# Patient Record
Sex: FEMALE | Race: WHITE | NOT HISPANIC OR LATINO | Employment: UNEMPLOYED | URBAN - METROPOLITAN AREA
[De-identification: names, ages, dates, MRNs, and addresses within clinical notes are randomized per-mention and may not be internally consistent; named-entity substitution may affect disease eponyms.]

---

## 2024-04-04 ENCOUNTER — TRANSFERRED RECORDS (OUTPATIENT)
Dept: HEALTH INFORMATION MANAGEMENT | Facility: CLINIC | Age: 49
End: 2024-04-04

## 2024-08-01 ENCOUNTER — PATIENT OUTREACH (OUTPATIENT)
Dept: ONCOLOGY | Facility: CLINIC | Age: 49
End: 2024-08-01
Payer: COMMERCIAL

## 2024-08-01 ENCOUNTER — TRANSCRIBE ORDERS (OUTPATIENT)
Dept: OTHER | Age: 49
End: 2024-08-01

## 2024-08-01 DIAGNOSIS — C78.00 RECTAL CANCER METASTASIZED TO LUNG (H): Primary | ICD-10-CM

## 2024-08-01 DIAGNOSIS — C20 RECTAL CANCER METASTASIZED TO LUNG (H): Primary | ICD-10-CM

## 2024-08-15 NOTE — PROGRESS NOTES
DTC referral received from Dr Hernandez,  Onc, for patient with metastatic rectal cancer.      (See my bookmarks for pertinent records in Epic)      Hx 2/2023 rectal cancer via colonoscopy at , s/p palliative radiation, 4 lines of systemic tx, 1 clinic trial. 4/2024 lung mets on biopsy. 7/2024 CT with progressive disease; Dr Hernandez/ Onc referring to DTC for evaluation/screening for clinica trials.    Will offer next available DTC per pt preference.    Anderson Das RN  Oncology Nurse Navigator  [facility]  [phone]

## 2024-09-04 NOTE — PROGRESS NOTES
Consult Note  [facility]  Video Visit      This visit is being conducted as a video visit due to patient preference and/or transportation or other logistic barriers.  Patient location: Home  Provider location: Clinic  Video start time: 08:46a  Video end time: 09:12a  Total video time: 26 minutes        Primary Oncologist:  Kiki Hernandez MD  [facility]  [address] [place] [address]      Patient: Roula Darling  : 1975  Language: English   Pronouns: she/her/hers     Date of Visit: Sep 6, 2024       Reason for visit: Rectal cancer. Discussion of phase I clinical trial options.       History of Present Illness:  Roula Darling is a 49 year old patient referred to the [facility] for discussion of phase I clinical trials for treatment of rectal cancer. History as follows:    Rectal Cancer History:  23: Colonoscopy biopsy pathology: Invasive adenocarcinoma.   23: Pelvic MRI: T3 N2 M0 CRM involved.    2/15/23-23: Palliative pelvic radiation with total dose 2500 cGy in 5 fractions.     23-23: First-line FOLFOX therapy.   -23: CAP CT: Partial response.   -23: Oxaliplatin reaction. Failed desensitization.   23-24: First-line therapy with FOLFIRI due to failed oxaliplatin desensitization.   -23: CAP CT: Stable disease.   -23: CAP CT: Stable disease.  -11/3/23: CAP CT: Stable disease.   -24: CAP CT: Progressive disease.     24-24: Second-line therapy with Lonsurf + bevacizumab.   -24: CAP CT: Progressive disease.     24-24: Third-line therapy with regorafenib.   -24: CAP CT: Progressive disease.     24-24: Fourth-line therapy on phase I clinical trial XTX-301 ([id number])--a tumor-activated IL-12 therapy.   -24: CAP CT: Progressive disease.     24-Present: Fifth-line therapy with FOLFOX (oxaliplatin per desensitization protocol  "sign significant dilution).       Genetic/Genomic/Molecular Testing History:  2/1/23: Tumor IHC:   -pMMR.       Subjective:  Roula Darling presents to the DTC today for a scheduled consultation, unaccompanied.   Roula reports that she continues to receive oxaliplatin per desensitization, and has continued to react. She has another cycle in 2 weeks, and then will get repeat imaging.  She reports feeling \"dumb\" on this drug.   But it has improved her cough.  She is a knitter, and takes walks in the park. Up and around >50% of the day.  She has constipation related to the anti-nausea drugs, resolved with senna.   Treatment-induced vomiting. She is able to maintain her appetite and weight.   She has cold sensitivity, but no peripheral neuropathy otherwise.       Medical History:  Past Medical History:   Diagnosis Date    Asthma     High school;  now resolved    Colorectal cancer (H) 2023         Surgical History:  Past Surgical History:   Procedure Laterality Date    IR CHEST PORT PLACEMENT > 5 YRS OF AGE  02/17/2023    URINARY SURGERY      as a child          Medications:   Current Outpatient Medications   Medication Sig Dispense Refill    omeprazole (PRILOSEC) 20 MG DR capsule Take 20 mg by mouth.      ondansetron (ZOFRAN) 8 MG tablet Take 8 mg by mouth.      prochlorperazine (COMPAZINE) 10 MG tablet Take 1 tablet by mouth every 6 hours as needed.       No current facility-administered medications for this visit.          Allergies:   Allergies   Allergen Reactions    Oxaliplatin Anaphylaxis    Red Dye #40 (Allura Red) Unknown          Social History:  Patient lives her  and teenage children.   Work status: Not employed. Activity: No activity limitations.   Advanced Directives: None. Desired Healthcare Power of :  Silas Darling.   Social History     Tobacco Use    Smoking status: Former     Types: Cigarettes    Smokeless tobacco: Never          Visual Exam:   Constitutional: healthy and alert. " No acute distress    HEENT: Grossly normal. Eyes clear, no erythema.    Cardiovascular: No pallor.    Respiratory: No cough, no labored breathing    Musculoskeletal: Muscles well-developed, no gross defects. No obvious edema.    Skin: No visible rashes nor lesions    Neurologic: Grossly normal.    Psych: Appropriate mood and affect       Laboratory Examination:  8/21/24 CBC: WBC 6.8, hemoglobin 10.0, platelets 190.   8/21/24 CMP: Creatinine 0.61. Electrolytes within normal limits. AST 81, ALT 89, alkaline phosphatase 109, total bilirubin 0.5, albumin 3.5.       Radiographic Examination:  7/30/24: Chest, abdomen, pelvic CT: RECIST-measurable disease in the left lung, liver, right adrenal gland.   LUNG: Redemonstration of numerous bilateral pulmonary nodules. Target lesion details are as follows:  1. Left lower lobe pulmonary nodule currently measuring 32.9 x 34.4 mm.  2. Left upper lobe pulmonary nodule currently measuring 18.0 mm x 16.4 mm .  Numerous other pulmonary nodules have also increased in size compared to prior examination. Calcified granuloma in the right lower lobe, reflecting sequelae of distant prior granulomatous disease. A 21.3 mm x 18.4 mm lesion within the left lower lobe. A new 7.0 x 9.4 mm focus of central cavitation.  LIVER:   1. Right hepatic lobe liver lesion measuring 23.5 x 25.0 mm.  2. Right hepatic lobe lesion measuring 22.8 x 17.2 mm.  SPLEEN:   Interval increase in splenic size compared to prior examination, currently measuring 10.6 x 5.5 cm in axial dimensions.   ADRENALS: 25.0 x 18.1 x 20.3 mm hypodense right adrenal lesion.       Performance Status:  ECOG Grade 1.      Assessment:  Roula Darling is a 49 year old patient with a diagnosis of progressive rectal cancer, currently receiving fifth-line therapy.         Plan:   1) Rectal cancer: I reviewed and confirmed the history above with the patient. I defer discussion of SOC options to the primary oncologist,and did review potential  phase I clinical trial options.     Potential Phase I clinical trial options:  -Phase I VSV-GP clinical trial ([id number]; [id number]): This is a phase I dose-escalation trial of the oncolytic virus VSV-GP BI [id number] as monotherapy (part 1) or in combination with checkpoint inhibitor Ezabenlimab (anti-PD-1; part 2). VSV-GP is thought to exert antitumor effect both directly through tumor lysis, and indirectly through activation of anti-tumor immunity. VSV-GP is administered intratumorally, intravenously, or both depending on the arm of the study. VSV-GP is administered every 3 weeks (with an additional day 4 cycle 1) for a total of 4 cycles; for part 2 ezabenlimab will be administered each cycle for up to 12 months. This trial includes all solid tumors, and requires at least 1 accessible lesion for biopsy, with some arms requiring 2 accessible lesions (one for injection, one for biopsy). There is no limit to number of prior lines of therapy.   -TSCAN-002 ([id number]; [id number]): This is a basket trial evaluating safety and preliminary efficacy autologous enhanced TCR-Ts as single or multiple dose regimens, or in combination (T-Plex) following lymphodepleting chemotherapy. Prescreening for HLA type and ANA PAULA status is required for eligibility and triage to a study cohort; additional pre-screening tests are required for some cohorts (e.g. TSCAN-003 requires MAGE-A1 and/or HPV-16 E7 expression). Study includes all solid tumors. No limit to prior number of therapies.   --Not eligible for phase I Fusion FPX-01-01 (history of pelvic radiation therapy)    Adverse events pre-dating study therapy:  -Grade 1 peripheral neuropathy (attributable to previous treatment): Described as cold sensitivity, no chronic symptoms.  -Grade 1 nausea, vomiting (attributable to current therapy): Resolved with antiemetics. Able to maintain nutrition, weight.   -Grade 1 constipation (attributable to antiemetics): Resolved with senna prn.      2) Labs/tests ordered: None.       A total of 26 minutes was spent with the patient, 20 minutes of which were spent in counseling/treatment planning; an additional 5 minutes was spent in chart review and documentation.       Fatimah Sutton MD, MS, FACOG, FACS  9/6/2024  9:14 AM

## 2024-09-06 ENCOUNTER — VIRTUAL VISIT (OUTPATIENT)
Dept: ONCOLOGY | Facility: CLINIC | Age: 49
End: 2024-09-06
Attending: OBSTETRICS & GYNECOLOGY
Payer: COMMERCIAL

## 2024-09-06 ENCOUNTER — PRE VISIT (OUTPATIENT)
Dept: ONCOLOGY | Facility: CLINIC | Age: 49
End: 2024-09-06
Payer: COMMERCIAL

## 2024-09-06 VITALS — WEIGHT: 142 LBS | BODY MASS INDEX: 24.24 KG/M2 | HEIGHT: 64 IN

## 2024-09-06 DIAGNOSIS — C78.00 RECTAL CANCER METASTASIZED TO LUNG (H): ICD-10-CM

## 2024-09-06 DIAGNOSIS — C20 RECTAL CANCER METASTASIZED TO LUNG (H): ICD-10-CM

## 2024-09-06 DIAGNOSIS — C20 RECTAL CANCER (H): Primary | ICD-10-CM

## 2024-09-06 PROCEDURE — 99204 OFFICE O/P NEW MOD 45 MIN: CPT | Mod: 95 | Performed by: OBSTETRICS & GYNECOLOGY

## 2024-09-06 RX ORDER — ONDANSETRON 8 MG/1
8 TABLET, FILM COATED ORAL
COMMUNITY
Start: 2024-08-05

## 2024-09-06 RX ORDER — PROCHLORPERAZINE MALEATE 10 MG
1 TABLET ORAL EVERY 6 HOURS PRN
COMMUNITY
Start: 2024-07-18

## 2024-09-06 ASSESSMENT — PAIN SCALES - GENERAL: PAINLEVEL: NO PAIN (0)

## 2024-09-06 NOTE — NURSING NOTE
Current patient location: [address] [place] [address] N  [facility] [address]    Is the patient currently in the state of [place]? YES    Visit mode:VIDEO    If the visit is dropped, the patient can be reconnected by: VIDEO VISIT: Text to cell phone:   Telephone Information:   Mobile [phone]       Will anyone else be joining the visit? NO  (If patient encounters technical issues they should call [phone]    How would you like to obtain your AVS? MyChart    Are changes needed to the allergy or medication list?  Drug allergies  and medications added from med reconcile.  Pt states she had a reaction to Morphine as a child but hasn't had since.    Are refills needed on medications prescribed by this physician? NO    Rooming Documentation:  Questionnaire(s) not done per department protocol      Reason for visit: Consult    Shanika Qureshi LPN

## 2024-09-06 NOTE — TELEPHONE ENCOUNTER
RECORDS STATUS - DEV THERAPEUTICS      APPOINTMENT DATE: 09/06/24  APPOINTMENT TIME: 8AM   DIAGNOSIS: Rectal cancer metastasized to lung (H) [C20, C78.00]     NOTES STATUS DETAILS   OFFICE NOTE from referring provider  (if different from Oncologist)      OFFICE NOTE from Primary Oncologist  (ALL notes pertaining to Diagnosis)    Please include   Clinic name and address  Clinic phone and fax   RN or Nurse coordinator's name and phone   Oncologist's email address for consulting provider  CE-HP Dr. Kiki Vick   MEDICATION LIST CE-HP    LABS     PATHOLOGY REPORTS ONLY  (Path slides are not needed unless asked by the clinical team)  04/04/24: [id number]  02/01/23: [id number]   ANYTHING RELATED TO DIAGNOSIS CE-HP Most recnt 09/04/24   GENONOMIC TESTING     Molecular, NGS studies (Recs are scanned in their system; common ones: Hina Mooney, [employer])     IMAGING (NEED IMAGES & REPORT)     CT SCANS Req 09/05 07/30/24-02/01/23: CT CAP   MRI Req 09/05 04/03/24: MR Abd  02/02/23: MR Pelvs

## 2024-09-06 NOTE — LETTER
2024      Roula Darling  [address]  [place] [address]      Dear Colleague,    Thank you for referring your patient, Roula Darling, to the [facility]. Please see a copy of my visit note below.    Consult Note  [facility]  Video Visit      This visit is being conducted as a video visit due to patient preference and/or transportation or other logistic barriers.  Patient location: Home  Provider location: Clinic  Video start time: 08:46a  Video end time: 09:12a  Total video time: 26 minutes        Primary Oncologist:  Kiki Hernandez MD  [facility]  [address] [place] [address]      Patient: Roula Darling  : 1975  Language: English   Pronouns: she/her/hers     Date of Visit: Sep 6, 2024       Reason for visit: Rectal cancer. Discussion of phase I clinical trial options.       History of Present Illness:  Roula Darling is a 49 year old patient referred to the [facility] for discussion of phase I clinical trials for treatment of rectal cancer. History as follows:    Rectal Cancer History:  23: Colonoscopy biopsy pathology: Invasive adenocarcinoma.   23: Pelvic MRI: T3 N2 M0 CRM involved.    2/15/23-23: Palliative pelvic radiation with total dose 2500 cGy in 5 fractions.     23-23: First-line FOLFOX therapy.   -23: CAP CT: Partial response.   -23: Oxaliplatin reaction. Failed desensitization.   23-24: First-line therapy with FOLFIRI due to failed oxaliplatin desensitization.   -23: CAP CT: Stable disease.   -23: CAP CT: Stable disease.  -11/3/23: CAP CT: Stable disease.   -24: CAP CT: Progressive disease.     24-24: Second-line therapy with Lonsurf + bevacizumab.   -24: CAP CT: Progressive disease.     24-24: Third-line therapy with regorafenib.   -24: CAP CT: Progressive disease.  "    6/4/24-7/30/24: Fourth-line therapy on phase I clinical trial XTX-301 ([id number])--a tumor-activated IL-12 therapy.   -7/30/24: CAP CT: Progressive disease.     8/5/24-Present: Fifth-line therapy with FOLFOX (oxaliplatin per desensitization protocol sign significant dilution).       Genetic/Genomic/Molecular Testing History:  2/1/23: Tumor IHC:   -pMMR.       Subjective:  Roula Darling presents to the DTC today for a scheduled consultation, unaccompanied.   Roula reports that she continues to receive oxaliplatin per desensitization, and has continued to react. She has another cycle in 2 weeks, and then will get repeat imaging.  She reports feeling \"dumb\" on this drug.   But it has improved her cough.  She is a knitter, and takes walks in the park. Up and around >50% of the day.  She has constipation related to the anti-nausea drugs, resolved with senna.   Treatment-induced vomiting. She is able to maintain her appetite and weight.   She has cold sensitivity, but no peripheral neuropathy otherwise.       Medical History:  Past Medical History:   Diagnosis Date     Asthma     High school;  now resolved     Colorectal cancer (H) 2023         Surgical History:  Past Surgical History:   Procedure Laterality Date     IR CHEST PORT PLACEMENT > 5 YRS OF AGE  02/17/2023     URINARY SURGERY      as a child          Medications:   Current Outpatient Medications   Medication Sig Dispense Refill     omeprazole (PRILOSEC) 20 MG DR capsule Take 20 mg by mouth.       ondansetron (ZOFRAN) 8 MG tablet Take 8 mg by mouth.       prochlorperazine (COMPAZINE) 10 MG tablet Take 1 tablet by mouth every 6 hours as needed.       No current facility-administered medications for this visit.          Allergies:   Allergies   Allergen Reactions     Oxaliplatin Anaphylaxis     Red Dye #40 (Allura Red) Unknown          Social History:  Patient lives her  and teenage children.   Work status: Not employed. Activity: No activity " limitations.   Advanced Directives: None. Desired Healthcare Power of :  Silas Darling.   Social History     Tobacco Use     Smoking status: Former     Types: Cigarettes     Smokeless tobacco: Never          Visual Exam:   Constitutional: healthy and alert. No acute distress    HEENT: Grossly normal. Eyes clear, no erythema.    Cardiovascular: No pallor.    Respiratory: No cough, no labored breathing    Musculoskeletal: Muscles well-developed, no gross defects. No obvious edema.    Skin: No visible rashes nor lesions    Neurologic: Grossly normal.    Psych: Appropriate mood and affect       Laboratory Examination:  8/21/24 CBC: WBC 6.8, hemoglobin 10.0, platelets 190.   8/21/24 CMP: Creatinine 0.61. Electrolytes within normal limits. AST 81, ALT 89, alkaline phosphatase 109, total bilirubin 0.5, albumin 3.5.       Radiographic Examination:  7/30/24: Chest, abdomen, pelvic CT: RECIST-measurable disease in the left lung, liver, right adrenal gland.   LUNG: Redemonstration of numerous bilateral pulmonary nodules. Target lesion details are as follows:  1. Left lower lobe pulmonary nodule currently measuring 32.9 x 34.4 mm.  2. Left upper lobe pulmonary nodule currently measuring 18.0 mm x 16.4 mm .  Numerous other pulmonary nodules have also increased in size compared to prior examination. Calcified granuloma in the right lower lobe, reflecting sequelae of distant prior granulomatous disease. A 21.3 mm x 18.4 mm lesion within the left lower lobe. A new 7.0 x 9.4 mm focus of central cavitation.  LIVER:   1. Right hepatic lobe liver lesion measuring 23.5 x 25.0 mm.  2. Right hepatic lobe lesion measuring 22.8 x 17.2 mm.  SPLEEN:   Interval increase in splenic size compared to prior examination, currently measuring 10.6 x 5.5 cm in axial dimensions.   ADRENALS: 25.0 x 18.1 x 20.3 mm hypodense right adrenal lesion.       Performance Status:  ECOG Grade 1.      Assessment:  Roula Darling is a 49 year old  patient with a diagnosis of progressive rectal cancer, currently receiving fifth-line therapy.         Plan:   1) Rectal cancer: I reviewed and confirmed the history above with the patient. I defer discussion of SOC options to the primary oncologist,and did review potential phase I clinical trial options.     Potential Phase I clinical trial options:  -Phase I VSV-GP clinical trial (2022IS180; [id number]): This is a phase I dose-escalation trial of the oncolytic virus VSV-GP BI [id number] as monotherapy (part 1) or in combination with checkpoint inhibitor Ezabenlimab (anti-PD-1; part 2). VSV-GP is thought to exert antitumor effect both directly through tumor lysis, and indirectly through activation of anti-tumor immunity. VSV-GP is administered intratumorally, intravenously, or both depending on the arm of the study. VSV-GP is administered every 3 weeks (with an additional day 4 cycle 1) for a total of 4 cycles; for part 2 ezabenlimab will be administered each cycle for up to 12 months. This trial includes all solid tumors, and requires at least 1 accessible lesion for biopsy, with some arms requiring 2 accessible lesions (one for injection, one for biopsy). There is no limit to number of prior lines of therapy.   -TSCAN-002 ([id number]; [id number]): This is a basket trial evaluating safety and preliminary efficacy autologous enhanced TCR-Ts as single or multiple dose regimens, or in combination (T-Plex) following lymphodepleting chemotherapy. Prescreening for HLA type and ANA PAULA status is required for eligibility and triage to a study cohort; additional pre-screening tests are required for some cohorts (e.g. TSCAN-003 requires MAGE-A1 and/or HPV-16 E7 expression). Study includes all solid tumors. No limit to prior number of therapies.   --Not eligible for phase I Fusion FPX-01-01 (history of pelvic radiation therapy)    Adverse events pre-dating study therapy:  -Grade 1 peripheral neuropathy (attributable to previous  treatment): Described as cold sensitivity, no chronic symptoms.  -Grade 1 nausea, vomiting (attributable to current therapy): Resolved with antiemetics. Able to maintain nutrition, weight.   -Grade 1 constipation (attributable to antiemetics): Resolved with senna prn.     2) Labs/tests ordered: None.       A total of 26 minutes was spent with the patient, 20 minutes of which were spent in counseling/treatment planning; an additional 5 minutes was spent in chart review and documentation.       Fatimah Sutton MD, MS, FACOG, FACS  9/6/2024  9:14 AM          Again, thank you for allowing me to participate in the care of your patient.        Sincerely,        Fatimah Sutton MD

## 2024-09-06 NOTE — Clinical Note
Brendan Celeste is an excellent clinical trial candidate whose rectal cancer has unfortunately progressed quickly through the last 3 treatments. She would be ready to enroll at any time. I identified VSV and TSCAN as potential options (others?). Please email her contact information and these 2 protocols. Could you also set-up a pre-screening appointment for TSCAN? Thanks.  --marla Sutton MD, MS, FACOG, FACS 9/6/2024 9:15 AM

## 2024-09-08 PROBLEM — C20 RECTAL CANCER (H): Status: ACTIVE | Noted: 2024-09-08

## 2024-10-06 ENCOUNTER — HEALTH MAINTENANCE LETTER (OUTPATIENT)
Age: 49
End: 2024-10-06

## 2025-02-17 NOTE — PROGRESS NOTES
[facility]    This visit is being conducted as a video visit due to patient preference and/or transportation or other logistic barriers.    Primary Oncologist:  Kiki Hernandez MD  [facility] [address] [place] [address]      Patient: Roula Darling  : 1975  Language: English   Pronouns: she/her/hers     Date of Visit: 2025       Reason for visit: Rectal cancer. Discussion of phase I clinical trial options.       History of Present Illness:  Roula Darling is a 49 year old female with KRAS G12V mutated rectal adenocarcinoma metastasized to the lungs, adrenal glands, and lymph nodes.    Rectal Cancer History:  23: Colonoscopy biopsy pathology: Invasive adenocarcinoma.   23: Pelvic MRI: T3 N2 M0 CRM involved.    2/15/23-23: Palliative pelvic radiation with total dose 2500 cGy in 5 fractions.     23-23: First-line FOLFOX therapy.   -23: CAP CT: Partial response.   -23: Oxaliplatin reaction. Failed desensitization.   23-24: First-line therapy with FOLFIRI due to failed oxaliplatin desensitization.   -23: CAP CT: Stable disease.   -23: CAP CT: Stable disease.  -11/3/23: CAP CT: Stable disease.   -24: CAP CT: Progressive disease.     24-24: Second-line therapy with Lonsurf + bevacizumab.   -24: CAP CT: Progressive disease.     24-24: Third-line therapy with regorafenib.   -24: CAP CT: Progressive disease.     24-24: Fourth-line therapy on phase I clinical trial XTX-301 ([id number])--a tumor-activated IL-12 therapy.   -24: CAP CT: Progressive disease.     24-Present: Fifth-line therapy with FOLFOX (oxaliplatin per desensitization protocol sign significant dilution).   - 2025 CAP CT:  Progressive disease      Genetic/Genomic/Molecular Testing History:  23: Tumor IHC:   -pMMR.     Subjective:  Presents today after determined to have  disease progression on 5th line therapy with FOLFOX.  At last DTC visit with Fatimah Sutton MD on 9/6/2024, VSV-GP and TSCAN trials were discussed.  Ms. Eid states that her last FOLFOX treatment was 2 weeks ago.  She notes that she was receiving FOLFOX at 50% dose as greater doses caused hypersensitivity reaction.  She notes that with her disease progression she has redeveloped a dry cough.  She is treating this with Tessalon Perles.  She has had no fevers or chills.  She has had no episodes of chest pain.  In fact, she denies pain of any kind.  She specifically denies abdominal pain.  She notes having constipation related to the antiemetics that she receives for the FOLFOX treatment.  Her health is otherwise unchanged from her last visit.    Medical History:  Past Medical History:   Diagnosis Date    Asthma     High school;  now resolved    Colorectal cancer (H) 2023         Surgical History:  Past Surgical History:   Procedure Laterality Date    IR CHEST PORT PLACEMENT > 5 YRS OF AGE  02/17/2023    URINARY SURGERY      as a child     Medications:   Current Outpatient Medications   Medication Sig Dispense Refill    omeprazole (PRILOSEC) 20 MG DR capsule Take 20 mg by mouth.      ondansetron (ZOFRAN) 8 MG tablet Take 8 mg by mouth.      prochlorperazine (COMPAZINE) 10 MG tablet Take 1 tablet by mouth every 6 hours as needed.       No current facility-administered medications for this visit.      Allergies:   Allergies   Allergen Reactions    Oxaliplatin Anaphylaxis    Red Dye #40 (Allura Red) Unknown      Social History:  Patient lives her  and teenage children.   Work status: Not employed. Activity: No activity limitations.   Advanced Directives: None. Desired Healthcare Power of :  Silas Darling.     Physical Exam:   /80   Pulse 115   Resp 20   Wt 62.9 kg (138 lb 11.2 oz)   SpO2 100%   BMI 23.81 kg/m    Constitutional: healthy and alert. No acute distress  HEENT: Grossly normal.  Eyes clear, no erythema.  Cardiovascular: No pallor.  Respiratory: No cough, no labored breathing  Musculoskeletal: Muscles well-developed, no gross defects. No obvious edema.  Skin: No visible rashes nor lesions  Neurologic: Grossly normal.  Psych: Appropriate mood and affect       Laboratory Examination:  I personally reviewed the below labs while in clinic today:     2/17/2025 Labs:  Electrolytes are wnl with the exception of low carbon dioxide of 19 mmol/L  Creatinine is low at 0.53 mg/dL  Alkaline phosphatase is elevated at 192 U/L  Transaminases are wnl.    WBC is wnl at 7.2  Hemoglobin is low at 10.4 g/dL  Platelets are wnl.      Radiographic Examination:  I personally reviewed the below images while in clinic today:    2/9/2025 CT C/A/P w/ contrast:  FINDINGS:     CHEST:   CHEST WALL AND LOWER NECK: Port catheter tip in satisfactory position. Chest wall otherwise unremarkable     HEART AND VASCULATURE: Normal heart size. No pericardial effusion. No thoracic aortic aneurysm.     MEDIASTINUM: Unremarkable esophagus. No adenopathy.     LUNGS AND PLEURAL SPACE: Multiple lung masses are redemonstrated. They have increased in size compared to prior. Representative examples include a mass in the left upper lobe on image #32 now measures 2.2 x 2.4 cm. This compares with 1.7 x 1.8 cm previously on the study of 12/06/2024. The left hilar mass has a maximum diameter of 3.4 cm. This compares with 2.3 cm on the prior exam. A right lung mass on image #38 has a maximum diameter of 1.3 cm. This compares with 1.0 cm previously     ABDOMEN/PELVIS:   LIVER: Ill-defined mass in the inferior right hepatic lobe now measures 1.9 cm. Previously measured 1.6 cm. Larger mass in the mid right liver now measures approximately 3.7 x 3.4 cm. A recent measured 2.3 x 2.6 cm.  New small 0.9 cm right liver mass on image number site 53     GALLBLADDER AND BILIARY TREE: Unremarkable. No intrahepatic or extrahepatic biliary ductal dilation.      PANCREAS: Unremarkable.     SPLEEN: Unremarkable.     ADRENALS: Multinodular right adrenal gland appears stable. Left adrenal gland unremarkable     KIDNEYS, URETERS, AND BLADDER: Unremarkable. No hydronephrosis.     VESSELS: No abdominal aortic aneurysm.     BOWEL: No evidence of bowel obstruction. Soft tissue thickening about the rectum appears similar.     REPRODUCTIVE ORGANS: No pelvic mass.     MESENTERY/PERITONEUM: No enlarged mesenteric lymph nodes. No ascites or free air. No focal fluid collection.     RETROPERITONEUM: No adenopathy.     ABDOMINAL WALL/SOFT TISSUES: Unremarkable.     BONES: Stable compared to previous.       Performance Status:  ECOG Grade 1.      Assessment:  Roula Darling is a 49 year old patient with a diagnosis of metastatic rectal cancer who recently progressed on 5th line therapy with FOLFOX.     Plan:   Rectal cancer: I reviewed and confirmed the history above with the patient. I defer discussion of SOC options to the primary oncologist,and did review potential phase I clinical trial options.      She had previous discussions regarding the VSV-GP and TSCAN trials.  Today, we discussed screening for the phase I TORL-1-23 trial.  TORL-1-23 directs chemotherapy (MMAE, a potent microtubule inhibitor) to cancer cells expressing claudin-6. The trial has cohorts for tumors with high expression of Claudin-6 as well as a cohort for low or negative expression.  For this trial we require prescreening to send archival tissue to see if the cancer has high expression of Claudin-6.  The treatment is administered intravenously once every 3 weeks.  Potential side effects of TORL-1-23 include risk of hypersensitivity reaction, low blood counts, peripheral neuropathy, elevation of liver tests, fatigue, edema, and nausea amongst others. Patients are imaged once every 6 weeks to evaluate response to treatment.  Treatment is continued until disease progression, unacceptable toxicity, or the patient  chooses to stop treatment for whatever reason.  She is interested in screening for this trial and following our visit consented to have tissue sent for claudin-6 testing.  This testing typically takes 4-6 weeks to results.  In the meantime, we encouraged her to continue treatment with Dr. Hernandez.      I spent 31 minutes on the date of the encounter doing chart review, review of test results, interpretation of tests, patient visit, and documentation

## 2025-02-19 ENCOUNTER — OFFICE VISIT (OUTPATIENT)
Dept: ONCOLOGY | Facility: CLINIC | Age: 50
End: 2025-02-19
Attending: INTERNAL MEDICINE
Payer: COMMERCIAL

## 2025-02-19 ENCOUNTER — ALLIED HEALTH/NURSE VISIT (OUTPATIENT)
Dept: ONCOLOGY | Facility: CLINIC | Age: 50
End: 2025-02-19
Payer: COMMERCIAL

## 2025-02-19 VITALS
DIASTOLIC BLOOD PRESSURE: 80 MMHG | RESPIRATION RATE: 20 BRPM | WEIGHT: 138.7 LBS | BODY MASS INDEX: 23.81 KG/M2 | OXYGEN SATURATION: 100 % | HEART RATE: 115 BPM | SYSTOLIC BLOOD PRESSURE: 114 MMHG

## 2025-02-19 DIAGNOSIS — C20 RECTAL CANCER METASTASIZED TO LUNG (H): Primary | ICD-10-CM

## 2025-02-19 DIAGNOSIS — C78.00 RECTAL CANCER METASTASIZED TO LUNG (H): Primary | ICD-10-CM

## 2025-02-19 DIAGNOSIS — Z00.6 EVALUATED FOR CLINICAL TRIAL ENROLLMENT: ICD-10-CM

## 2025-02-19 PROCEDURE — 99213 OFFICE O/P EST LOW 20 MIN: CPT | Performed by: INTERNAL MEDICINE

## 2025-02-19 RX ORDER — LIDOCAINE AND PRILOCAINE 25; 25 MG/G; MG/G
CREAM TOPICAL
COMMUNITY
Start: 2024-03-15

## 2025-02-19 RX ORDER — SENNOSIDES A AND B 8.6 MG/1
2 TABLET, FILM COATED ORAL
COMMUNITY
Start: 2024-06-07

## 2025-02-19 RX ORDER — BENZONATATE 100 MG/1
100 CAPSULE ORAL
COMMUNITY
Start: 2025-02-17

## 2025-02-19 RX ORDER — POLYETHYLENE GLYCOL 3350 17 G/17G
17 POWDER, FOR SOLUTION ORAL DAILY PRN
COMMUNITY
Start: 2024-06-28

## 2025-02-19 ASSESSMENT — PAIN SCALES - GENERAL: PAINLEVEL_OUTOF10: NO PAIN (0)

## 2025-02-19 NOTE — LETTER
2025      Roula Darling  [address]  [place] [address]      Dear Colleague,    Thank you for referring your patient, Roula Darling, to the [facility]. Please see a copy of my visit note below.      [facility]    This visit is being conducted as a video visit due to patient preference and/or transportation or other logistic barriers.    Primary Oncologist:  Kiki Hernandez MD  [facility] [address] [place]  [place] [address]      Patient: Roula Darling  : 1975  Language: English   Pronouns: she/her/hers     Date of Visit: 2025       Reason for visit: Rectal cancer. Discussion of phase I clinical trial options.       History of Present Illness:  Roula Darling is a 49 year old female with KRAS G12V mutated rectal adenocarcinoma metastasized to the lungs, adrenal glands, and lymph nodes.    Rectal Cancer History:  23: Colonoscopy biopsy pathology: Invasive adenocarcinoma.   23: Pelvic MRI: T3 N2 M0 CRM involved.    2/15/23-23: Palliative pelvic radiation with total dose 2500 cGy in 5 fractions.     23-23: First-line FOLFOX therapy.   -23: CAP CT: Partial response.   -23: Oxaliplatin reaction. Failed desensitization.   23-24: First-line therapy with FOLFIRI due to failed oxaliplatin desensitization.   -23: CAP CT: Stable disease.   -23: CAP CT: Stable disease.  -11/3/23: CAP CT: Stable disease.   -24: CAP CT: Progressive disease.     24-24: Second-line therapy with Lonsurf + bevacizumab.   -24: CAP CT: Progressive disease.     24-24: Third-line therapy with regorafenib.   -24: CAP CT: Progressive disease.     24-24: Fourth-line therapy on phase I clinical trial XTX-301 ([id number])--a tumor-activated IL-12 therapy.   -24: CAP CT: Progressive disease.     24-Present: Fifth-line therapy with FOLFOX  (oxaliplatin per desensitization protocol sign significant dilution).   - 2/9/2025 CAP CT:  Progressive disease      Genetic/Genomic/Molecular Testing History:  2/1/23: Tumor IHC:   -pMMR.     Subjective:  Presents today after determined to have disease progression on 5th line therapy with FOLFOX.  At last DTC visit with Fatimah Sutton MD on 9/6/2024, VSV-GP and TSCAN trials were discussed.  Ms. Eid states that her last FOLFOX treatment was 2 weeks ago.  She notes that she was receiving FOLFOX at 50% dose as greater doses caused hypersensitivity reaction.  She notes that with her disease progression she has redeveloped a dry cough.  She is treating this with Tessalon Perles.  She has had no fevers or chills.  She has had no episodes of chest pain.  In fact, she denies pain of any kind.  She specifically denies abdominal pain.  She notes having constipation related to the antiemetics that she receives for the FOLFOX treatment.  Her health is otherwise unchanged from her last visit.    Medical History:  Past Medical History:   Diagnosis Date     Asthma     High school;  now resolved     Colorectal cancer (H) 2023         Surgical History:  Past Surgical History:   Procedure Laterality Date     IR CHEST PORT PLACEMENT > 5 YRS OF AGE  02/17/2023     URINARY SURGERY      as a child     Medications:   Current Outpatient Medications   Medication Sig Dispense Refill     omeprazole (PRILOSEC) 20 MG DR capsule Take 20 mg by mouth.       ondansetron (ZOFRAN) 8 MG tablet Take 8 mg by mouth.       prochlorperazine (COMPAZINE) 10 MG tablet Take 1 tablet by mouth every 6 hours as needed.       No current facility-administered medications for this visit.      Allergies:   Allergies   Allergen Reactions     Oxaliplatin Anaphylaxis     Red Dye #40 (Allura Red) Unknown      Social History:  Patient lives her  and teenage children.   Work status: Not employed. Activity: No activity limitations.   Advanced Directives: None.  Desired Healthcare Power of :  Silas Darling.     Physical Exam:   /80   Pulse 115   Resp 20   Wt 62.9 kg (138 lb 11.2 oz)   SpO2 100%   BMI 23.81 kg/m    Constitutional: healthy and alert. No acute distress  HEENT: Grossly normal. Eyes clear, no erythema.  Cardiovascular: No pallor.  Respiratory: No cough, no labored breathing  Musculoskeletal: Muscles well-developed, no gross defects. No obvious edema.  Skin: No visible rashes nor lesions  Neurologic: Grossly normal.  Psych: Appropriate mood and affect       Laboratory Examination:  I personally reviewed the below labs while in clinic today:     2/17/2025 Labs:  Electrolytes are wnl with the exception of low carbon dioxide of 19 mmol/L  Creatinine is low at 0.53 mg/dL  Alkaline phosphatase is elevated at 192 U/L  Transaminases are wnl.    WBC is wnl at 7.2  Hemoglobin is low at 10.4 g/dL  Platelets are wnl.      Radiographic Examination:  I personally reviewed the below images while in clinic today:    2/9/2025 CT C/A/P w/ contrast:  FINDINGS:     CHEST:   CHEST WALL AND LOWER NECK: Port catheter tip in satisfactory position. Chest wall otherwise unremarkable     HEART AND VASCULATURE: Normal heart size. No pericardial effusion. No thoracic aortic aneurysm.     MEDIASTINUM: Unremarkable esophagus. No adenopathy.     LUNGS AND PLEURAL SPACE: Multiple lung masses are redemonstrated. They have increased in size compared to prior. Representative examples include a mass in the left upper lobe on image #32 now measures 2.2 x 2.4 cm. This compares with 1.7 x 1.8 cm previously on the study of 12/06/2024. The left hilar mass has a maximum diameter of 3.4 cm. This compares with 2.3 cm on the prior exam. A right lung mass on image #38 has a maximum diameter of 1.3 cm. This compares with 1.0 cm previously     ABDOMEN/PELVIS:   LIVER: Ill-defined mass in the inferior right hepatic lobe now measures 1.9 cm. Previously measured 1.6 cm. Larger mass in  the mid right liver now measures approximately 3.7 x 3.4 cm. A recent measured 2.3 x 2.6 cm.  New small 0.9 cm right liver mass on image number site 53     GALLBLADDER AND BILIARY TREE: Unremarkable. No intrahepatic or extrahepatic biliary ductal dilation.     PANCREAS: Unremarkable.     SPLEEN: Unremarkable.     ADRENALS: Multinodular right adrenal gland appears stable. Left adrenal gland unremarkable     KIDNEYS, URETERS, AND BLADDER: Unremarkable. No hydronephrosis.     VESSELS: No abdominal aortic aneurysm.     BOWEL: No evidence of bowel obstruction. Soft tissue thickening about the rectum appears similar.     REPRODUCTIVE ORGANS: No pelvic mass.     MESENTERY/PERITONEUM: No enlarged mesenteric lymph nodes. No ascites or free air. No focal fluid collection.     RETROPERITONEUM: No adenopathy.     ABDOMINAL WALL/SOFT TISSUES: Unremarkable.     BONES: Stable compared to previous.       Performance Status:  ECOG Grade 1.      Assessment:  Roula Darling is a 49 year old patient with a diagnosis of metastatic rectal cancer who recently progressed on 5th line therapy with FOLFOX.     Plan:   Rectal cancer: I reviewed and confirmed the history above with the patient. I defer discussion of SOC options to the primary oncologist,and did review potential phase I clinical trial options.      She had previous discussions regarding the VSV-GP and TSCAN trials.  Today, we discussed screening for the phase I TORL-1-23 trial.  TORL-1-23 directs chemotherapy (MMAE, a potent microtubule inhibitor) to cancer cells expressing claudin-6. The trial has cohorts for tumors with high expression of Claudin-6 as well as a cohort for low or negative expression.  For this trial we require prescreening to send archival tissue to see if the cancer has high expression of Claudin-6.  The treatment is administered intravenously once every 3 weeks.  Potential side effects of TORL-1-23 include risk of hypersensitivity reaction, low blood counts,  peripheral neuropathy, elevation of liver tests, fatigue, edema, and nausea amongst others. Patients are imaged once every 6 weeks to evaluate response to treatment.  Treatment is continued until disease progression, unacceptable toxicity, or the patient chooses to stop treatment for whatever reason.  She is interested in screening for this trial and following our visit consented to have tissue sent for claudin-6 testing.  This testing typically takes 4-6 weeks to results.  In the meantime, we encouraged her to continue treatment with Dr. Hernandez.      I spent 31 minutes on the date of the encounter doing chart review, review of test results, interpretation of tests, patient visit, and documentation           Again, thank you for allowing me to participate in the care of your patient.        Sincerely,        Lupis Roland MD    Electronically signed

## 2025-02-19 NOTE — PROGRESS NOTES
[id number] Pre screening : Informed Consent Note     The consent form, including purpose, risks and benefits, was reviewed with Roula Darling, and all questions were answered before she signed the consent form. The patient understands that the study involves an active treatment phase as well as a post-treatment follow up phase.     Present during the discussion was Dr. Roland, patient, and myself A copy of the signed form was provided to the patient. No procedures specific to this study were performed prior to the patient signing the consent form.    Consent Version Date: 11/21/2023  Consent obtained by: Katharina Duran RN    Date: 02/19/2025  HIPAA authorization signed?: yes  HIPAA authorization version date: 04/08/2022  RACE AND ETHNICITY:  I discussed with Roula Darling that the National [employer] [place] (NCI) has asked that information on race and ethnicity be obtained from NCI-sponsored studies' participants whenever possible and that the purpose for this request is to assist the NCI in evaluating whether persons of all races and ethnicity are given the opportunity to participate in new cancer treatment options. It was explained that the information will be sent to the NCI in a way in which she cannot be identified. It was also explained that providing this information is voluntary.  Roula Darling provided the following responses:  Ethnicity: non-  Race: White  Katharina Duran RN

## 2025-03-10 ENCOUNTER — ALLIED HEALTH/NURSE VISIT (OUTPATIENT)
Dept: ONCOLOGY | Facility: CLINIC | Age: 50
End: 2025-03-10
Payer: COMMERCIAL

## 2025-03-10 DIAGNOSIS — C78.00 RECTAL CANCER METASTASIZED TO LUNG (H): Primary | ICD-10-CM

## 2025-03-10 DIAGNOSIS — C20 RECTAL CANCER METASTASIZED TO LUNG (H): Primary | ICD-10-CM

## 2025-03-11 NOTE — PROGRESS NOTES
[id number] TORL-1-23     Writer received study results for Claudin-6 testing for this patient. Her tumor tissue is  negative  for CLDN6 expression. Writer sent this information to the patient via [employer] and also notified her DTC physician, Dr. Deleon.

## 2025-06-03 ENCOUNTER — ALLIED HEALTH/NURSE VISIT (OUTPATIENT)
Dept: ONCOLOGY | Facility: CLINIC | Age: 50
End: 2025-06-03
Payer: COMMERCIAL

## 2025-06-03 DIAGNOSIS — Z00.6 CLINICAL TRIAL PARTICIPANT: Primary | ICD-10-CM

## 2025-06-03 NOTE — NURSING NOTE
Patient identified as TORL candidate -- Reached out to Dr. Kennedy, per MD this is a good time to start trial and patient is interested and willing  Currently getting Fruquitinib which will require 28 day washout

## 2025-06-09 NOTE — PROGRESS NOTES
DEVELOPMENTAL THERAPEUTICS CLINIC      PATIENT NAME: Roula Darling MRN # [id number]  DATE OF VISIT: June 9, 2025 YOB: 1975    Study: TORL1,23  Date of Consent: 6/10/25  C1D1: Tentatively 6/30/25    Cancer Type: Rectal Cancer  Primary Oncologist: Kiki Hernandez    Reason for Visit: follow-up      Interval History  Roula is here in follow-up.  She is here for a consenting visit for TORL 1,23 (Anti-Claudin6 adc).  She is feeling ok.  She notes significant fatigue all the time.  She is really not able to be very active as a result.  She is still doing activities - her daughter bought her a puppy (Border Collie) that is very high energy so she is the one taking the dog for walks daily - in and out of the house.  She is doing some light chores, but admits not very much.  She mostly is knitting and reading. She takes naps but only 10minutes at a time.  She is not having shortness of breath really, just gets tired easily.  She has some nausea with emesis at times.  She says smells really bother her so she is trying to keep the windows open more.  She otherwise is not really having any abdominal pain, no diarrhea or constipation.  She does not have any neuropathy from prior treatment, just some cold sensitivity from the oxaliplatin but that has resolved now.    Otherwise she is not really complaining of any pain today.         Cancer History  (2/15/2023 - 2/21/2023) Palliative radiation, 2500 cGy to pelvis in 5 fractions.  (2/22/2023 - 05/01/2023.) FOLFOX chemotherapy every 2 weeks.  05/01/2023: Oxaliplatin reaction needing EpiPen. Oxaliplatin desensitization failed 05/08/2023. Hence patient received 5 FU only that treatment  05/22/2023- 1/16/24: FOLFIRI with growth factor support every 2 weeks. Progressive disease in the lungs  2/6/24-4/2/24: Lonsurf with bevacizumab  04/20/2024-5/20/24: Regorafenib  6/4/24- 07/30/2024. : clinical trial XTX-301  08/05/2024-02/05/2025: Oxaliplatin desensitization plan  for FOLFOX   02/24/2025- 03/10/2025:5 FU alone  04/01/2025:Fruquintinib    Molecular/NGS  FoundationOne completed 03/13/2023. K-jerilyn G12V mutated.    Other mutations:  KRAS G12 V  NRAS wild type  [id number]  FBXW7  PTEN  SMAD4  Tp53     Microsatellite stable  TMB: 4 mut/MB     BRAF negative     Others: Her2 IHC1+    Pre-screening for TORL1, 23 - Claudin 6 negative (Reported on 3/19/25)    Past Medical History  Past Medical History:   Diagnosis Date    Asthma     High school;  now resolved    Colorectal cancer (H) 2023       Current Medications  Current Outpatient Medications   Medication Sig Dispense Refill    benzonatate (TESSALON) 100 MG capsule Take 100 mg by mouth.      lidocaine-prilocaine (EMLA) 2.5-2.5 % external cream APPLY TO PORT-A-CATH SITE ONE HOUR PRIOR TO NEEDLE ACCESS.      omeprazole (PRILOSEC) 20 MG DR capsule Take 20 mg by mouth.      ondansetron (ZOFRAN) 8 MG tablet Take 8 mg by mouth.      polyethylene glycol (MIRALAX) 17 g packet Take 17 g by mouth daily as needed.      prochlorperazine (COMPAZINE) 10 MG tablet Take 1 tablet by mouth every 6 hours as needed.      senna (SENOKOT) 8.6 MG tablet Take 2 tablets by mouth.          Review of Systems: As above in the HPI, o/w complete 12-point ROS was negative.    Physical Exam  /75 (BP Location: Right arm, Patient Position: Sitting, Cuff Size: Adult Regular)   Pulse 113   Temp 97.6  F (36.4  C) (Oral)   Resp 12   Wt 56.1 kg (123 lb 11.2 oz)   SpO2 99%   BMI 21.23 kg/m       GEN: NAD  HEENT: EOMI, no icterus, injection or pallor. Oropharynx clear  NECK: no cervical or supraclavicular lymphadenopathy  LUNGS: clear bilaterally  CV: regular, no murmurs, rubs, or gallops  ABDOMEN: soft, non-tender, non-distended, normal bowel sounds  EXT: warm, well perfused, no edema  NEURO: alert  SKIN: no rashes    Laboratory and Imaging Studies    Mostly unremarkable.  Alk phos slightly elevated.   LDH was quite elevated at 600.   Otherwise CBC is ok. Hgb  11.5    Labs were independently reviewed and interpreted by me    No image results found.       Imaging was personally reviewed and interpreted by me      Assessment/Plan  Ms. Roula Darling is a 50 year old female with metastatic rectal cancer who returns to clinic for consideration of TORL1,23 on Claudin 6 negative arm. .    We discussed the rationale of the trial.   We discussed that while her Claudin 6 was negative there is precedent for ADCs working in the absence of the target.  We discussed some of the side effects of TORL1,23 - myelosuppression, diarrhea, neuropathy, eye toxicity and lung toxicity.  We also discussed the study requirements with regards to PK blood draws and schedule of being here.   We discussed alternatives - she is currently not on any treatment as she either did not tolerate treatment or really does not have standard of care options remaining.     She expressed interest in proceedign with the study and she did go through the consent process with Katherin Florez [facility] (see separate consenting note).     ECOG PS: 1    Last scan was in March - will repeat scans for screening.   Screening blood work to be completed today.     Tentatively discussed c1d1 date of 6/30/25.  Needs to be confirmed.           40 minutes spent on the date of the encounter doing chart review, review of outside records, review of test results, interpretation of tests, patient visit, and documentation     Grant Lora  Associate Professor of Medicine  Division of Hematology, Oncology, and Transplantation

## 2025-06-10 ENCOUNTER — OFFICE VISIT (OUTPATIENT)
Dept: ONCOLOGY | Facility: CLINIC | Age: 50
End: 2025-06-10
Attending: INTERNAL MEDICINE
Payer: COMMERCIAL

## 2025-06-10 ENCOUNTER — LAB (OUTPATIENT)
Dept: LAB | Facility: CLINIC | Age: 50
End: 2025-06-10
Attending: INTERNAL MEDICINE
Payer: COMMERCIAL

## 2025-06-10 ENCOUNTER — ALLIED HEALTH/NURSE VISIT (OUTPATIENT)
Dept: ONCOLOGY | Facility: CLINIC | Age: 50
End: 2025-06-10
Payer: COMMERCIAL

## 2025-06-10 VITALS
BODY MASS INDEX: 21.23 KG/M2 | WEIGHT: 123.7 LBS | TEMPERATURE: 97.6 F | OXYGEN SATURATION: 99 % | HEART RATE: 113 BPM | SYSTOLIC BLOOD PRESSURE: 105 MMHG | RESPIRATION RATE: 12 BRPM | DIASTOLIC BLOOD PRESSURE: 75 MMHG

## 2025-06-10 DIAGNOSIS — Z00.6 EXAMINATION OF PARTICIPANT IN CLINICAL TRIAL: ICD-10-CM

## 2025-06-10 DIAGNOSIS — Z00.6 EXAMINATION OF PARTICIPANT IN CLINICAL TRIAL: Primary | ICD-10-CM

## 2025-06-10 LAB
ALBUMIN SERPL BCG-MCNC: 4.3 G/DL (ref 3.5–5.2)
ALBUMIN UR-MCNC: NEGATIVE MG/DL
ALP SERPL-CCNC: 168 U/L (ref 40–150)
ALT SERPL W P-5'-P-CCNC: 12 U/L (ref 0–50)
AMYLASE SERPL-CCNC: 30 U/L (ref 28–100)
ANION GAP SERPL CALCULATED.3IONS-SCNC: 14 MMOL/L (ref 7–15)
APPEARANCE UR: CLEAR
AST SERPL W P-5'-P-CCNC: 30 U/L (ref 0–45)
BASOPHILS # BLD AUTO: 0 10E3/UL (ref 0–0.2)
BASOPHILS NFR BLD AUTO: 0 %
BILIRUB SERPL-MCNC: 0.4 MG/DL
BILIRUB UR QL STRIP: NEGATIVE
BUN SERPL-MCNC: 7.6 MG/DL (ref 6–20)
CALCIUM SERPL-MCNC: 9.8 MG/DL (ref 8.8–10.4)
CEA SERPL-MCNC: 6.7 NG/ML
CHLORIDE SERPL-SCNC: 101 MMOL/L (ref 98–107)
COLOR UR AUTO: ABNORMAL
CREAT SERPL-MCNC: 0.56 MG/DL (ref 0.51–0.95)
EGFRCR SERPLBLD CKD-EPI 2021: >90 ML/MIN/1.73M2
EOSINOPHIL # BLD AUTO: 0.4 10E3/UL (ref 0–0.7)
EOSINOPHIL NFR BLD AUTO: 5 %
ERYTHROCYTE [DISTWIDTH] IN BLOOD BY AUTOMATED COUNT: 18.3 % (ref 10–15)
GLUCOSE SERPL-MCNC: 92 MG/DL (ref 70–99)
GLUCOSE UR STRIP-MCNC: NEGATIVE MG/DL
HCG SERPL QL: NEGATIVE
HCO3 SERPL-SCNC: 23 MMOL/L (ref 22–29)
HCT VFR BLD AUTO: 36.6 % (ref 35–47)
HGB BLD-MCNC: 11.5 G/DL (ref 11.7–15.7)
HGB UR QL STRIP: NEGATIVE
IMM GRANULOCYTES # BLD: 0 10E3/UL
IMM GRANULOCYTES NFR BLD: 0 %
INR PPP: 1.05 (ref 0.85–1.15)
KETONES UR STRIP-MCNC: 20 MG/DL
LDH SERPL L TO P-CCNC: 600 U/L (ref 0–250)
LEUKOCYTE ESTERASE UR QL STRIP: ABNORMAL
LIPASE SERPL-CCNC: 17 U/L (ref 13–60)
LYMPHOCYTES # BLD AUTO: 0.6 10E3/UL (ref 0.8–5.3)
LYMPHOCYTES NFR BLD AUTO: 7 %
MAGNESIUM SERPL-MCNC: 2.2 MG/DL (ref 1.7–2.3)
MCH RBC QN AUTO: 24.2 PG (ref 26.5–33)
MCHC RBC AUTO-ENTMCNC: 31.4 G/DL (ref 31.5–36.5)
MCV RBC AUTO: 77 FL (ref 78–100)
MONOCYTES # BLD AUTO: 0.5 10E3/UL (ref 0–1.3)
MONOCYTES NFR BLD AUTO: 7 %
MUCOUS THREADS #/AREA URNS LPF: PRESENT /LPF
NEUTROPHILS # BLD AUTO: 6.8 10E3/UL (ref 1.6–8.3)
NEUTROPHILS NFR BLD AUTO: 81 %
NITRATE UR QL: NEGATIVE
NRBC # BLD AUTO: 0 10E3/UL
NRBC BLD AUTO-RTO: 0 /100
PH UR STRIP: 7.5 [PH] (ref 5–7)
PHOSPHATE SERPL-MCNC: 3.6 MG/DL (ref 2.5–4.5)
PLATELET # BLD AUTO: 444 10E3/UL (ref 150–450)
POTASSIUM SERPL-SCNC: 4 MMOL/L (ref 3.4–5.3)
PROT SERPL-MCNC: 8.2 G/DL (ref 6.4–8.3)
PROTHROMBIN TIME: 13.9 SECONDS (ref 11.8–14.8)
RBC # BLD AUTO: 4.75 10E6/UL (ref 3.8–5.2)
RBC URINE: 1 /HPF
SODIUM SERPL-SCNC: 138 MMOL/L (ref 135–145)
SP GR UR STRIP: 1.02 (ref 1–1.03)
SQUAMOUS EPITHELIAL: 1 /HPF
TRANSITIONAL EPI: <1 /HPF
UROBILINOGEN UR STRIP-MCNC: NORMAL MG/DL
WBC # BLD AUTO: 8.4 10E3/UL (ref 4–11)
WBC URINE: 6 /HPF

## 2025-06-10 PROCEDURE — 84703 CHORIONIC GONADOTROPIN ASSAY: CPT

## 2025-06-10 PROCEDURE — 83690 ASSAY OF LIPASE: CPT

## 2025-06-10 PROCEDURE — 99214 OFFICE O/P EST MOD 30 MIN: CPT | Performed by: INTERNAL MEDICINE

## 2025-06-10 PROCEDURE — 85004 AUTOMATED DIFF WBC COUNT: CPT

## 2025-06-10 PROCEDURE — 36415 COLL VENOUS BLD VENIPUNCTURE: CPT

## 2025-06-10 PROCEDURE — 83735 ASSAY OF MAGNESIUM: CPT

## 2025-06-10 PROCEDURE — 83615 LACTATE (LD) (LDH) ENZYME: CPT

## 2025-06-10 PROCEDURE — 82150 ASSAY OF AMYLASE: CPT

## 2025-06-10 PROCEDURE — 81003 URINALYSIS AUTO W/O SCOPE: CPT

## 2025-06-10 PROCEDURE — 84100 ASSAY OF PHOSPHORUS: CPT

## 2025-06-10 PROCEDURE — 82378 CARCINOEMBRYONIC ANTIGEN: CPT

## 2025-06-10 PROCEDURE — 85610 PROTHROMBIN TIME: CPT

## 2025-06-10 PROCEDURE — 82947 ASSAY GLUCOSE BLOOD QUANT: CPT

## 2025-06-10 RX ORDER — OMEGA-3 FATTY ACIDS/FISH OIL 300-1000MG
CAPSULE ORAL
COMMUNITY
Start: 2025-04-07

## 2025-06-10 RX ORDER — HYDROMORPHONE HYDROCHLORIDE 2 MG/1
2-4 TABLET ORAL
COMMUNITY
Start: 2025-05-07

## 2025-06-10 ASSESSMENT — PATIENT HEALTH QUESTIONNAIRE - PHQ9: SUM OF ALL RESPONSES TO PHQ QUESTIONS 1-9: 11

## 2025-06-10 ASSESSMENT — PAIN SCALES - GENERAL: PAINLEVEL_OUTOF10: MODERATE PAIN (6)

## 2025-06-10 NOTE — NURSING NOTE
[id number]: Informed Consent Note      The consent form, including purpose, risks and benefits, was reviewed with Roula Darling, and all questions were answered before she signed the consent form. The patient understands that the study involves an active treatment phase as well as a post-treatment follow up phase.      Present during the discussion was Roula Darling, Dr Lora, Socorro Grubbs, and Katherin Raman. A copy of the signed form was provided to the patient. No procedures specific to this study were performed prior to the patient signing the consent form. Consent was signed by patient and Socorro Grubbs as this author is in training.     RACE AND ETHNICITY:  I discussed with Roula Darling that the [employer] (NCI) has asked that information on race and ethnicity be obtained from NCI-sponsored studies' participants whenever possible and that the purpose for this request is to assist the NCI in evaluating whether persons of all races and ethnicity are given the opportunity to participate in new cancer treatment options. It was explained that the information will be sent to the NCI in a way in which she cannot be identified. It was also explained that providing this information is voluntary.  Roula Darling provided the following responses:  Ethnicity: non-  Race: White    Consent version:  [facility]# [id number] Protocol V9  Main ICF Sponsor V7  N Local Version 07 Oct 2024    HIPAA form OGC-  Form Revision Date: 05.13.21

## 2025-06-10 NOTE — NURSING NOTE
Chief Complaint   Patient presents with    Labs Only    Blood Draw     Labs drawn via VPT by lab RN     Venipuncture labs drawn by lab RN.    Rajni Meier RN

## 2025-06-10 NOTE — LETTER
6/10/2025      Roula Darling  [address]  [place] [address]      Dear Colleague,    Thank you for referring your patient, Roula Darling, to the [facility]. Please see a copy of my visit note below.    [facility] THERAPEUT[facility]IN[facility]      PATIENT NAME: Roula Darling MRN # [id number]  DATE OF VISIT: June 9, 2025 YOB: 1975    Study: TORL1,23  Date of Consent: 6/10/25  C1D1: Tentatively 6/30/25    Cancer Type: Rectal Cancer  Primary Oncologist: Kiki Hernandez    Reason for Visit: follow-up      Interval History  Roula is here in follow-up.  She is here for a consenting visit for TORL 1,23 (Anti-Claudin6 adc).  She is feeling ok.  She notes significant fatigue all the time.  She is really not able to be very active as a result.  She is still doing activities - her daughter bought her a puppy (Border Collie) that is very high energy so she is the one taking the dog for walks daily - in and out of the house.  She is doing some light chores, but admits not very much.  She mostly is knitting and reading. She takes naps but only 10minutes at a time.  She is not having shortness of breath really, just gets tired easily.  She has some nausea with emesis at times.  She says smells really bother her so she is trying to keep the windows open more.  She otherwise is not really having any abdominal pain, no diarrhea or constipation.  She does not have any neuropathy from prior treatment, just some cold sensitivity from the oxaliplatin but that has resolved now.    Otherwise she is not really complaining of any pain today.         Cancer History  (2/15/2023 - 2/21/2023) Palliative radiation, 2500 cGy to pelvis in 5 fractions.  (2/22/2023 - 05/01/2023.) FOLFOX chemotherapy every 2 weeks.  05/01/2023: Oxaliplatin reaction needing EpiPen. Oxaliplatin desensitization failed 05/08/2023. Hence patient received 5 FU only that treatment  05/22/2023- 1/16/24: FOLFIRI with  growth factor support every 2 weeks. Progressive disease in the lungs  2/6/24-4/2/24: Lonsurf with bevacizumab  04/20/2024-5/20/24: Regorafenib  6/4/24- 07/30/2024. : clinical trial XTX-301  08/05/2024-02/05/2025: Oxaliplatin desensitization plan for FOLFOX   02/24/2025- 03/10/2025:5 FU alone  04/01/2025:Fruquintinib    Molecular/NGS  FoundationOne completed 03/13/2023. K-jerilyn G12V mutated.    Other mutations:  KRAS G12 V  NRAS wild type  [id number]  FBXW7  PTEN  SMAD4  Tp53     Microsatellite stable  TMB: 4 mut/MB     BRAF negative     Others: Her2 IHC1+    Pre-screening for TORL1, 23 - Claudin 6 negative (Reported on 3/19/25)    Past Medical History  Past Medical History:   Diagnosis Date     Asthma     High school;  now resolved     Colorectal cancer (H) 2023       Current Medications  Current Outpatient Medications   Medication Sig Dispense Refill     benzonatate (TESSALON) 100 MG capsule Take 100 mg by mouth.       lidocaine-prilocaine (EMLA) 2.5-2.5 % external cream APPLY TO PORT-A-CATH SITE ONE HOUR PRIOR TO NEEDLE ACCESS.       omeprazole (PRILOSEC) 20 MG DR capsule Take 20 mg by mouth.       ondansetron (ZOFRAN) 8 MG tablet Take 8 mg by mouth.       polyethylene glycol (MIRALAX) 17 g packet Take 17 g by mouth daily as needed.       prochlorperazine (COMPAZINE) 10 MG tablet Take 1 tablet by mouth every 6 hours as needed.       senna (SENOKOT) 8.6 MG tablet Take 2 tablets by mouth.          Review of Systems: As above in the HPI, o/w complete 12-point ROS was negative.    Physical Exam  /75 (BP Location: Right arm, Patient Position: Sitting, Cuff Size: Adult Regular)   Pulse 113   Temp 97.6  F (36.4  C) (Oral)   Resp 12   Wt 56.1 kg (123 lb 11.2 oz)   SpO2 99%   BMI 21.23 kg/m       GEN: NAD  HEENT: EOMI, no icterus, injection or pallor. Oropharynx clear  NECK: no cervical or supraclavicular lymphadenopathy  LUNGS: clear bilaterally  CV: regular, no murmurs, rubs, or gallops  ABDOMEN: soft,  non-tender, non-distended, normal bowel sounds  EXT: warm, well perfused, no edema  NEURO: alert  SKIN: no rashes    Laboratory and Imaging Studies    Mostly unremarkable.  Alk phos slightly elevated.   LDH was quite elevated at 600.   Otherwise CBC is ok. Hgb 11.5    Labs were independently reviewed and interpreted by me    No image results found.       Imaging was personally reviewed and interpreted by me      Assessment/Plan  Ms. Roula Darling is a 50 year old female with metastatic rectal cancer who returns to clinic for consideration of TORL1,23 on Claudin 6 negative arm. .    We discussed the rationale of the trial.   We discussed that while her Claudin 6 was negative there is precedent for ADCs working in the absence of the target.  We discussed some of the side effects of TORL1,23 - myelosuppression, diarrhea, neuropathy, eye toxicity and lung toxicity.  We also discussed the study requirements with regards to PK blood draws and schedule of being here.   We discussed alternatives - she is currently not on any treatment as she either did not tolerate treatment or really does not have standard of care options remaining.     She expressed interest in proceedign with the study and she did go through the consent process with Socorro Grubbs - CRC-RN (see separate consenting note).     ECOG PS: 1    Last scan was in March - will repeat scans for screening.           40 minutes spent on the date of the encounter doing chart review, review of outside records, review of test results, interpretation of tests, patient visit, and documentation        Again, thank you for allowing me to participate in the care of your patient.        Sincerely,        Grant Lora, DO    Electronically signed

## 2025-06-10 NOTE — NURSING NOTE
"Oncology Rooming Note    Yasmine 10, 2025 7:51 AM   Roula Darling is a 50 year old female who presents for:    Chief Complaint   Patient presents with    Oncology Clinic Visit     Rectal cancer      Initial Vitals: /75 (BP Location: Right arm, Patient Position: Sitting, Cuff Size: Adult Regular)   Pulse 113   Temp 97.6  F (36.4  C) (Oral)   Resp 12   Wt 56.1 kg (123 lb 11.2 oz)   SpO2 99%   BMI 21.23 kg/m   Estimated body mass index is 21.23 kg/m  as calculated from the following:    Height as of 9/6/24: 1.626 m (5' 4\").    Weight as of this encounter: 56.1 kg (123 lb 11.2 oz). Body surface area is 1.59 meters squared.  Moderate Pain (6) Comment: Data Unavailable   No LMP recorded.  Allergies reviewed: Yes  Medications reviewed: Yes    Medications: Medication refills not needed today.  Pharm[facility] into [facility] [address] [place] [address]    Frailty Screening:   Is the patient here for a new oncology consult visit in cancer care? 2. No    PHQ9:  Did this patient require a PHQ9?: Yes   If the patient required a PHQ9 assessment, did the results require a follow up with the Provider/Nurse?: Yes     Patient completed the PHQ-9 assessment for depression and scored >9? Yes  Question 9 on the PHQ-9 was positive for suicidality? No  Does patient have current mental health provider? No  Is this a virtual visit? No    I personally notified the following: visit provider                   Clinical concerns: none      Lucy Guillen"

## 2025-06-11 LAB
ATRIAL RATE - MUSE: 104 BPM
DIASTOLIC BLOOD PRESSURE - MUSE: NORMAL MMHG
INTERPRETATION ECG - MUSE: NORMAL
P AXIS - MUSE: 76 DEGREES
PR INTERVAL - MUSE: 158 MS
QRS DURATION - MUSE: 76 MS
QT - MUSE: 352 MS
QTC - MUSE: 462 MS
R AXIS - MUSE: 46 DEGREES
SYSTOLIC BLOOD PRESSURE - MUSE: NORMAL MMHG
T AXIS - MUSE: 57 DEGREES
VENTRICULAR RATE- MUSE: 104 BPM

## 2025-06-17 ENCOUNTER — ALLIED HEALTH/NURSE VISIT (OUTPATIENT)
Dept: ONCOLOGY | Facility: CLINIC | Age: 50
End: 2025-06-17
Payer: COMMERCIAL

## 2025-06-17 DIAGNOSIS — Z00.6 EXAMINATION OF PARTICIPANT IN CLINICAL TRIAL: Primary | ICD-10-CM

## 2025-06-20 ENCOUNTER — ANCILLARY PROCEDURE (OUTPATIENT)
Facility: CLINIC | Age: 50
End: 2025-06-20
Attending: INTERNAL MEDICINE
Payer: COMMERCIAL

## 2025-06-20 ENCOUNTER — ANCILLARY PROCEDURE (OUTPATIENT)
Dept: CT IMAGING | Facility: CLINIC | Age: 50
End: 2025-06-20
Attending: INTERNAL MEDICINE
Payer: COMMERCIAL

## 2025-06-20 DIAGNOSIS — Z00.6 EXAMINATION OF PARTICIPANT IN CLINICAL TRIAL: ICD-10-CM

## 2025-06-20 PROCEDURE — 74177 CT ABD & PELVIS W/CONTRAST: CPT | Mod: GC | Performed by: RADIOLOGY

## 2025-06-20 PROCEDURE — 99207 CT RESEARCH READING: CPT | Performed by: RADIOLOGY

## 2025-06-20 PROCEDURE — 71260 CT THORAX DX C+: CPT | Mod: GC | Performed by: RADIOLOGY

## 2025-06-20 RX ORDER — IOPAMIDOL 755 MG/ML
72 INJECTION, SOLUTION INTRAVASCULAR ONCE
Status: COMPLETED | OUTPATIENT
Start: 2025-06-20 | End: 2025-06-20

## 2025-06-20 RX ADMIN — IOPAMIDOL 72 ML: 755 INJECTION, SOLUTION INTRAVASCULAR at 09:34

## 2025-06-20 NOTE — DISCHARGE INSTRUCTIONS

## 2025-06-30 ENCOUNTER — HOSPITAL ENCOUNTER (OUTPATIENT)
Dept: RESEARCH | Facility: CLINIC | Age: 50
Discharge: HOME | End: 2025-06-30
Attending: OBSTETRICS & GYNECOLOGY | Admitting: OBSTETRICS & GYNECOLOGY
Payer: COMMERCIAL

## 2025-06-30 ENCOUNTER — ALLIED HEALTH/NURSE VISIT (OUTPATIENT)
Dept: ONCOLOGY | Facility: CLINIC | Age: 50
End: 2025-06-30

## 2025-06-30 VITALS
HEART RATE: 91 BPM | SYSTOLIC BLOOD PRESSURE: 119 MMHG | BODY MASS INDEX: 19.43 KG/M2 | TEMPERATURE: 99 F | OXYGEN SATURATION: 100 % | HEIGHT: 65 IN | WEIGHT: 116.62 LBS | DIASTOLIC BLOOD PRESSURE: 76 MMHG | RESPIRATION RATE: 17 BRPM

## 2025-06-30 DIAGNOSIS — C20 RECTAL CANCER (H): Primary | ICD-10-CM

## 2025-06-30 DIAGNOSIS — Z00.6 CLINICAL TRIAL PARTICIPANT: Primary | ICD-10-CM

## 2025-06-30 DIAGNOSIS — R11.2 NAUSEA AND VOMITING, UNSPECIFIED VOMITING TYPE: ICD-10-CM

## 2025-06-30 DIAGNOSIS — Z00.6 EXAMINATION OF PARTICIPANT OR CONTROL IN CLINICAL RESEARCH: ICD-10-CM

## 2025-06-30 DIAGNOSIS — Z00.6 EXAMINATION OF PARTICIPANT IN CLINICAL TRIAL: Primary | ICD-10-CM

## 2025-06-30 LAB
ALBUMIN SERPL BCG-MCNC: 3.7 G/DL (ref 3.5–5.2)
ALBUMIN UR-MCNC: 10 MG/DL
ALP SERPL-CCNC: 167 U/L (ref 40–150)
ALT SERPL W P-5'-P-CCNC: 9 U/L (ref 0–50)
AMYLASE SERPL-CCNC: 37 U/L (ref 28–100)
ANION GAP SERPL CALCULATED.3IONS-SCNC: 21 MMOL/L (ref 7–15)
APPEARANCE UR: CLEAR
AST SERPL W P-5'-P-CCNC: 39 U/L (ref 0–45)
BASOPHILS # BLD AUTO: 0.1 10E3/UL (ref 0–0.2)
BASOPHILS NFR BLD AUTO: 1 %
BILIRUB SERPL-MCNC: 0.5 MG/DL
BILIRUB UR QL STRIP: NEGATIVE
BUN SERPL-MCNC: 4.3 MG/DL (ref 6–20)
CALCIUM SERPL-MCNC: 9 MG/DL (ref 8.8–10.4)
CEA SERPL-MCNC: 10.1 NG/ML
CHLORIDE SERPL-SCNC: 93 MMOL/L (ref 98–107)
COLOR UR AUTO: ABNORMAL
CREAT SERPL-MCNC: 0.45 MG/DL (ref 0.51–0.95)
EGFRCR SERPLBLD CKD-EPI 2021: >90 ML/MIN/1.73M2
EOSINOPHIL # BLD AUTO: 0 10E3/UL (ref 0–0.7)
EOSINOPHIL NFR BLD AUTO: 0 %
ERYTHROCYTE [DISTWIDTH] IN BLOOD BY AUTOMATED COUNT: 18 % (ref 10–15)
GLUCOSE SERPL-MCNC: 75 MG/DL (ref 70–99)
GLUCOSE UR STRIP-MCNC: NEGATIVE MG/DL
HCO3 SERPL-SCNC: 18 MMOL/L (ref 22–29)
HCT VFR BLD AUTO: 31.7 % (ref 35–47)
HGB BLD-MCNC: 10 G/DL (ref 11.7–15.7)
HGB UR QL STRIP: NEGATIVE
HYALINE CASTS: 3 /LPF
IMM GRANULOCYTES # BLD: 0 10E3/UL
IMM GRANULOCYTES NFR BLD: 0 %
INR PPP: 1.31 (ref 0.85–1.15)
KETONES UR STRIP-MCNC: >150 MG/DL
LDH SERPL L TO P-CCNC: 828 U/L (ref 0–250)
LEUKOCYTE ESTERASE UR QL STRIP: NEGATIVE
LIPASE SERPL-CCNC: 18 U/L (ref 13–60)
LYMPHOCYTES # BLD AUTO: 0.4 10E3/UL (ref 0.8–5.3)
LYMPHOCYTES NFR BLD AUTO: 5 %
MAGNESIUM SERPL-MCNC: 1.9 MG/DL (ref 1.7–2.3)
MCH RBC QN AUTO: 24.4 PG (ref 26.5–33)
MCHC RBC AUTO-ENTMCNC: 31.5 G/DL (ref 31.5–36.5)
MCV RBC AUTO: 78 FL (ref 78–100)
MONOCYTES # BLD AUTO: 0.7 10E3/UL (ref 0–1.3)
MONOCYTES NFR BLD AUTO: 8 %
MUCOUS THREADS #/AREA URNS LPF: PRESENT /LPF
NEUTROPHILS # BLD AUTO: 6.9 10E3/UL (ref 1.6–8.3)
NEUTROPHILS NFR BLD AUTO: 85 %
NITRATE UR QL: NEGATIVE
NRBC # BLD AUTO: 0 10E3/UL
NRBC BLD AUTO-RTO: 0 /100
PH UR STRIP: 5.5 [PH] (ref 5–7)
PHOSPHATE SERPL-MCNC: 3 MG/DL (ref 2.5–4.5)
PLATELET # BLD AUTO: 506 10E3/UL (ref 150–450)
POTASSIUM SERPL-SCNC: 3.3 MMOL/L (ref 3.4–5.3)
PROT SERPL-MCNC: 7.3 G/DL (ref 6.4–8.3)
PROTHROMBIN TIME: 16.2 SECONDS (ref 11.8–14.8)
RBC # BLD AUTO: 4.09 10E6/UL (ref 3.8–5.2)
RBC URINE: <1 /HPF
SODIUM SERPL-SCNC: 132 MMOL/L (ref 135–145)
SP GR UR STRIP: 1.01 (ref 1–1.03)
SQUAMOUS EPITHELIAL: <1 /HPF
UROBILINOGEN UR STRIP-MCNC: NORMAL MG/DL
WBC # BLD AUTO: 8.1 10E3/UL (ref 4–11)
WBC URINE: 1 /HPF

## 2025-06-30 PROCEDURE — 510N000009 HC RESEARCH FACILITY, PER 15 MIN

## 2025-06-30 PROCEDURE — 93005 ELECTROCARDIOGRAM TRACING: CPT

## 2025-06-30 PROCEDURE — 510N000025 HC RESEARCH B&I EARLY OR LATE SURCHARGE

## 2025-06-30 PROCEDURE — 300N000007 HC RESEARCH B&I SPECIMEN PROCESSING, SIMPLE

## 2025-06-30 PROCEDURE — 999N000129 HC STATISTIC PICC/MID LINE PROC CANCELLED SUBQ WORKUP

## 2025-06-30 PROCEDURE — 81001 URINALYSIS AUTO W/SCOPE: CPT | Performed by: STUDENT IN AN ORGANIZED HEALTH CARE EDUCATION/TRAINING PROGRAM

## 2025-06-30 PROCEDURE — 82378 CARCINOEMBRYONIC ANTIGEN: CPT | Performed by: STUDENT IN AN ORGANIZED HEALTH CARE EDUCATION/TRAINING PROGRAM

## 2025-06-30 PROCEDURE — 36000 PLACE NEEDLE IN VEIN: CPT

## 2025-06-30 PROCEDURE — 83615 LACTATE (LD) (LDH) ENZYME: CPT | Performed by: STUDENT IN AN ORGANIZED HEALTH CARE EDUCATION/TRAINING PROGRAM

## 2025-06-30 PROCEDURE — 83690 ASSAY OF LIPASE: CPT | Performed by: STUDENT IN AN ORGANIZED HEALTH CARE EDUCATION/TRAINING PROGRAM

## 2025-06-30 PROCEDURE — 250N000011 HC RX IP 250 OP 636: Performed by: STUDENT IN AN ORGANIZED HEALTH CARE EDUCATION/TRAINING PROGRAM

## 2025-06-30 PROCEDURE — 300N000024

## 2025-06-30 PROCEDURE — 510N000029 HC RESEARCH B&I MEALS, PER MEAL

## 2025-06-30 PROCEDURE — 82150 ASSAY OF AMYLASE: CPT | Performed by: STUDENT IN AN ORGANIZED HEALTH CARE EDUCATION/TRAINING PROGRAM

## 2025-06-30 PROCEDURE — 84100 ASSAY OF PHOSPHORUS: CPT | Performed by: STUDENT IN AN ORGANIZED HEALTH CARE EDUCATION/TRAINING PROGRAM

## 2025-06-30 PROCEDURE — 83735 ASSAY OF MAGNESIUM: CPT | Performed by: STUDENT IN AN ORGANIZED HEALTH CARE EDUCATION/TRAINING PROGRAM

## 2025-06-30 PROCEDURE — 80053 COMPREHEN METABOLIC PANEL: CPT | Performed by: STUDENT IN AN ORGANIZED HEALTH CARE EDUCATION/TRAINING PROGRAM

## 2025-06-30 PROCEDURE — 510N000023 HC CRU B&I PATIENT CARE, PER 15 MIN

## 2025-06-30 PROCEDURE — 85004 AUTOMATED DIFF WBC COUNT: CPT | Performed by: STUDENT IN AN ORGANIZED HEALTH CARE EDUCATION/TRAINING PROGRAM

## 2025-06-30 PROCEDURE — 96361 HYDRATE IV INFUSION ADD-ON: CPT

## 2025-06-30 PROCEDURE — 258N000003 HC RX IP 258 OP 636: Performed by: STUDENT IN AN ORGANIZED HEALTH CARE EDUCATION/TRAINING PROGRAM

## 2025-06-30 PROCEDURE — 36415 COLL VENOUS BLD VENIPUNCTURE: CPT | Performed by: STUDENT IN AN ORGANIZED HEALTH CARE EDUCATION/TRAINING PROGRAM

## 2025-06-30 PROCEDURE — 96365 THER/PROPH/DIAG IV INF INIT: CPT

## 2025-06-30 PROCEDURE — 85610 PROTHROMBIN TIME: CPT | Performed by: STUDENT IN AN ORGANIZED HEALTH CARE EDUCATION/TRAINING PROGRAM

## 2025-06-30 PROCEDURE — 300N000025 HC RESEARCH LAB DELIVERY SURCHARGE, PER DELIVERY

## 2025-06-30 RX ORDER — DIPHENHYDRAMINE HYDROCHLORIDE 50 MG/ML
25 INJECTION, SOLUTION INTRAMUSCULAR; INTRAVENOUS
Status: DISCONTINUED | OUTPATIENT
Start: 2025-06-30 | End: 2025-07-01 | Stop reason: HOSPADM

## 2025-06-30 RX ORDER — HEPARIN SODIUM,PORCINE 10 UNIT/ML
5-20 VIAL (ML) INTRAVENOUS DAILY PRN
Status: DISCONTINUED | OUTPATIENT
Start: 2025-06-30 | End: 2025-07-01 | Stop reason: HOSPADM

## 2025-06-30 RX ORDER — ALBUTEROL SULFATE 0.83 MG/ML
2.5 SOLUTION RESPIRATORY (INHALATION)
Status: DISCONTINUED | OUTPATIENT
Start: 2025-06-30 | End: 2025-07-01 | Stop reason: HOSPADM

## 2025-06-30 RX ORDER — HEPARIN SODIUM (PORCINE) LOCK FLUSH IV SOLN 100 UNIT/ML 100 UNIT/ML
5 SOLUTION INTRAVENOUS
Status: DISCONTINUED | OUTPATIENT
Start: 2025-06-30 | End: 2025-07-01 | Stop reason: HOSPADM

## 2025-06-30 RX ORDER — ONDANSETRON 8 MG/1
8 TABLET, ORALLY DISINTEGRATING ORAL EVERY 8 HOURS PRN
Qty: 60 TABLET | Refills: 3 | Status: SHIPPED | OUTPATIENT
Start: 2025-06-30

## 2025-06-30 RX ORDER — ALBUTEROL SULFATE 90 UG/1
1-2 INHALANT RESPIRATORY (INHALATION)
Status: DISCONTINUED | OUTPATIENT
Start: 2025-06-30 | End: 2025-07-01 | Stop reason: HOSPADM

## 2025-06-30 RX ORDER — DIPHENHYDRAMINE HYDROCHLORIDE 50 MG/ML
50 INJECTION, SOLUTION INTRAMUSCULAR; INTRAVENOUS
Status: DISCONTINUED | OUTPATIENT
Start: 2025-06-30 | End: 2025-07-01 | Stop reason: HOSPADM

## 2025-06-30 RX ORDER — MEPERIDINE HYDROCHLORIDE 25 MG/ML
25 INJECTION INTRAMUSCULAR; INTRAVENOUS; SUBCUTANEOUS
Status: DISCONTINUED | OUTPATIENT
Start: 2025-06-30 | End: 2025-07-01 | Stop reason: HOSPADM

## 2025-06-30 RX ORDER — LORAZEPAM 2 MG/ML
0.5 INJECTION INTRAMUSCULAR EVERY 4 HOURS PRN
Status: DISCONTINUED | OUTPATIENT
Start: 2025-06-30 | End: 2025-07-01 | Stop reason: HOSPADM

## 2025-06-30 RX ORDER — EPINEPHRINE 1 MG/ML
0.3 INJECTION, SOLUTION, CONCENTRATE INTRAVENOUS EVERY 5 MIN PRN
Status: DISCONTINUED | OUTPATIENT
Start: 2025-06-30 | End: 2025-07-01 | Stop reason: HOSPADM

## 2025-06-30 RX ORDER — METHYLPREDNISOLONE SODIUM SUCCINATE 40 MG/ML
40 INJECTION INTRAMUSCULAR; INTRAVENOUS
Status: DISCONTINUED | OUTPATIENT
Start: 2025-06-30 | End: 2025-07-01 | Stop reason: HOSPADM

## 2025-06-30 RX ADMIN — SODIUM CHLORIDE, POTASSIUM CHLORIDE, SODIUM LACTATE AND CALCIUM CHLORIDE 1000 ML: 600; 310; 30; 20 INJECTION, SOLUTION INTRAVENOUS at 16:40

## 2025-06-30 RX ADMIN — SODIUM CHLORIDE, PRESERVATIVE FREE 5 ML: 5 INJECTION INTRAVENOUS at 18:32

## 2025-06-30 RX ADMIN — SODIUM CHLORIDE 250 ML: 9 INJECTION, SOLUTION INTRAVENOUS at 10:23

## 2025-06-30 NOTE — PROGRESS NOTES
" CLINICAL TRIAL VISIT NOTE      Date of Visit: 6/30/2025     Study: A Phase 1, First in Human, Dose-Escalation Study of TORL-1-23 in Participants with Advanced Cancer     PI: Dr Duran  Consenting Physician: Dr Lora    Patient Name: Roula Darling  Subject ID: [id number]  Dose Level: 2.4 mg/kg  Cancer Type: Rectal     Visit: C1D1     Oncology History:  - Regimen 1: FOLFOX, 22 Feb 2023 - 08 May 2023, d/c oxaliplatin toxicity; FOLFIRI, 08 May 2023 - 16 Jun 2024, Progressive Disease  - Regimen 2: Lonsurf and Bevacizumab, 06 Feb 2024 - 02 Apr 2024, Progressive Disease  - Regimen 3: Regorafenib, 20 Apr 2024 - 20 May 2024, Progressive Disease  - Regimen 4: XTX-301, 04 Jun 2024 - 30 Jul 2024, Progressive Disease  - Regimen 5: FOLFOX, 05 Aug 2024 - 10 Mar 2025, Progressive Disease  - Regimen 6: Fruqunitinib, 01 Apr 2025 - 31 May 2025, Progressive Disease       Clinical Trial Treatment History:  - 30 Jun 2025: C1D1, 2.4 mg/kg, CEA 10.1    PMHx:  - Fatigue Grade 1; start 25 Nov 2024  - Intermittent headaches Grade 1; start 18 Jul 2024  - Anorexia Grade 2; start 04 Jun 2025  - ALP increase Grade 1; start 10 Blayne 2025  - LDH increase Grade 1; start 10 Blayne 2025  - Anemia Grade 1; start 10 Blayne 2025  - Lymphocyte count decreased Grade 3; start 30 Jun 2025  - Intermittent nausea Grade 2; start 18 Jul 2024  - Intermittent vomiting Grade 1; start 2023  - Hyponatremia Grade 1; start 30 Jun 2025  - Intermittent hypokalemia Grade 1; start 30 Jun 2025  - GERD Grade 2; start 2024    Former smoker, no current alcohol use    Physical Exam:  /73 (BP Location: Left arm, Patient Position: Chair, Cuff Size: Adult Regular)   Pulse 104   Temp 98.4  F (36.9  C) (Temporal)   Resp 18   Ht 1.645 m (5' 4.76\")   Wt 52.9 kg (116 lb 10 oz)   SpO2 100%   BMI 19.55 kg/m       Baseline Weight: 52.9 kg  Current Weight: N/A  % Weight Change from Baseline: N/A  Wt Readings from Last 4 Encounters:   06/30/25 52.9 kg (116 lb 10 oz)   06/10/25 " 56.1 kg (123 lb 11.2 oz)   02/19/25 62.9 kg (138 lb 11.2 oz)   09/06/24 64.4 kg (142 lb)       Lab Results:  Labs were drawn- any significant lab values were addressed and reviewed.       Ongoing Adverse Events:  - N/A    New Adverse Events:  - N/A    Concomitant Medications:  Medications have been reviewed and documented in patient's research file. See full list of current outpatient medications below:     Current Outpatient Medications   Medication Sig Dispense Refill    benzonatate (TESSALON) 100 MG capsule Take 100 mg by mouth.      HYDROmorphone (DILAUDID) 2 MG tablet Take 2-4 mg by mouth.      ibuprofen (ADVIL/MOTRIN) 200 MG capsule       lidocaine-prilocaine (EMLA) 2.5-2.5 % external cream APPLY TO PORT-A-CATH SITE ONE HOUR PRIOR TO NEEDLE ACCESS.      ondansetron (ZOFRAN ODT) 8 MG ODT tab Take 1 tablet (8 mg) by mouth every 8 hours as needed for nausea. 60 tablet 3    ondansetron (ZOFRAN) 8 MG tablet Take 8 mg by mouth.      polyethylene glycol (MIRALAX) 17 g packet Take 17 g by mouth daily as needed.      prochlorperazine (COMPAZINE) 10 MG tablet Take 1 tablet by mouth every 6 hours as needed.      senna (SENOKOT) 8.6 MG tablet Take 2 tablets by mouth.          Performance Status (ECOG): 1    GRADE ECOG PERFORMANCE STATUS   0 Fully active, able to carry on all pre-disease performance without restriction   1 Restricted in physically strenuous activity but ambulatory and able to carry out work of a light   2 Ambulatory and capable of all selfcare but unable to carry out any work activities; up and about more than 50% of waking hours   3 Capable of only limited selfcare; confined to bed or chair more than 50% of waking hours   4 Completely disabled; cannot carry on any selfcare; totally confined to bed or chair   5 Dead        RESEARCH COORDINATOR:       Research Impression:   Roula presents today for C1D1. She currently has nausea and was recently seen in the ED 2 days ago for nausea, vomiting, and  dehydration. She no longer is vomiting. Nausea has made it difficult to eat/drink and she has continued to lose weight since she stopped her last treatment. She is able to take some fluids down. She has left sided headaches which have been present for some time. Denies any current fevers, chest pain, shortness of breath, rashes or peripheral neuropathy.    Roula was given the opportunity to ask any trial related questions. The patient will be back tomorrow for C1D2. The patient knows to call with any new or worsening symptoms or concerns.      Please see provider progress note for full physical exam and other clinical information.      Navneet Abarca, Clinical Research Coordinator  [facility], [facility]   Developmental Therapeutics Clinic  Clinical Trials Office

## 2025-06-30 NOTE — PROGRESS NOTES
[facility]      PATIENT NAME: Roula Darling MRN # [id number]  DATE OF VISIT: June 30, 2025 YOB: 1975    Study: TORL 1-23  Date of Consent: 06/10/25  C1D1: 06/30/25    Cancer Type: Rectal Cancer  Primary Oncologist: Kiki Hernandez    Reason for Visit: C1D1 of treatment    Interval History  - 3/10 nausea right now, well controlled on PRN's, including additional Reglan given in the ER  - no dizziness or lightheadedness  - persistent L-sided headache for the past 3 months, low grade; workup has been negative  - no numbness/tingling, or neuropathy  - stable weakness    Cancer History  (2/15/2023 - 2/21/2023) Palliative radiation, 2500 cGy to pelvis in 5 fractions.  (2/22/2023 - 05/01/2023.) FOLFOX chemotherapy every 2 weeks.  05/01/2023: Oxaliplatin reaction needing EpiPen. Oxaliplatin desensitization failed 05/08/2023. Hence patient received 5 FU only that treatment  05/22/2023- 1/16/24: FOLFIRI with growth factor support every 2 weeks. Progressive disease in the lungs  2/6/24-4/2/24: Lonsurf with bevacizumab  04/20/2024-5/20/24: Regorafenib  6/4/24- 07/30/2024. : clinical trial XTX-301  08/05/2024-02/05/2025: Oxaliplatin desensitization plan for FOLFOX   02/24/2025- 03/10/2025:5 FU alone  04/01/2025:Fruquintinib     Molecular/NGS  [facility] completed 03/13/2023. K-jerilyn G12V mutated.    Other mutations:  KRAS G12 V  NRAS wild type  [id number]  FBXW7  PTEN  SMAD4  Tp53     Microsatellite stable  TMB: 4 mut/MB     BRAF negative     Others: Her2 IHC1+     Pre-screening for TORL1, 23 - Claudin 6 negative (Reported on 3/19/25)    Past Medical History  Past Medical History:   Diagnosis Date    Asthma     High school;  now resolved    Colorectal cancer (H) 2023       Current Medications  Current Outpatient Medications   Medication Sig Dispense Refill    benzonatate (TESSALON) 100 MG capsule Take 100 mg by mouth.      HYDROmorphone (DILAUDID) 2 MG tablet Take 2-4 mg by mouth.       "ibuprofen (ADVIL/MOTRIN) 200 MG capsule       lidocaine-prilocaine (EMLA) 2.5-2.5 % external cream APPLY TO PORT-A-CATH SITE ONE HOUR PRIOR TO NEEDLE ACCESS.      ondansetron (ZOFRAN) 8 MG tablet Take 8 mg by mouth.      polyethylene glycol (MIRALAX) 17 g packet Take 17 g by mouth daily as needed.      prochlorperazine (COMPAZINE) 10 MG tablet Take 1 tablet by mouth every 6 hours as needed.      senna (SENOKOT) 8.6 MG tablet Take 2 tablets by mouth.          Review of Systems: As above in the HPI, o/w complete 12-point ROS was negative.    Physical Exam  /73 (BP Location: Left arm, Patient Position: Chair, Cuff Size: Adult Regular)   Pulse 104   Temp 98.4  F (36.9  C) (Temporal)   Resp 18   Ht 1.645 m (5' 4.76\")   Wt 52.9 kg (116 lb 10 oz)   SpO2 100%   BMI 19.55 kg/m     GEN: NAD  HEENT: EOMI, no icterus, injection or pallor. Oropharynx clear  NECK: no cervical or supraclavicular lymphadenopathy  LUNGS: clear bilaterally  CV: regular, no murmurs, rubs, or gallops  ABDOMEN: soft, non-tender, non-distended, normal bowel sounds  EXT: warm, well perfused, no edema  NEURO: alert  SKIN: no rashes    Laboratory and Imaging Studies      Labs were independently reviewed and interpreted by me    CT Chest/Abdomen/Pelvis w Contrast  Narrative: EXAMINATION: CT CHEST/ABDOMEN/PELVIS W CONTRAST, 6/20/2025 9:45 AM    INDICATION: Examination of participant in clinical trial    COMPARISON STUDY: 2/9/2025 and12/8/2024    TECHNIQUE: CT scan of the chest, abdomen and pelvis was performed on  multidetector CT scanner using volumetric acquisition technique and  images were reconstructed in multiple planes with variable thickness  and reviewed on dedicated workstations.     CONTRAST: Isovue 370 72cc injected IV without oral contrast    CT scan radiation dose is optimized to minimum requisite dose using  automated dose modulation techniques.    FINDINGS:    Lungs/pleura: Increased size and number of multiple lung " metastases  for example in the left upper lobe series 3 image 88 anterior mass  measures 3.4 x 2.7 cm posterior mass measures 3.4 x 3.3 cm, previously  2.1 x 2.2 cm and 2.1 x 3.0 cm respectively. Right upper lobe nodules  seen on series 4 image 114 measure 1.7 x 1.2 cm and 1.8 x 1.3 cm,  previously 1.0 x 0.9 cm and 0.9 x 0.9 cm. Left lower lobe mass seen on  series 3 image 132 has increased in size in the left lower lobe  vasculature. No pleural effusion. No pneumothorax. Unchanged right  lower lobe calcified granuloma.    Mediastinum: Heart size is within normal limits. Ascending thoracic  aorta is borderline ectatic measuring 3.2 cm. Normal caliber. The main  pulmonary artery. Mild coronary artery calcifications. No significant  pericardial effusion. Calcified mediastinal lymph nodes additional  enlarged hilar lymph nodes for example series 3 image 114 measuring  1.2 on the short axis seen on series 3 image 113 with hypoattenuating  center. This previously measured 0.9 cm in the short axis. Right chest  wall Port-A-Cath with tip at the cavoatrial junction.    Liver: Numerous hepatic metastases have significantly increased in  size the largest is seen on series 3 image 288 measuring 6.4 x 6.8 cm,  previously 3.4 x 3.3 cm. Focal fatty deposition along the falciform  ligament. Unchanged simple hepatic cysts in the right hepatic dome  seen on series 3 image 212. Additional large hepatic metastases is  seen on series 3 image 339 measuring 3.4 x 3.3 cm, previously 2.0 x  1.9 cm. No intrahepatic biliary ductal dilation.    Biliary System: Normal gallbladder. No extrahepatic biliary ductal  dilation.    Pancreas: No mass or pancreatic ductal dilation.    Adrenal glands: Multiple right intrarenal metastases have increased in  size example series 3 image 222 measuring 1.6 x 1.8 cm, previously 1.3  x 1.7 cm. Left adrenal gland is within normal limits.    Spleen: Normal.    Kidneys: No suspicious mass, obstructing calculus or  hydronephrosis.    Gastrointestinal tract: Normal appendix. Normal caliber small bowel.   Grossly similar asymmetric to the left rectal wall thickening. Soft  tissue extends to the left mesorectal fascia and potentially the left  vaginal fornix.    Mesentery/peritoneum/retroperitoneum: No mass. No free fluid or air.   Small fat-containing umbilical hernia.    Lymph nodes: Multiple periportal and retroperitoneal enlarged lymph  nodes, for example series 3 image 282.    Vasculature: Patent major abdominal vasculature.    Pelvis: Urinary bladder is normal.    Osseous structures: No aggressive or acute osseous lesion.      Soft tissues: Within normal limits.  Impression: IMPRESSION:   1. Significant interval increase of disease burden within the liver,  right adrenal gland, and lung metastases. Multiple enlarging  periportal, retroperitoneal, mediastinal, and hilar lymph nodes.  2. Mural thickening and hyperemia rectal wall is similar to prior  imaging and represents primary neoplasm.    I have personally reviewed the examination and initial interpretation  and I agree with the findings.    CARRILLO DEAL MD         SYSTEM ID:  [id number]       Imaging was personally reviewed and interpreted by me      Assessment/Plan  Ms. Roula Darling is a 50 year old female with metastatic rectal cancer who returns to clinic for consideration of TORL 1-23 on CLDN6-negative arm.    ECOG PS: 1  Measureable disease: yes; significant pulmonary mets    #Metastatic Rectal Adenocarcinoma  She continues to have clinical progression of her cancer with a recent ER visit for poor PO intake and difficulty in stooling. She has since been given additional PRN anti-emetics and stool softeners, which she is using with success. I reviewed the reasoning behind the clinical trial and our anticipation regarding its outcome (fundamentally unknowable). We will monitor her clinical symptoms and her treatment outcome throughout the treatment per the  protocol.     #Headache  Chronic headache of unclear etiology. Not clearly related to treatment or disease. We will continue to monitor    #Nausea; Vomiting; Constipation  She has been having challenges with PO intake due to chronic nausea, with intermittent vomiting. This required presentation to ER last weekend, where she was additionally given Reglan, which has improved her symptoms. We will prescribe sublingual Zofran for further benefit and continue to monitor.    120 minutes spent on the date of the encounter doing chart review, review of test results, interpretation of tests, patient visit, documentation, and discussion with other provider(s)     The longitudinal plan of care for the diagnosis(es)/condition(s) as documented were addressed during this visit. Due to the added complexity in care, I will continue to support Roula in the subsequent management and with ongoing continuity of care.    Bk Figueroa MD, PhD   of Medicine  Division of Hematology, Oncology and Transplantation  [facility]

## 2025-06-30 NOTE — PROGRESS NOTES
"Clinical Research Unit Nursing Note:  Assumed patient care at 1515. Checked in on patient, was only able to eat 1 or 2 bites of mashed potatoes all day and ~10 oz of water due to nausea. Denies vomiting. States she was able to eat 1/3 a pancake this morning after she took Reglan. States doesn't feel like anything has worsened since TORL administration beyond baseline. Given recent labs, notified CRC who contacted Dr. Figueroa. 1L LR ordered.   Recent Labs   Lab Test 06/30/25  0738 06/10/25  0951   * 138   POTASSIUM 3.3* 4.0   CHLORIDE 93* 101   CO2 18* 23   ANIONGAP 21* 14   GLC 75 92   BUN 4.3* 7.6   CR 0.45* 0.56   EBONIE 9.0 9.8     Observed on unit until 7 hrs post end of infusion. Vitals, labs, and EKG's obtained per protocol. HR down to 91 after fluids. Had slight chills so checked temperature, 100.6 temporal but was wearing knit hat. Rechecked oral temperature - 99. Denies any other symptoms. Advised to monitor temperature at home and report if becomes febrile.     Patient Vitals for the past 24 hrs:   BP Temp Temp src Pulse Resp SpO2 Height Weight   06/30/25 1757 -- 99  F (37.2  C) Oral -- -- -- -- --   06/30/25 1756 119/76 100.6  F (38.1  C) Temporal 91 17 100 % -- --   06/30/25 1345 110/68 98.4  F (36.9  C) Temporal 99 18 98 % -- --   06/30/25 1109 101/71 98.8  F (37.1  C) Temporal 108 18 98 % -- --   06/30/25 1007 100/67 98.2  F (36.8  C) Temporal 112 18 95 % -- --   06/30/25 0725 107/73 98.4  F (36.9  C) Temporal 104 18 100 % 1.645 m (5' 4.76\") 52.9 kg (116 lb 10 oz)      Additional Medications Given: 1 L Lactated Ringers    Post Infusion Assessment:  Patient tolerated without incident.  No symptoms, ok to discharge.    Intravenous Access:  Port heparin locked and de-accessed per protocol. States would rather have peripheral stick for labs tomorrow.     Discharge Plan:   Patient discharged in stable condition.  Departure Mode: Ambulatory.    Shanika Ramírez RN on 6/30/2025 at 4:46 PM    "

## 2025-06-30 NOTE — PROGRESS NOTES
"Clinical Research Unit Nursing Note:  Roula Darling presents today for Cycle 1 Day 1, Study- TORL 1-23 (IDS #[id number]).     Patient seen by provider today: Yes                             present during visit today: Not Applicable.     Note:   Day 1 of Cycle 1.    Intravenous Access:  Port accessed for predose labs and infusions. Post dose labs collected via PIV.        Vitals and EKG obtained per protocol. Qtc WNL.      Study - TORL 1-23 (IDS# [id number]) intravenous infusion:   Dose: 2.4 mg/kg x 52.9 kg = 127 mg  Infusion started at 1023 with 250 ml NS concomitant fluids.  Infusion ended at 1053.     Chemo Double Check    Protocol verified?: Yes  Height and weight verified?: Yes  BSA confirmed?: Yes  Meets treatment parameters?: Yes  Initial RN verification: Savita Oro RN  Second RN verification: Jerilyn Banegas RN      Patient Vitals for the past 24 hrs:   BP Temp Temp src Pulse Resp SpO2 Height Weight   06/30/25 1345 110/68 98.4  F (36.9  C) Temporal 99 18 98 % -- --   06/30/25 1109 101/71 98.8  F (37.1  C) Temporal 108 18 98 % -- --   06/30/25 1007 100/67 98.2  F (36.8  C) Temporal 112 18 95 % -- --   06/30/25 0725 107/73 98.4  F (36.9  C) Temporal 104 18 100 % 1.645 m (5' 4.76\") 52.9 kg (116 lb 10 oz)        Report given to Shanika Ramírez RN @ 1515.    Savita Oro RN on 6/30/2025 at 3:17 PM      "

## 2025-07-01 ENCOUNTER — HOSPITAL ENCOUNTER (OUTPATIENT)
Dept: RESEARCH | Facility: CLINIC | Age: 50
Discharge: HOME | End: 2025-07-01
Attending: OBSTETRICS & GYNECOLOGY
Payer: COMMERCIAL

## 2025-07-01 VITALS
RESPIRATION RATE: 17 BRPM | HEART RATE: 109 BPM | TEMPERATURE: 97.7 F | DIASTOLIC BLOOD PRESSURE: 61 MMHG | OXYGEN SATURATION: 99 % | BODY MASS INDEX: 19.66 KG/M2 | SYSTOLIC BLOOD PRESSURE: 108 MMHG | WEIGHT: 117.28 LBS

## 2025-07-01 DIAGNOSIS — C20 RECTAL CANCER (H): ICD-10-CM

## 2025-07-01 DIAGNOSIS — Z00.6 EXAMINATION OF PARTICIPANT OR CONTROL IN CLINICAL RESEARCH: Primary | ICD-10-CM

## 2025-07-01 PROCEDURE — 510N000023 HC CRU B&I PATIENT CARE, PER 15 MIN

## 2025-07-01 PROCEDURE — 510N000009 HC RESEARCH FACILITY, PER 15 MIN

## 2025-07-01 PROCEDURE — 93005 ELECTROCARDIOGRAM TRACING: CPT

## 2025-07-01 PROCEDURE — 36000 PLACE NEEDLE IN VEIN: CPT

## 2025-07-01 PROCEDURE — 999N000129 HC STATISTIC PICC/MID LINE PROC CANCELLED SUBQ WORKUP

## 2025-07-01 PROCEDURE — 300N000007 HC RESEARCH B&I SPECIMEN PROCESSING, SIMPLE

## 2025-07-01 NOTE — ADDENDUM NOTE
Encounter addended by: Shanika Ramírez RN on: 7/1/2025 10:26 AM   Actions taken: Treatment plan modified

## 2025-07-01 NOTE — ADDENDUM NOTE
Encounter addended by: Zainab Reyes on: 7/1/2025 2:31 PM   Actions taken: Charge Capture section accepted

## 2025-07-01 NOTE — PROGRESS NOTES
Clinical Research Unit Nursing Note:  Roula Darling presents today for Cycle 1 Day 2, Study- TORL 1-23 (IDS #5968).     Patient seen by provider today: No                        Note:   Day 2 of Cycle 1. Patient denies fevers overnight. Reports one episode of small amount of clear emesis but was able to drink 5 glasses of water. No other complaints today.    IPatient Vitals for the past 24 hrs:   BP Temp Temp src Pulse Resp SpO2 Weight   07/01/25 0954 108/61 97.7  F (36.5  C) Oral 109 17 99 % 53.2 kg (117 lb 4.6 oz)      Vitals, labs, and EKG obtained per protocol. Qtc WNL.     Discharge Plan:   Patient discharged in stable condition.  Departure Mode: Ambulatory.     Shanika Ramírez RN on 7/1/2025 at 10:23 AM

## 2025-07-03 ENCOUNTER — HOSPITAL ENCOUNTER (OUTPATIENT)
Dept: RESEARCH | Facility: CLINIC | Age: 50
End: 2025-07-03
Attending: OBSTETRICS & GYNECOLOGY
Payer: COMMERCIAL

## 2025-07-03 ENCOUNTER — ALLIED HEALTH/NURSE VISIT (OUTPATIENT)
Dept: ONCOLOGY | Facility: CLINIC | Age: 50
End: 2025-07-03
Payer: COMMERCIAL

## 2025-07-03 VITALS
SYSTOLIC BLOOD PRESSURE: 102 MMHG | HEART RATE: 109 BPM | RESPIRATION RATE: 16 BRPM | OXYGEN SATURATION: 98 % | BODY MASS INDEX: 19.25 KG/M2 | DIASTOLIC BLOOD PRESSURE: 77 MMHG | TEMPERATURE: 98.2 F | WEIGHT: 114.86 LBS

## 2025-07-03 DIAGNOSIS — Z00.6 CLINICAL TRIAL PARTICIPANT: Primary | ICD-10-CM

## 2025-07-03 DIAGNOSIS — C20 RECTAL CANCER (H): ICD-10-CM

## 2025-07-03 DIAGNOSIS — Z00.6 EXAMINATION OF PARTICIPANT OR CONTROL IN CLINICAL RESEARCH: Primary | ICD-10-CM

## 2025-07-03 LAB
ANION GAP SERPL CALCULATED.3IONS-SCNC: 16 MMOL/L (ref 7–15)
BUN SERPL-MCNC: 3.1 MG/DL (ref 6–20)
CALCIUM SERPL-MCNC: 8.8 MG/DL (ref 8.8–10.4)
CHLORIDE SERPL-SCNC: 94 MMOL/L (ref 98–107)
CREAT SERPL-MCNC: 0.44 MG/DL (ref 0.51–0.95)
EGFRCR SERPLBLD CKD-EPI 2021: >90 ML/MIN/1.73M2
GLUCOSE SERPL-MCNC: 93 MG/DL (ref 70–99)
HCO3 SERPL-SCNC: 23 MMOL/L (ref 22–29)
POTASSIUM SERPL-SCNC: 3 MMOL/L (ref 3.4–5.3)
SODIUM SERPL-SCNC: 133 MMOL/L (ref 135–145)

## 2025-07-03 PROCEDURE — 999N000129 HC STATISTIC PICC/MID LINE PROC CANCELLED SUBQ WORKUP

## 2025-07-03 PROCEDURE — 510N000009 HC RESEARCH FACILITY, PER 15 MIN

## 2025-07-03 PROCEDURE — 36415 COLL VENOUS BLD VENIPUNCTURE: CPT | Performed by: STUDENT IN AN ORGANIZED HEALTH CARE EDUCATION/TRAINING PROGRAM

## 2025-07-03 PROCEDURE — 93005 ELECTROCARDIOGRAM TRACING: CPT

## 2025-07-03 PROCEDURE — 80048 BASIC METABOLIC PNL TOTAL CA: CPT | Performed by: STUDENT IN AN ORGANIZED HEALTH CARE EDUCATION/TRAINING PROGRAM

## 2025-07-03 PROCEDURE — 36000 PLACE NEEDLE IN VEIN: CPT

## 2025-07-03 PROCEDURE — 510N000023 HC CRU B&I PATIENT CARE, PER 15 MIN

## 2025-07-03 PROCEDURE — 300N000007 HC RESEARCH B&I SPECIMEN PROCESSING, SIMPLE

## 2025-07-03 RX ORDER — LORAZEPAM 0.5 MG/1
1 TABLET ORAL EVERY 6 HOURS PRN
Qty: 30 TABLET | Refills: 0 | Status: SHIPPED | OUTPATIENT
Start: 2025-07-03

## 2025-07-03 NOTE — NURSING NOTE
"Saw pt briefly in CRU due to symptoms.      Roula reports she has been \"going downhill\" for several weeks/ months prior to study enrollment.  She is unable to take in any meaningful calories- 1/2 piece of buttered bread yesterday and 5 pieces of sushi the day before.  She reports regular vomiting, multiple times/day due to smells, thoughts of food, movements, sometimes abruptly vomits without nausea.     Taking reglan and zofran as much as possible but only offering temporary relief and not successful in allowing her to take in more food.      She has no problem getting in at least 64 oz of water per day.  Cannot tolerate protein shakes.      Encouraged her to try all the high-calorie and high-fat foods she can, including ice cream and full-fat dairy.  Encouraged her to try ativan as nausea medication, instructed her not to drive until she knows how the drug will affect her.      She states she is using PO dilaudid BID for abdominal pain; encouraged her to try Tylenol instead if the pain is not severe, as dilaudid can cause nausea and constipation.      Dr. Figueroa did not want to order additional anti-emetics or electrolyte replacement.  Pt is OK trying PO ativan and trying ice cream.  She will seek care in the ED if her symptoms get worse or if she cannot keep down water.   "

## 2025-07-03 NOTE — PROGRESS NOTES
Clinical Research Unit Nursing Note:  Roula Darling presents today for Cycle 1 Day 4, Study- TORL 1-23 (IDS #5968).     Note:   Day 4 of Cycle 1. Patient reports two episodes of emesis yesterday after trying to eat breakfast (1/2 slice a bread and 1 strawberry) and dinner even after trying zofran and reglan. Was only able to eat a few nibbles of bread. Able to tolerate water (up to 8 cups yesterday). Unable to tolerate other types of fluids other than water due to smells. States only small bowel movement yesterday. Notified CRC and Dr. Figueroa. [facility] ordered and home ativan ordered to try.    Patient Vitals for the past 24 hrs:   BP Temp Temp src Pulse Resp SpO2 Weight   07/03/25 1001 102/77 98.2  F (36.8  C) Temporal 109 16 98 % 52.1 kg (114 lb 13.8 oz)      Vitals, labs, and EKG obtained per protocol.     Recent Labs   Lab Test 07/03/25  1023 06/30/25  0738   * 132*   POTASSIUM 3.0* 3.3*   CHLORIDE 94* 93*   CO2 23 18*   ANIONGAP 16* 21*   GLC 93 75   BUN 3.1* 4.3*   CR 0.44* 0.45*   EBONIE 8.8 9.0      Notified Dr. Figueroa of [facility] labs - did not want further treatment or potassium replacement, hopes oral intake will improve. Patient encouraged to try ativan and potassium rich foods. Advised that if continuing to not be able to intake food or gets worse to be seen.     Discharge Plan:   CRC plans to follow-up with patient. See above.  Departure Mode: Ambulatory.     Shanika Ramírez, RN on 7/3/2025 at 2:17 PM

## 2025-07-07 ENCOUNTER — HOSPITAL ENCOUNTER (OUTPATIENT)
Dept: RESEARCH | Facility: CLINIC | Age: 50
Discharge: HOME | End: 2025-07-07
Attending: OBSTETRICS & GYNECOLOGY | Admitting: OBSTETRICS & GYNECOLOGY
Payer: COMMERCIAL

## 2025-07-07 ENCOUNTER — ALLIED HEALTH/NURSE VISIT (OUTPATIENT)
Dept: ONCOLOGY | Facility: CLINIC | Age: 50
End: 2025-07-07

## 2025-07-07 VITALS
TEMPERATURE: 98.8 F | SYSTOLIC BLOOD PRESSURE: 107 MMHG | DIASTOLIC BLOOD PRESSURE: 77 MMHG | BODY MASS INDEX: 18.99 KG/M2 | HEART RATE: 98 BPM | OXYGEN SATURATION: 100 % | WEIGHT: 113.32 LBS | RESPIRATION RATE: 14 BRPM

## 2025-07-07 DIAGNOSIS — Z00.6 CLINICAL TRIAL PARTICIPANT: Primary | ICD-10-CM

## 2025-07-07 DIAGNOSIS — Z00.6 EXAMINATION OF PARTICIPANT OR CONTROL IN CLINICAL RESEARCH: Primary | ICD-10-CM

## 2025-07-07 DIAGNOSIS — C20 RECTAL CANCER (H): ICD-10-CM

## 2025-07-07 LAB
ALBUMIN SERPL BCG-MCNC: 3.4 G/DL (ref 3.5–5.2)
ALBUMIN UR-MCNC: 10 MG/DL
ALP SERPL-CCNC: 182 U/L (ref 40–150)
ALT SERPL W P-5'-P-CCNC: 15 U/L (ref 0–50)
AMYLASE SERPL-CCNC: 34 U/L (ref 28–100)
ANION GAP SERPL CALCULATED.3IONS-SCNC: 14 MMOL/L (ref 7–15)
APPEARANCE UR: CLEAR
AST SERPL W P-5'-P-CCNC: 57 U/L (ref 0–45)
BASOPHILS # BLD AUTO: 0 10E3/UL (ref 0–0.2)
BASOPHILS NFR BLD AUTO: 0 %
BILIRUB SERPL-MCNC: 0.4 MG/DL
BILIRUB UR QL STRIP: NEGATIVE
BUN SERPL-MCNC: 4.7 MG/DL (ref 6–20)
CALCIUM SERPL-MCNC: 9 MG/DL (ref 8.8–10.4)
CHLORIDE SERPL-SCNC: 95 MMOL/L (ref 98–107)
COLOR UR AUTO: YELLOW
CREAT SERPL-MCNC: 0.47 MG/DL (ref 0.51–0.95)
EGFRCR SERPLBLD CKD-EPI 2021: >90 ML/MIN/1.73M2
EOSINOPHIL # BLD AUTO: 0.3 10E3/UL (ref 0–0.7)
EOSINOPHIL NFR BLD AUTO: 5 %
ERYTHROCYTE [DISTWIDTH] IN BLOOD BY AUTOMATED COUNT: 17.9 % (ref 10–15)
GLUCOSE SERPL-MCNC: 98 MG/DL (ref 70–99)
GLUCOSE UR STRIP-MCNC: NEGATIVE MG/DL
HCO3 SERPL-SCNC: 24 MMOL/L (ref 22–29)
HCT VFR BLD AUTO: 29.8 % (ref 35–47)
HGB BLD-MCNC: 9.5 G/DL (ref 11.7–15.7)
HGB UR QL STRIP: NEGATIVE
IMM GRANULOCYTES # BLD: 0 10E3/UL
IMM GRANULOCYTES NFR BLD: 0 %
INR PPP: 1.19 (ref 0.85–1.15)
KETONES UR STRIP-MCNC: ABNORMAL MG/DL
LDH SERPL L TO P-CCNC: 1220 U/L (ref 0–250)
LEUKOCYTE ESTERASE UR QL STRIP: NEGATIVE
LIPASE SERPL-CCNC: 22 U/L (ref 13–60)
LYMPHOCYTES # BLD AUTO: 0.5 10E3/UL (ref 0.8–5.3)
LYMPHOCYTES NFR BLD AUTO: 7 %
MAGNESIUM SERPL-MCNC: 2.2 MG/DL (ref 1.7–2.3)
MCH RBC QN AUTO: 24.1 PG (ref 26.5–33)
MCHC RBC AUTO-ENTMCNC: 31.9 G/DL (ref 31.5–36.5)
MCV RBC AUTO: 76 FL (ref 78–100)
MONOCYTES # BLD AUTO: 0.7 10E3/UL (ref 0–1.3)
MONOCYTES NFR BLD AUTO: 10 %
NEUTROPHILS # BLD AUTO: 5.4 10E3/UL (ref 1.6–8.3)
NEUTROPHILS NFR BLD AUTO: 78 %
NITRATE UR QL: NEGATIVE
NRBC # BLD AUTO: 0 10E3/UL
NRBC BLD AUTO-RTO: 0 /100
PH UR STRIP: 6 [PH] (ref 5–7)
PHOSPHATE SERPL-MCNC: 4.1 MG/DL (ref 2.5–4.5)
PLATELET # BLD AUTO: 384 10E3/UL (ref 150–450)
POTASSIUM SERPL-SCNC: 3.7 MMOL/L (ref 3.4–5.3)
PROT SERPL-MCNC: 7.2 G/DL (ref 6.4–8.3)
PROTHROMBIN TIME: 15.4 SECONDS (ref 11.8–14.8)
RBC # BLD AUTO: 3.94 10E6/UL (ref 3.8–5.2)
RBC URINE: <1 /HPF
SODIUM SERPL-SCNC: 133 MMOL/L (ref 135–145)
SP GR UR STRIP: 1.01 (ref 1–1.03)
SQUAMOUS EPITHELIAL: <1 /HPF
UROBILINOGEN UR STRIP-MCNC: NORMAL MG/DL
WBC # BLD AUTO: 6.9 10E3/UL (ref 4–11)
WBC URINE: 8 /HPF

## 2025-07-07 PROCEDURE — 250N000011 HC RX IP 250 OP 636: Performed by: INTERNAL MEDICINE

## 2025-07-07 PROCEDURE — 83615 LACTATE (LD) (LDH) ENZYME: CPT | Performed by: INTERNAL MEDICINE

## 2025-07-07 PROCEDURE — 82150 ASSAY OF AMYLASE: CPT | Performed by: INTERNAL MEDICINE

## 2025-07-07 PROCEDURE — 258N000003 HC RX IP 258 OP 636: Performed by: INTERNAL MEDICINE

## 2025-07-07 PROCEDURE — 36591 DRAW BLOOD OFF VENOUS DEVICE: CPT

## 2025-07-07 PROCEDURE — 300N000007 HC RESEARCH B&I SPECIMEN PROCESSING, SIMPLE

## 2025-07-07 PROCEDURE — 36415 COLL VENOUS BLD VENIPUNCTURE: CPT | Performed by: INTERNAL MEDICINE

## 2025-07-07 PROCEDURE — 85610 PROTHROMBIN TIME: CPT | Performed by: INTERNAL MEDICINE

## 2025-07-07 PROCEDURE — 84100 ASSAY OF PHOSPHORUS: CPT | Performed by: INTERNAL MEDICINE

## 2025-07-07 PROCEDURE — 82247 BILIRUBIN TOTAL: CPT | Performed by: INTERNAL MEDICINE

## 2025-07-07 PROCEDURE — 85025 COMPLETE CBC W/AUTO DIFF WBC: CPT | Performed by: INTERNAL MEDICINE

## 2025-07-07 PROCEDURE — 83735 ASSAY OF MAGNESIUM: CPT | Performed by: INTERNAL MEDICINE

## 2025-07-07 PROCEDURE — 83690 ASSAY OF LIPASE: CPT | Performed by: INTERNAL MEDICINE

## 2025-07-07 PROCEDURE — 81001 URINALYSIS AUTO W/SCOPE: CPT | Performed by: INTERNAL MEDICINE

## 2025-07-07 RX ORDER — HEPARIN SODIUM (PORCINE) LOCK FLUSH IV SOLN 100 UNIT/ML 100 UNIT/ML
5-10 SOLUTION INTRAVENOUS
Status: DISCONTINUED | OUTPATIENT
Start: 2025-07-07 | End: 2025-07-08 | Stop reason: HOSPADM

## 2025-07-07 RX ORDER — OLANZAPINE 2.5 MG/1
5 TABLET, FILM COATED ORAL AT BEDTIME
Qty: 30 TABLET | Refills: 0 | Status: SHIPPED | OUTPATIENT
Start: 2025-07-07

## 2025-07-07 RX ADMIN — Medication 5 ML: at 12:22

## 2025-07-07 RX ADMIN — SODIUM CHLORIDE 1000 ML: 0.9 INJECTION, SOLUTION INTRAVENOUS at 11:19

## 2025-07-07 NOTE — PROGRESS NOTES
[facility]      PATIENT NAME: Roula Darling MRN # [id number]  DATE OF VISIT: July 7, 2025 YOB: 1975    Study: TORL 1-23  Date of Consent: 06/10/25  C1D1: 06/30/25     Cancer Type: Rectal Cancer  Primary Oncologist: Kiki Hernandez     Reason for Visit: C1D1 of treatment     Interval History  Pt tolerated her infusion last week fine.  She did continue to have nausea this past week.  Was taking lorazepam for nausea and felt very sleepy and off balance on it.  She did have emesis on Friday x 2.  She took Lorazepam on Friday, Sat and Sun am - but felt so out of it, that she did not take any more after that.  She also had 3 episodes of diarrhea on Sunday.  She has been able to eat and feels that she is doing a bit better with that.  This morning she was able to eat some buttered toast and keep it down.  She does feel that nausea is improved today.    She had been drinking 64 oz of fluids daily - however, she thinks her family has been refilling her water bottle before it was empty so it has been difficult to know how much fluid intake she has been getting.  Functionally, she has not been doing very much - mostly just moving from chair to bed.  Since she was taking the lorazepam she was sleeping most of the last few days.       Cancer History  (2/15/2023 - 2/21/2023) Palliative radiation, 2500 cGy to pelvis in 5 fractions.  (2/22/2023 - 05/01/2023.) FOLFOX chemotherapy every 2 weeks.  05/01/2023: Oxaliplatin reaction needing EpiPen. Oxaliplatin desensitization failed 05/08/2023. Hence patient received 5 FU only that treatment  05/22/2023- 1/16/24: FOLFIRI with growth factor support every 2 weeks. Progressive disease in the lungs  2/6/24-4/2/24: Lonsurf with bevacizumab  04/20/2024-5/20/24: Regorafenib  6/4/24- 07/30/2024. : clinical trial XTX-301  08/05/2024-02/05/2025: Oxaliplatin desensitization plan for FOLFOX   02/24/2025- 03/10/2025:5 FU alone  04/01/2025:Fruquintinib      Molecular/NGS  [facility] completed 03/13/2023. K-jerilyn G12V mutated.    Other mutations:  KRAS G12 V  NRAS wild type  [id number]  FBXW7  PTEN  SMAD4  Tp53     Microsatellite stable  TMB: 4 mut/MB     BRAF negative     Others: Her2 IHC1+     Pre-screening for TORL1, 23 - Claudin 6 negative (Reported on 3/19/25)  Past Medical History  Past Medical History:   Diagnosis Date    Asthma     High school;  now resolved    Colorectal cancer (H) 2023       Current Medications  Current Outpatient Medications   Medication Sig Dispense Refill    benzonatate (TESSALON) 100 MG capsule Take 100 mg by mouth.      HYDROmorphone (DILAUDID) 2 MG tablet Take 2-4 mg by mouth.      ibuprofen (ADVIL/MOTRIN) 200 MG capsule       lidocaine-prilocaine (EMLA) 2.5-2.5 % external cream APPLY TO PORT-A-CATH SITE ONE HOUR PRIOR TO NEEDLE ACCESS.      LORazepam (ATIVAN) 0.5 MG tablet Take 2 tablets (1 mg) by mouth every 6 hours as needed for nausea or vomiting. 30 tablet 0    ondansetron (ZOFRAN ODT) 8 MG ODT tab Take 1 tablet (8 mg) by mouth every 8 hours as needed for nausea. 60 tablet 3    ondansetron (ZOFRAN) 8 MG tablet Take 8 mg by mouth.      polyethylene glycol (MIRALAX) 17 g packet Take 17 g by mouth daily as needed.      prochlorperazine (COMPAZINE) 10 MG tablet Take 1 tablet by mouth every 6 hours as needed.      senna (SENOKOT) 8.6 MG tablet Take 2 tablets by mouth.          Review of Systems: As above in the HPI, o/w complete 12-point ROS was negative.    Physical Exam  BP (!) 87/72 (BP Location: Left arm, Patient Position: Sitting, Cuff Size: Adult Regular)   Pulse (!) 133   Temp 98.8  F (37.1  C) (Temporal)   Resp 14   Wt 51.4 kg (113 lb 5.1 oz)   SpO2 99%   BMI 18.99 kg/m       GEN: NAD, chronically ill   HEENT: EOMI, no icterus, injection or pallor. Oropharynx clear  NECK: no cervical or supraclavicular lymphadenopathy  LUNGS: clear bilaterally  CV: regular, no murmurs, rubs, or gallops  ABDOMEN: soft, non-tender,  non-distended, normal bowel sounds  EXT: warm, well perfused, no edema  NEURO: alert  SKIN: no rashes    Laboratory and Imaging Studies      Labs were independently reviewed and interpreted by me    CBC was ok - WBC 6.9 Hbg 9.5 ANC    AST 57 Alk phos 182    Creatinine stable 0.47  K and Mag WNL    ECG today showed sinus tachycardia - otherwise unremarkable.     CT Research Reading  [facility] [employer] REPORT    CRITERIA: RECIST 1.1  CURRENT IMAGE STUDY DATE: 2025-06-20  READ: Baseline  CONTRACT#: [id number]  EMR REPORT ACC#: [id number]    READER: Mirella Sorensen  PATIENT: KAYE MORENO; MRN# [id number]; [birth year] ; 50y;    CLINICAL HISTORY NOTES:  NONE.    TIMEPOINT: 1    FINDINGS:    TARGET LESION(S):  Target 1 is a mass located in the NAINA Solid Nodule .  -  6/20/25: 4.36 x 3.22 cm (Series 3, Slice 94)  Target 2 is a mass located in the LLL Solid Nodule .  -  6/20/25: 5.42 x 4.65 cm (Series 3, Slice 130)  Target 3 is a mass located in the Right Adrenal .  -  6/20/25: 1.90 x 1.56 cm (Series 3, Slice 225)  Target 4 is a mass located in the Liver Right Lobe .  -  6/20/25: 7.80 x 6.97 cm (Series 3, Slice 290)    NON-TARGET FINDINGS:  -  Node - Mediastinal (Multiple 6-20); Status: Pathologic (Series 3, Slice 77)  -  Bilateral Lungs (Multiple 6-20) - solid masses and nodules; Status: Present (Series 3, Slice 92)  -  Node - Bilateral Hilar (Few 2-5); Status: Pathologic (Series 3, Slice 145)  -  Node - Periportal (Few 2-5); Status: Pathologic (Series 3, Slice 278)  -  Node - Retroperit (Multiple 6-20); Status: Pathologic (Series 3, Slice 339)  -  Bowel (Single) - rectal thickening; Status: Present (Series 3, Slice 528)    OTHER FINDINGS:  None.    NEW SITES OF DISEASE:  None.    OTHER IMAGING NOTES:  -  None.    RESPONSE: N/A  Current Axis Sum: 19.48 cm  Change From Prior: N/A  Change From Lowest: Lowest  Change From Baseline: Baseline    IMPRESSION: Large burden metastatic disease from rectal carcinoma.  End of Report       Imaging  was personally reviewed and interpreted by me      Assessment/Plan  Ms. Roula Darling is a 50 year old female with metastatic rectal cancer who returns to clinic for TORL1,23 C1D8 visit. .    ECOG PS: 1    She will get PK and ECG today.  Labs reviewed by me and are ...    She will return next week for C1D15 vist.        #Headache  Chronic headache of unclear etiology. Not clearly related to treatment or disease. Not an issue today. We will continue to monitor     #Nausea; Vomiting; Constipation  She has been having challenges with PO intake due to chronic nausea, with intermittent vomiting. This required presentation to ER last weekend, where she was additionally given Reglan, which has improved her symptoms. We will prescribe sublingual Zofran for further benefit and continue to monitor.  I have added zyprexa 5mg at bedtime for her. Hopefully this will mitigate more nausea. Can continue    Dehydration - Hypotensive and tachycardic today.  Will give her 1L of NS today.      Diarrhea - She had 3 episodes of diarrhea yesterday. None so far today.  Likely related to TORL1,23.   Grade 1 - can use imodium if this is ongoing, but suspect it will be transient.      New Adverse Events  Diarrhea - grade 1 - likely related to torl1,23    Dehydration - grade 1 - possibly related to torl1,23    55 minutes spent on the date of the encounter doing chart review, review of test results, interpretation of tests, patient visit, and documentation and protocol review.

## 2025-07-07 NOTE — PROGRESS NOTES
Clinical Research Unit Nursing Note:  Roula Darling presents today for Cycle 1 Day 8, Study- TORL 1-23 (IDS #[id number]).     Patient seen by provider today: Yes: Grant Lora MD                             present during visit today: Not Applicable.     Note:   Day 8 of Cycle 1.    Intravenous Access:  Patient arrived at CRU with port accessed from lab visit this morning. Port used for SOC labs and postdose research labs.       Vitals and EKG obtained per protocol. Qtc WNL.     Patient initially hypotensive and tachycardic. MD ordered 1 liter IV fluids. Repeat vitals WDL.     Patient Vitals for the past 24 hrs:   BP Temp Temp src Pulse Resp SpO2 Weight   07/07/25 1221 107/77 -- -- 98 -- 100 % --   07/07/25 1015 (!) 87/72 98.8  F (37.1  C) Temporal (!) 133 14 99 % 51.4 kg (113 lb 5.1 oz)     Access discontinued per protocol.      Discharge Plan:   Patient discharged in stable condition accompanied by: self.  Departure Mode: Ambulatory.     Palma Max RN on 7/7/2025 at 3:29 PM

## 2025-07-07 NOTE — NURSING NOTE
TORL CLINICAL TRIAL VISIT NOTE    A Phase 1, First in Human, Dose-Escalation Study of TORL-1-23 in Participants with Advanced Cancer    [facility]#: [id number]  IDS#: [id number]  Subject Name: Roula Darling  Subject ID: [id number]    2D1: Claudin 6 Negative/Low Advanced Solid Cancer: 2.4 mg/kg    07/07/25 Cycle 1 Day 8  Did the study visit occur within the appropriate window allowed by the protocol? Yes     Wt Readings from Last 3 Encounters:   07/07/25 51.4 kg (113 lb 5.1 oz)   07/03/25 52.1 kg (114 lb 13.8 oz)   07/01/25 53.2 kg (117 lb 4.6 oz)       The investigational product will be administered at mg/kg doses based on the participant s actual body weight at baseline. The dose must be adjusted if the participant s weight changes by >=10% from their baseline weight.       Adverse Event Assessment:  An AE can be any unfavorable and unintended sign (including an abnormal laboratory finding), symptom, or disease temporally associated with the use of an investigational product, whether or not related to the investigational medicinal product. If a CTCAE criterion does not exist, the Investigator should assess the severity according to the criteria in Table 9 of the protocol.    All AEs from the time the participant signs the informed consent and until 28 days after the last dose of study drug must be recorded.    Any FARRUKH that occurs 28 days after the last dose of study drug and is assessed as possibly related to study drug must be reported.   I have personally interviewed Roula Darling and reviewed her medical record for adverse events and these have been recorded on the corresponding logs in Roula Darling's research file.  New diarrhea grade 1 starting Sunday 7/6.  Ongoing intermittent vomiting but PO Intake has improved over the weekend.        Concomitant Medications:  I have personally reviewed concomitant medications and these have been recorded on the corresponding logs in the research file.  Lorazepam 1mg made her  woozy; encouraged her to try 1/4 or 1/2 tablet if needed in the future.  Starting olanzapine at HS.  1L NS bolus today.     Lab Results:  Labs were reviewed- any significant lab values were addressed and reviewed.   grade 2 anemia.    An abnormal laboratory test finding that meets any one of the following criteria should be considered an AE:   Test result is associated with accompanying symptoms   Test result requires additional diagnostic testing or medical/surgical intervention   Test result leads to a change in study treatment dosing (eg, dose modification, interruption, or permanent discontinuation) or concomitant drug treatment (eg, addition, interruption, or discontinuation) or any other change in a concomitant medication or therapy   Test result leads to any of the outcomes included in the definition of an FARRUKH (Note: this would be reported as an FARRUKH)   Test result is considered an AE by the Investigator  Laboratory results that fall outside the reference range and do not meet one of the criteria above should not be reported as AEs. Repeating an abnormal test, in the absence of the above conditions, does not constitute an AE.         Research Staff Impression / Summary:     Special considerations and instructions for today's visit were reviewed with staff including: doing much better than last Friday.  Improved PO intake.     RTC next monday for c1d15  Roula Darling was given the opportunity to ask any trial related questions. Roula Darling was seen by Dr. Lora in the CRU.  The patient knows to call with any new or worsening symptoms or concerns. Please see provider progress note for full physical exam and other clinical information.

## 2025-07-07 NOTE — NURSING NOTE
TORL CLINICAL TRIAL VISIT NOTE    A Phase 1, First in Human, Dose-Escalation Study of TORL-1-23 in Participants with Advanced Cancer    [facility]#: [id number]  IDS#: [id number]  Subject Name: Roula Darling  Subject ID: [id number]    2D1: Claudin 6 Negative/Low Advanced Solid Cancer: 2.4 mg/kg    07/07/25 Cycle 1 Day 8  Did the study visit occur within the appropriate window allowed by the protocol? Yes     Wt Readings from Last 3 Encounters:   07/07/25 51.4 kg (113 lb 5.1 oz)   07/03/25 52.1 kg (114 lb 13.8 oz)   07/01/25 53.2 kg (117 lb 4.6 oz)     Baseline Weight: ***  % Weight Change from Baseline: ***  The investigational product will be administered at mg/kg doses based on the participant s actual body weight at baseline. The dose must be adjusted if the participant s weight changes by >=10% from their baseline weight.       Adverse Event Assessment:  An AE can be any unfavorable and unintended sign (including an abnormal laboratory finding), symptom, or disease temporally associated with the use of an investigational product, whether or not related to the investigational medicinal product. If a CTCAE criterion does not exist, the Investigator should assess the severity according to the criteria in Table 9 of the protocol.    All AEs from the time the participant signs the informed consent and until 28 days after the last dose of study drug must be recorded.    Any FARRUKH that occurs 28 days after the last dose of study drug and is assessed as possibly related to study drug must be reported.   I have personally interviewed Roula Darling and reviewed her medical record for adverse events and these have been recorded on the corresponding logs in Roula Darling's research file.      Concomitant Medications:  I have personally reviewed concomitant medications and these have been recorded on the corresponding logs in the research file.     Lab Results:  Labs were reviewed- any significant lab values were addressed and  "reviewed.     An abnormal laboratory test finding that meets any one of the following criteria should be considered an AE:   Test result is associated with accompanying symptoms   Test result requires additional diagnostic testing or medical/surgical intervention   Test result leads to a change in study treatment dosing (eg, dose modification, interruption, or permanent discontinuation) or concomitant drug treatment (eg, addition, interruption, or discontinuation) or any other change in a concomitant medication or therapy   Test result leads to any of the outcomes included in the definition of an FARRUKH (Note: this would be reported as an FARRUKH)   Test result is considered an AE by the Investigator  Laboratory results that fall outside the reference range and do not meet one of the criteria above should not be reported as AEs. Repeating an abnormal test, in the absence of the above conditions, does not constitute an AE.     ECOG Performance Status (ECOG-PS): {ECOG PS 4-:596428}    Disease assessments / RECIST:  The { :164586::\"CT scan\",\"MRI scan\"} from this cycle was reviewed by Dr. Duran. Measurements will be documented in a separate RECIST document. ***    Medication/IDS Note:  Drug Name: TORL-1-23  IDS#: [id number]  Lot # ***    TORL infusion stop time: ***  {frozenorliquid:158965}      Research Staff Impression / Summary:     Special considerations and instructions for today's visit were reviewed with staff including: {Researchstudyinterventionconsiderations:876342}     {TORLschedule:211882}  Roula Darling was given the opportunity to ask any trial related questions. Roula Darling was seen by {JOSE CARLOSroviders:970201} in the CRU.  The patient knows to call with any new or worsening symptoms or concerns. Please see provider progress note for full physical exam and other clinical information.         "

## 2025-07-08 NOTE — ADDENDUM NOTE
Encounter addended by: Rosaura Castellon on: 7/8/2025 6:31 AM   Actions taken: Charge Capture section accepted

## 2025-07-14 ENCOUNTER — HOSPITAL ENCOUNTER (OUTPATIENT)
Dept: RESEARCH | Facility: CLINIC | Age: 50
Discharge: HOME | End: 2025-07-14
Attending: OBSTETRICS & GYNECOLOGY
Payer: COMMERCIAL

## 2025-07-14 ENCOUNTER — HOSPITAL ENCOUNTER (OUTPATIENT)
Dept: GENERAL RADIOLOGY | Facility: CLINIC | Age: 50
Discharge: HOME | End: 2025-07-14
Attending: INTERNAL MEDICINE
Payer: COMMERCIAL

## 2025-07-14 ENCOUNTER — HOSPITAL ENCOUNTER (INPATIENT)
Facility: CLINIC | Age: 50
End: 2025-07-14
Attending: EMERGENCY MEDICINE | Admitting: STUDENT IN AN ORGANIZED HEALTH CARE EDUCATION/TRAINING PROGRAM
Payer: COMMERCIAL

## 2025-07-14 VITALS
SYSTOLIC BLOOD PRESSURE: 106 MMHG | HEART RATE: 115 BPM | TEMPERATURE: 98.8 F | DIASTOLIC BLOOD PRESSURE: 70 MMHG | RESPIRATION RATE: 26 BRPM | OXYGEN SATURATION: 98 % | BODY MASS INDEX: 18.18 KG/M2 | WEIGHT: 108.47 LBS

## 2025-07-14 VITALS — DIASTOLIC BLOOD PRESSURE: 75 MMHG | HEART RATE: 108 BPM | OXYGEN SATURATION: 98 % | SYSTOLIC BLOOD PRESSURE: 108 MMHG

## 2025-07-14 DIAGNOSIS — Z00.6 EXAMINATION OF PARTICIPANT OR CONTROL IN CLINICAL RESEARCH: ICD-10-CM

## 2025-07-14 DIAGNOSIS — C20 RECTAL CANCER (H): ICD-10-CM

## 2025-07-14 DIAGNOSIS — R10.13 ABDOMINAL PAIN, EPIGASTRIC: ICD-10-CM

## 2025-07-14 DIAGNOSIS — T73.0XXA STARVATION KETOACIDOSIS: ICD-10-CM

## 2025-07-14 DIAGNOSIS — D50.9 MICROCYTIC ANEMIA: ICD-10-CM

## 2025-07-14 DIAGNOSIS — Z00.6 EXAMINATION OF PARTICIPANT OR CONTROL IN CLINICAL RESEARCH: Primary | ICD-10-CM

## 2025-07-14 DIAGNOSIS — E87.1 HYPONATREMIA: ICD-10-CM

## 2025-07-14 DIAGNOSIS — Z83.2: ICD-10-CM

## 2025-07-14 DIAGNOSIS — C20 RECTAL CANCER (H): Primary | ICD-10-CM

## 2025-07-14 DIAGNOSIS — E87.29 STARVATION KETOACIDOSIS: ICD-10-CM

## 2025-07-14 LAB
ALBUMIN SERPL BCG-MCNC: 3.7 G/DL (ref 3.5–5.2)
ALBUMIN UR-MCNC: 20 MG/DL
ALP SERPL-CCNC: 174 U/L (ref 40–150)
ALT SERPL W P-5'-P-CCNC: 12 U/L (ref 0–50)
AMYLASE SERPL-CCNC: 42 U/L (ref 28–100)
ANION GAP SERPL CALCULATED.3IONS-SCNC: 17 MMOL/L (ref 7–15)
ANION GAP SERPL CALCULATED.3IONS-SCNC: 21 MMOL/L (ref 7–15)
APPEARANCE UR: CLEAR
AST SERPL W P-5'-P-CCNC: 57 U/L (ref 0–45)
B-OH-BUTYR SERPL-SCNC: 3.46 MMOL/L
BASOPHILS # BLD AUTO: 0 10E3/UL (ref 0–0.2)
BASOPHILS NFR BLD AUTO: 1 %
BILIRUB SERPL-MCNC: 0.5 MG/DL
BILIRUB UR QL STRIP: NEGATIVE
BUN SERPL-MCNC: 5.2 MG/DL (ref 6–20)
BUN SERPL-MCNC: 6.4 MG/DL (ref 6–20)
CALCIUM SERPL-MCNC: 8.3 MG/DL (ref 8.8–10.4)
CALCIUM SERPL-MCNC: 9.3 MG/DL (ref 8.8–10.4)
CEA SERPL-MCNC: 19.9 NG/ML
CHLORIDE SERPL-SCNC: 89 MMOL/L (ref 98–107)
CHLORIDE SERPL-SCNC: 95 MMOL/L (ref 98–107)
COLOR UR AUTO: ABNORMAL
CREAT SERPL-MCNC: 0.41 MG/DL (ref 0.51–0.95)
CREAT SERPL-MCNC: 0.45 MG/DL (ref 0.51–0.95)
EGFRCR SERPLBLD CKD-EPI 2021: >90 ML/MIN/1.73M2
EGFRCR SERPLBLD CKD-EPI 2021: >90 ML/MIN/1.73M2
EOSINOPHIL # BLD AUTO: 0 10E3/UL (ref 0–0.7)
EOSINOPHIL NFR BLD AUTO: 0 %
ERYTHROCYTE [DISTWIDTH] IN BLOOD BY AUTOMATED COUNT: 18.6 % (ref 10–15)
FERRITIN SERPL-MCNC: 598 NG/ML (ref 6–175)
GLUCOSE SERPL-MCNC: 80 MG/DL (ref 70–99)
GLUCOSE SERPL-MCNC: 84 MG/DL (ref 70–99)
GLUCOSE UR STRIP-MCNC: NEGATIVE MG/DL
HCO3 SERPL-SCNC: 19 MMOL/L (ref 22–29)
HCO3 SERPL-SCNC: 19 MMOL/L (ref 22–29)
HCT VFR BLD AUTO: 30.5 % (ref 35–47)
HGB BLD-MCNC: 9.7 G/DL (ref 11.7–15.7)
HGB UR QL STRIP: NEGATIVE
IMM GRANULOCYTES # BLD: 0.1 10E3/UL
IMM GRANULOCYTES NFR BLD: 1 %
INR PPP: 1.36 (ref 0.85–1.15)
IRON BINDING CAPACITY (ROCHE): 162 UG/DL (ref 240–430)
IRON SATN MFR SERPL: 8 % (ref 15–46)
IRON SERPL-MCNC: 13 UG/DL (ref 37–145)
KETONES UR STRIP-MCNC: 100 MG/DL
LACTATE SERPL-SCNC: 0.9 MMOL/L (ref 0.7–2)
LDH SERPL L TO P-CCNC: 1487 U/L (ref 0–250)
LEUKOCYTE ESTERASE UR QL STRIP: NEGATIVE
LIPASE SERPL-CCNC: 23 U/L (ref 13–60)
LYMPHOCYTES # BLD AUTO: 0.4 10E3/UL (ref 0.8–5.3)
LYMPHOCYTES NFR BLD AUTO: 5 %
MAGNESIUM SERPL-MCNC: 1.9 MG/DL (ref 1.7–2.3)
MCH RBC QN AUTO: 24.2 PG (ref 26.5–33)
MCHC RBC AUTO-ENTMCNC: 31.8 G/DL (ref 31.5–36.5)
MCV RBC AUTO: 76 FL (ref 78–100)
MONOCYTES # BLD AUTO: 1.1 10E3/UL (ref 0–1.3)
MONOCYTES NFR BLD AUTO: 14 %
MUCOUS THREADS #/AREA URNS LPF: PRESENT /LPF
NEUTROPHILS # BLD AUTO: 6.3 10E3/UL (ref 1.6–8.3)
NEUTROPHILS NFR BLD AUTO: 79 %
NITRATE UR QL: NEGATIVE
NRBC # BLD AUTO: 0 10E3/UL
NRBC BLD AUTO-RTO: 0 /100
OSMOLALITY SERPL: 269 MMOL/KG (ref 275–295)
OSMOLALITY UR: 441 MMOL/KG (ref 100–1200)
PH UR STRIP: 6 [PH] (ref 5–7)
PHOSPHATE SERPL-MCNC: 3.2 MG/DL (ref 2.5–4.5)
PLATELET # BLD AUTO: 506 10E3/UL (ref 150–450)
POTASSIUM SERPL-SCNC: 3.7 MMOL/L (ref 3.4–5.3)
POTASSIUM SERPL-SCNC: 3.8 MMOL/L (ref 3.4–5.3)
PROT SERPL-MCNC: 7.4 G/DL (ref 6.4–8.3)
PROTHROMBIN TIME: 17 SECONDS (ref 11.8–14.8)
RBC # BLD AUTO: 4.01 10E6/UL (ref 3.8–5.2)
RBC URINE: <1 /HPF
SODIUM SERPL-SCNC: 129 MMOL/L (ref 135–145)
SODIUM SERPL-SCNC: 131 MMOL/L (ref 135–145)
SODIUM UR-SCNC: 35 MMOL/L
SP GR UR STRIP: 1.01 (ref 1–1.03)
URATE SERPL-MCNC: 6.3 MG/DL (ref 2.4–5.7)
UROBILINOGEN UR STRIP-MCNC: NORMAL MG/DL
WBC # BLD AUTO: 8 10E3/UL (ref 4–11)
WBC URINE: 3 /HPF

## 2025-07-14 PROCEDURE — 81001 URINALYSIS AUTO W/SCOPE: CPT | Performed by: INTERNAL MEDICINE

## 2025-07-14 PROCEDURE — 84100 ASSAY OF PHOSPHORUS: CPT | Performed by: INTERNAL MEDICINE

## 2025-07-14 PROCEDURE — 99285 EMERGENCY DEPT VISIT HI MDM: CPT | Mod: FS | Performed by: EMERGENCY MEDICINE

## 2025-07-14 PROCEDURE — 510N000023 HC CRU B&I PATIENT CARE, PER 15 MIN

## 2025-07-14 PROCEDURE — 82150 ASSAY OF AMYLASE: CPT | Performed by: INTERNAL MEDICINE

## 2025-07-14 PROCEDURE — 83935 ASSAY OF URINE OSMOLALITY: CPT | Performed by: INTERNAL MEDICINE

## 2025-07-14 PROCEDURE — 74019 RADEX ABDOMEN 2 VIEWS: CPT

## 2025-07-14 PROCEDURE — 82728 ASSAY OF FERRITIN: CPT | Performed by: INTERNAL MEDICINE

## 2025-07-14 PROCEDURE — 84550 ASSAY OF BLOOD/URIC ACID: CPT | Performed by: INTERNAL MEDICINE

## 2025-07-14 PROCEDURE — 82310 ASSAY OF CALCIUM: CPT | Performed by: INTERNAL MEDICINE

## 2025-07-14 PROCEDURE — 83735 ASSAY OF MAGNESIUM: CPT | Performed by: INTERNAL MEDICINE

## 2025-07-14 PROCEDURE — 82010 KETONE BODYS QUAN: CPT | Performed by: STUDENT IN AN ORGANIZED HEALTH CARE EDUCATION/TRAINING PROGRAM

## 2025-07-14 PROCEDURE — 82947 ASSAY GLUCOSE BLOOD QUANT: CPT | Performed by: INTERNAL MEDICINE

## 2025-07-14 PROCEDURE — 83930 ASSAY OF BLOOD OSMOLALITY: CPT | Performed by: INTERNAL MEDICINE

## 2025-07-14 PROCEDURE — 99207 PR APP CREDIT; MD BILLING SHARED VISIT: CPT | Mod: FS | Performed by: STUDENT IN AN ORGANIZED HEALTH CARE EDUCATION/TRAINING PROGRAM

## 2025-07-14 PROCEDURE — 82378 CARCINOEMBRYONIC ANTIGEN: CPT | Performed by: INTERNAL MEDICINE

## 2025-07-14 PROCEDURE — 250N000011 HC RX IP 250 OP 636: Performed by: INTERNAL MEDICINE

## 2025-07-14 PROCEDURE — 36415 COLL VENOUS BLD VENIPUNCTURE: CPT | Performed by: STUDENT IN AN ORGANIZED HEALTH CARE EDUCATION/TRAINING PROGRAM

## 2025-07-14 PROCEDURE — 83690 ASSAY OF LIPASE: CPT | Performed by: INTERNAL MEDICINE

## 2025-07-14 PROCEDURE — 83550 IRON BINDING TEST: CPT | Performed by: INTERNAL MEDICINE

## 2025-07-14 PROCEDURE — 99223 1ST HOSP IP/OBS HIGH 75: CPT | Mod: FS | Performed by: PHYSICIAN ASSISTANT

## 2025-07-14 PROCEDURE — 250N000013 HC RX MED GY IP 250 OP 250 PS 637: Performed by: PHYSICIAN ASSISTANT

## 2025-07-14 PROCEDURE — 83615 LACTATE (LD) (LDH) ENZYME: CPT | Performed by: INTERNAL MEDICINE

## 2025-07-14 PROCEDURE — 99212 OFFICE O/P EST SF 10 MIN: CPT | Mod: 25

## 2025-07-14 PROCEDURE — 83605 ASSAY OF LACTIC ACID: CPT | Performed by: STUDENT IN AN ORGANIZED HEALTH CARE EDUCATION/TRAINING PROGRAM

## 2025-07-14 PROCEDURE — 96361 HYDRATE IV INFUSION ADD-ON: CPT

## 2025-07-14 PROCEDURE — 99285 EMERGENCY DEPT VISIT HI MDM: CPT | Performed by: EMERGENCY MEDICINE

## 2025-07-14 PROCEDURE — 258N000003 HC RX IP 258 OP 636: Performed by: INTERNAL MEDICINE

## 2025-07-14 PROCEDURE — 85004 AUTOMATED DIFF WBC COUNT: CPT | Performed by: INTERNAL MEDICINE

## 2025-07-14 PROCEDURE — 96374 THER/PROPH/DIAG INJ IV PUSH: CPT

## 2025-07-14 PROCEDURE — 85610 PROTHROMBIN TIME: CPT | Performed by: INTERNAL MEDICINE

## 2025-07-14 PROCEDURE — 84300 ASSAY OF URINE SODIUM: CPT | Performed by: INTERNAL MEDICINE

## 2025-07-14 PROCEDURE — G0463 HOSPITAL OUTPT CLINIC VISIT: HCPCS

## 2025-07-14 PROCEDURE — 120N000002 HC R&B MED SURG/OB UMMC

## 2025-07-14 PROCEDURE — 36415 COLL VENOUS BLD VENIPUNCTURE: CPT | Performed by: INTERNAL MEDICINE

## 2025-07-14 PROCEDURE — 300N000007 HC RESEARCH B&I SPECIMEN PROCESSING, SIMPLE

## 2025-07-14 PROCEDURE — 250N000011 HC RX IP 250 OP 636: Performed by: PHYSICIAN ASSISTANT

## 2025-07-14 PROCEDURE — 250N000013 HC RX MED GY IP 250 OP 250 PS 637: Performed by: STUDENT IN AN ORGANIZED HEALTH CARE EDUCATION/TRAINING PROGRAM

## 2025-07-14 PROCEDURE — 96375 TX/PRO/DX INJ NEW DRUG ADDON: CPT

## 2025-07-14 RX ORDER — SENNOSIDES 8.6 MG/1
2 TABLET ORAL
Status: DISCONTINUED | OUTPATIENT
Start: 2025-07-14 | End: 2025-07-14

## 2025-07-14 RX ORDER — CALCIUM CARBONATE 500 MG/1
1000 TABLET, CHEWABLE ORAL 4 TIMES DAILY PRN
Status: DISCONTINUED | OUTPATIENT
Start: 2025-07-14 | End: 2025-07-15

## 2025-07-14 RX ORDER — HEPARIN SODIUM (PORCINE) LOCK FLUSH IV SOLN 100 UNIT/ML 100 UNIT/ML
5-10 SOLUTION INTRAVENOUS
Status: DISCONTINUED | OUTPATIENT
Start: 2025-07-14 | End: 2025-07-15 | Stop reason: HOSPADM

## 2025-07-14 RX ORDER — HEPARIN SODIUM,PORCINE 10 UNIT/ML
5-10 VIAL (ML) INTRAVENOUS
Status: DISCONTINUED | OUTPATIENT
Start: 2025-07-14 | End: 2025-07-15 | Stop reason: HOSPADM

## 2025-07-14 RX ORDER — NALOXONE HYDROCHLORIDE 0.4 MG/ML
0.2 INJECTION, SOLUTION INTRAMUSCULAR; INTRAVENOUS; SUBCUTANEOUS
Status: DISCONTINUED | OUTPATIENT
Start: 2025-07-14 | End: 2025-07-18 | Stop reason: HOSPADM

## 2025-07-14 RX ORDER — HYDROMORPHONE HYDROCHLORIDE 2 MG/1
2 TABLET ORAL EVERY 4 HOURS PRN
Refills: 0 | Status: DISCONTINUED | OUTPATIENT
Start: 2025-07-14 | End: 2025-07-15

## 2025-07-14 RX ORDER — OLANZAPINE 5 MG/1
5 TABLET, FILM COATED ORAL AT BEDTIME
Status: DISCONTINUED | OUTPATIENT
Start: 2025-07-14 | End: 2025-07-15

## 2025-07-14 RX ORDER — BENZONATATE 100 MG/1
100 CAPSULE ORAL 3 TIMES DAILY PRN
Status: DISCONTINUED | OUTPATIENT
Start: 2025-07-14 | End: 2025-07-18 | Stop reason: HOSPADM

## 2025-07-14 RX ORDER — FOLIC ACID 5 MG/ML
1 INJECTION, SOLUTION INTRAMUSCULAR; INTRAVENOUS; SUBCUTANEOUS ONCE
Status: DISCONTINUED | OUTPATIENT
Start: 2025-07-14 | End: 2025-07-14

## 2025-07-14 RX ORDER — PROCHLORPERAZINE MALEATE 5 MG/1
10 TABLET ORAL EVERY 6 HOURS PRN
Status: DISCONTINUED | OUTPATIENT
Start: 2025-07-14 | End: 2025-07-15

## 2025-07-14 RX ORDER — HYDROMORPHONE HYDROCHLORIDE 4 MG/1
4 TABLET ORAL EVERY 4 HOURS PRN
Refills: 0 | Status: DISCONTINUED | OUTPATIENT
Start: 2025-07-14 | End: 2025-07-15

## 2025-07-14 RX ORDER — HEPARIN SODIUM,PORCINE 10 UNIT/ML
5-10 VIAL (ML) INTRAVENOUS EVERY 24 HOURS
Status: DISCONTINUED | OUTPATIENT
Start: 2025-07-14 | End: 2025-07-15 | Stop reason: HOSPADM

## 2025-07-14 RX ORDER — LORAZEPAM 0.5 MG/1
0.5 TABLET ORAL EVERY 6 HOURS PRN
Status: DISCONTINUED | OUTPATIENT
Start: 2025-07-14 | End: 2025-07-15

## 2025-07-14 RX ORDER — FOLIC ACID 1 MG/1
1 TABLET ORAL ONCE
Status: COMPLETED | OUTPATIENT
Start: 2025-07-14 | End: 2025-07-14

## 2025-07-14 RX ORDER — NALOXONE HYDROCHLORIDE 0.4 MG/ML
0.4 INJECTION, SOLUTION INTRAMUSCULAR; INTRAVENOUS; SUBCUTANEOUS
Status: DISCONTINUED | OUTPATIENT
Start: 2025-07-14 | End: 2025-07-18 | Stop reason: HOSPADM

## 2025-07-14 RX ORDER — MULTIPLE VITAMINS W/ MINERALS TAB 9MG-400MCG
1 TAB ORAL ONCE
Status: COMPLETED | OUTPATIENT
Start: 2025-07-14 | End: 2025-07-14

## 2025-07-14 RX ORDER — LIDOCAINE 40 MG/G
CREAM TOPICAL
Status: DISCONTINUED | OUTPATIENT
Start: 2025-07-14 | End: 2025-07-18 | Stop reason: HOSPADM

## 2025-07-14 RX ORDER — HYDROMORPHONE HYDROCHLORIDE 2 MG/1
2-4 TABLET ORAL EVERY 4 HOURS PRN
Status: DISCONTINUED | OUTPATIENT
Start: 2025-07-14 | End: 2025-07-14

## 2025-07-14 RX ORDER — POLYETHYLENE GLYCOL 3350 17 G/17G
17 POWDER, FOR SOLUTION ORAL DAILY PRN
Status: DISCONTINUED | OUTPATIENT
Start: 2025-07-14 | End: 2025-07-15

## 2025-07-14 RX ORDER — ONDANSETRON 4 MG/1
8 TABLET, ORALLY DISINTEGRATING ORAL EVERY 8 HOURS PRN
Status: DISCONTINUED | OUTPATIENT
Start: 2025-07-14 | End: 2025-07-15

## 2025-07-14 RX ORDER — THIAMINE HYDROCHLORIDE 100 MG/ML
100 INJECTION, SOLUTION INTRAMUSCULAR; INTRAVENOUS ONCE
Status: COMPLETED | OUTPATIENT
Start: 2025-07-14 | End: 2025-07-14

## 2025-07-14 RX ORDER — FOLIC ACID 1 MG/1
1 TABLET ORAL ONCE
Status: DISCONTINUED | OUTPATIENT
Start: 2025-07-14 | End: 2025-07-14

## 2025-07-14 RX ORDER — AMOXICILLIN 250 MG
1 CAPSULE ORAL 2 TIMES DAILY PRN
Status: DISCONTINUED | OUTPATIENT
Start: 2025-07-14 | End: 2025-07-15

## 2025-07-14 RX ORDER — AMOXICILLIN 250 MG
2 CAPSULE ORAL 2 TIMES DAILY PRN
Status: DISCONTINUED | OUTPATIENT
Start: 2025-07-14 | End: 2025-07-15

## 2025-07-14 RX ORDER — SODIUM CHLORIDE 9 MG/ML
INJECTION, SOLUTION INTRAVENOUS ONCE
Status: COMPLETED | OUTPATIENT
Start: 2025-07-14 | End: 2025-07-14

## 2025-07-14 RX ORDER — ONDANSETRON 2 MG/ML
4 INJECTION INTRAMUSCULAR; INTRAVENOUS EVERY 8 HOURS PRN
Status: DISCONTINUED | OUTPATIENT
Start: 2025-07-14 | End: 2025-07-15 | Stop reason: HOSPADM

## 2025-07-14 RX ADMIN — SODIUM CHLORIDE 1000 ML: 0.9 INJECTION, SOLUTION INTRAVENOUS at 14:44

## 2025-07-14 RX ADMIN — SODIUM CHLORIDE: 0.9 INJECTION, SOLUTION INTRAVENOUS at 12:32

## 2025-07-14 RX ADMIN — THIAMINE HYDROCHLORIDE 100 MG: 100 INJECTION, SOLUTION INTRAMUSCULAR; INTRAVENOUS at 23:49

## 2025-07-14 RX ADMIN — OLANZAPINE 5 MG: 5 TABLET, FILM COATED ORAL at 23:47

## 2025-07-14 RX ADMIN — FOLIC ACID 1 MG: 1 TABLET ORAL at 23:47

## 2025-07-14 RX ADMIN — FAMOTIDINE 20 MG: 10 INJECTION, SOLUTION INTRAVENOUS at 12:37

## 2025-07-14 RX ADMIN — Medication 1 TABLET: at 23:47

## 2025-07-14 RX ADMIN — ONDANSETRON 8 MG: 4 TABLET, ORALLY DISINTEGRATING ORAL at 23:13

## 2025-07-14 RX ADMIN — PROCHLORPERAZINE EDISYLATE 10 MG: 5 INJECTION INTRAMUSCULAR; INTRAVENOUS at 12:41

## 2025-07-14 ASSESSMENT — ACTIVITIES OF DAILY LIVING (ADL)
ADLS_ACUITY_SCORE: 15
ADLS_ACUITY_SCORE: 41

## 2025-07-14 ASSESSMENT — COLUMBIA-SUICIDE SEVERITY RATING SCALE - C-SSRS
6. HAVE YOU EVER DONE ANYTHING, STARTED TO DO ANYTHING, OR PREPARED TO DO ANYTHING TO END YOUR LIFE?: NO
2. HAVE YOU ACTUALLY HAD ANY THOUGHTS OF KILLING YOURSELF IN THE PAST MONTH?: NO
1. IN THE PAST MONTH, HAVE YOU WISHED YOU WERE DEAD OR WISHED YOU COULD GO TO SLEEP AND NOT WAKE UP?: NO

## 2025-07-14 NOTE — ED PROVIDER NOTES
ED Provider Note  [facility]      History     Chief Complaint   Patient presents with    Dehydration    Anorexia     The history is provided by the patient and medical records. No  was used.     Roula Darling is a 50 year old female with history of metastatic rectal adenocarcinoma on chemotherapy (currently in clinical trial, cycle 1 started 6/20/2025) who presents to the emergency department for evaluation of weakness in the setting of lack of appetite and dehydration. She reports decreased appetite recently, hasn't been eating or drinking much at home, feels this has been ongoing for a while but acutely worsened in the last week or so. She denies any pain or infectious symptoms. No fever, cough above her baseline, congestion, runny nose, sore throat, chest pain, shortness of breath, abdominal pain, nausea, diarrhea, urinary symptoms, or any other concerns at this time. She has emesis about 3 times daily at baseline but hasn't had any episodes today. She was at a research oncology appointment earlier today and was referred here due to her decreased appetite and inability to eat while there.     Past Medical History  Past Medical History:   Diagnosis Date    Asthma     High school;  now resolved    Colorectal cancer (H) 2023     Past Surgical History:   Procedure Laterality Date    IR CHEST PORT PLACEMENT > 5 YRS OF AGE  02/17/2023    URINARY SURGERY      as a child     benzonatate (TESSALON) 100 MG capsule  HYDROmorphone (DILAUDID) 2 MG tablet  ibuprofen (ADVIL/MOTRIN) 200 MG capsule  lidocaine-prilocaine (EMLA) 2.5-2.5 % external cream  LORazepam (ATIVAN) 0.5 MG tablet  OLANZapine (ZYPREXA) 2.5 MG tablet  ondansetron (ZOFRAN ODT) 8 MG ODT tab  ondansetron (ZOFRAN) 8 MG tablet  polyethylene glycol (MIRALAX) 17 g packet  prochlorperazine (COMPAZINE) 10 MG tablet  senna (SENOKOT) 8.6 MG tablet      Allergies   Allergen Reactions    Oxaliplatin Anaphylaxis    Red Dye  #40 (Allura Red) Unknown     Family History  Family History   Problem Relation Age of Onset    Melanoma Maternal Grandmother     Cancer Paternal Grandmother     Lung Cancer Paternal Grandfather      Social History   Social History     Tobacco Use    Smoking status: Former     Current packs/day: 0.00     Types: Cigarettes     Quit date:      Years since quittin.5    Smokeless tobacco: Never   Substance Use Topics    Drug use: Yes     Comment: Occasional gummies      A medically appropriate review of systems was performed with pertinent positives and negatives noted in the HPI, and all other systems negative.    Physical Exam   BP: 115/75  Pulse: 100  Temp: 98.5  F (36.9  C)  Resp: 16  SpO2: 95 %  Physical Exam  Constitutional:       General: She is not in acute distress.     Appearance: She is not toxic-appearing or diaphoretic.      Comments: Appears chronically ill   HENT:      Head: Normocephalic and atraumatic.      Nose: No congestion or rhinorrhea.   Eyes:      General: No scleral icterus.     Conjunctiva/sclera: Conjunctivae normal.   Cardiovascular:      Rate and Rhythm: Regular rhythm. Tachycardia present.      Heart sounds: Normal heart sounds.   Pulmonary:      Effort: Pulmonary effort is normal. No respiratory distress.      Breath sounds: Normal breath sounds. No stridor. No wheezing, rhonchi or rales.      Comments: Intermittent nonproductive cough  Abdominal:      General: Abdomen is flat.      Palpations: Abdomen is soft.      Tenderness: There is no abdominal tenderness. There is no guarding or rebound.   Musculoskeletal:      Cervical back: Normal range of motion and neck supple.   Skin:     General: Skin is warm.   Neurological:      General: No focal deficit present.      Mental Status: She is alert and oriented to person, place, and time.   Psychiatric:         Attention and Perception: Attention normal.         Mood and Affect: Affect is flat.         Speech: Speech normal.          Behavior: Behavior normal. Behavior is cooperative.         Thought Content: Thought content normal.         ED Course, Procedures, & Data      Procedures          Results for orders placed or performed during the hospital encounter of 07/14/25   Lactic acid whole blood with 1x repeat in 2 hr when >2   Result Value Ref Range    Lactic Acid, Initial 0.9 0.7 - 2.0 mmol/L   Ketone Beta-Hydroxybutyrate Quantitative   Result Value Ref Range    Ketone (Beta-Hydroxybutyrate) Quantitative 3.46 (HH) <=0.30 mmol/L     Medications   benzonatate (TESSALON) capsule 100 mg (has no administration in time range)   OLANZapine (zyPREXA) tablet 5 mg (has no administration in time range)   ondansetron (ZOFRAN ODT) ODT tab 8 mg (has no administration in time range)   polyethylene glycol (MIRALAX) Packet 17 g (has no administration in time range)   prochlorperazine (COMPAZINE) tablet 10 mg (has no administration in time range)   LORazepam (ATIVAN) tablet 0.5 mg (has no administration in time range)   lidocaine 1 % 0.1-1 mL (has no administration in time range)   lidocaine (LMX4) cream (has no administration in time range)   sodium chloride (PF) 0.9% PF flush 3 mL (has no administration in time range)   sodium chloride (PF) 0.9% PF flush 3 mL (has no administration in time range)   senna-docusate (SENOKOT-S/PERICOLACE) 8.6-50 MG per tablet 1 tablet (has no administration in time range)     Or   senna-docusate (SENOKOT-S/PERICOLACE) 8.6-50 MG per tablet 2 tablet (has no administration in time range)   calcium carbonate (TUMS) chewable tablet 1,000 mg (has no administration in time range)   naloxone (NARCAN) injection 0.2 mg (has no administration in time range)     Or   naloxone (NARCAN) injection 0.4 mg (has no administration in time range)     Or   naloxone (NARCAN) injection 0.2 mg (has no administration in time range)     Or   naloxone (NARCAN) injection 0.4 mg (has no administration in time range)   HYDROmorphone (DILAUDID) tablet 2  mg (has no administration in time range)     Or   HYDROmorphone (DILAUDID) tablet 4 mg (has no administration in time range)   multivitamin w/minerals (THERA-VIT-M) tablet 1 tablet (has no administration in time range)   thiamine (B-1) injection 100 mg (has no administration in time range)   folic acid (FOLVITE) tablet 1 mg (has no administration in time range)          Critical care was not performed.     Medical Decision Making  The patient's presentation was of moderate complexity (an undiagnosed new problem with uncertain prognosis).    The patient's evaluation involved:  review of external note(s) from 3+ sources (clinical notes including from appointment earlier today)  review of 3+ test result(s) ordered prior to this encounter (outpatient labs obtained earlier today)  ordering and/or review of 2 test(s) in this encounter (see separate area of note for details)  discussion of management or test interpretation with another health professional (hospitalist)    The patient's management necessitated high risk (a decision regarding hospitalization).    Assessment & Plan    Roula Darling is a 50 year old female with history of metastatic rectal adenocarcinoma on chemotherapy (currently in clinical trial, cycle 1 started 6/20/2025) who presents to the emergency department for evaluation of weakness in the setting of lack of appetite and dehydration. She hasn't been hungry and has had little desire to eat for a while now but this acutely worsened in the last week, now feeling weak due to not eating. She denies any pain or infectious symptoms. On evaluation, she is borderline tachycardic, vitals are otherwise in acceptable ranges, normotensive, afebrile, normoxic. Patient is awake, alert, answering questions appropriately, appears chronically ill. Breathing comfortably on room air, no respiratory distress, no adventitious lung sounds, normal heart rate, regular rhythm. Abdomen is soft, nontender, no rebound or  guarding. Exam is otherwise benign. Reviewed outpatient labs from earlier today, notable for anion gap acidosis, no leukocytosis or anemia, UA without signs of infection. Abdominal X-ray not read by radiology but no free air visible, no evidence of obstruction. Here noted to have lactate WNL at 0.9 but elevated ketones to 3.46. Suspect starvation ketosis. Given IV fluids in clinic which were continued here. Discussed results with patient, patient is concerned that if she were to discharge to home, she would continue to not eat and would end up coming back, interested in staying if possible to explore options. Feel it is reasonable for patient to come in for further treatment of ketosis and can discuss possibility of NG tube vs PEG tube. Discussed patient with on-call hospitalist, Dr. Monge, who accepted patient for observation admission. Patient in understanding and agreement with plan and had no additional questions. She has remained stable while in the ED and will be admitted to medicine service for further evaluation and management.     I have reviewed the nursing notes. I have reviewed the findings, diagnosis, plan and need for follow up with the patient.    New Prescriptions    No medications on file       Final diagnoses:   Starvation ketoacidosis       Katherin Bright PA-C   [facility] EMERGENCY DEPARTMENT  7/14/2025     Katherin Bright PA-C  07/14/25 2119

## 2025-07-14 NOTE — PROGRESS NOTES
"Clinical Research Unit Nursing Note:  Roula Darling presents today for Cycle 1 Day 15, Study- TORL 1-23 (IDS #[id number]).     Patient seen by provider today: Yes: Myla Deleon MD                             present during visit today: Not Applicable.     Note:   Day 15 of Cycle 1.    Intravenous Access:  Port accessed for SOC labs, research labs and infusions.      Patient arrived tachypneic and tachycardic; she reports symptoms started when leaving her house to drive to clinic; appears improved when supine. EKG shows some known abnormal etiology, Vitals and EKG obtained per protocol. Qtc WNL.       Patient Vitals for the past 24 hours:     BP  Pulse  Resp  SaO2 Temp  07/14/25 1552  108/75  108  18  98%    07/14/25 1448  106/70  115    07/14/25 1029  103/67  146  26  100% 36.8 C    IV fluids infused as ordered; HR improving. Transferred off unit for abdominal xray. Additional bag of IV fluids started upon return and chemistry panel rechecked. Trialed oral intake and reports being able to eat a few pieces of chex-mix but was too \"full\" for a nibble of toast; Reported that she started moaning in discomfort after a small bite jell-o. Denies vomiting but reports she will vomit if she tries any more food. Mood appears improved over the course of the visit. Due to low tolerance for oral intake, MD recommended transfer to the ED for more symptom management.       Discharge Plan:   Patient discharged in stable condition accompanied by: RN.  Departure Mode: wheelchair.     Palma Max RN on 7/14/2025 at 5:13 PM      "

## 2025-07-14 NOTE — PROGRESS NOTES
DEVELOPMENTAL THERAPEUTICS CLINIC      PATIENT NAME: Roula Darling MRN # [id number]  DATE OF VISIT: July 14, 2025 YOB: 1975    Study: TORL1-23  Date of Consent: 10-JONATHAN-2025  C1D1: 07-JUL-2025    Cancer Type: Rectal cancer  Primary Oncologist: Dr. Kiki Hernandez at     Reason for Visit: C1D8      Assessment/Plan    ECOG PS: 2-3    Rectal cancer: C1D8 - hold treatment today due to acute issues. Can try again next week. I'm worried about the LDH. Screening CT was 6/20, personally reviewed. Will check CEA but I don't think rapid PD is the main underlying problem driving her symptoms but is a possibility.     Dehydration: NS 1 liter bolus, felt a little better, but anion gap metabolic acidosis remains. Seeing if she can eat/drink prior to dispo today. Gave second liter in afternoon.     AGMA: Check lactic acid acid 2 liters NS. If remains elevated, will send to ED.     Hyponatremia: Serum osm, urine osm, Gaby - urine osm/Gaby not c/w SIADH, await serum osm, recheck after NS bolus.    N/V: Could be driven by obstipation, etc. AXR, IVF, compazine 10 mg IV once, ondansetron IV prn, famotidine IV. ADDENDUM: Felt better after IV interventions. AXR shows some dilated loops of I think large bowel, some stool burden on left, and no gas in the pelvis. Lactic acid as above. ADDENDUM #2: Unable to eat mor than one small bite of jello. Not safe to go home. Will send to ED for further evaluation and management. Still awaiting lactic acid to result.     Anorexia: Driven by cancer, bowel issues as above.     Fatigue: A little worse in last week     Microcytic anemia: Ferritin (anticipate will be high), iron studies    Cough: A little better controlled with hydromorphone prn    LDH elevated: Check uric acid as well. TLS would not be expected with rectal ca and an ADC     90 minutes spent on the date of the encounter doing chart review, review of outside records, review of test results, interpretation of tests,  patient visit, documentation, and discussion with other provider(s)        Interval History  Not feeling well  Last BM Fri - usual is once daily. Some diarrhea last week   Two types of pain - L lower back and epigastric. Epigastric pain not necessarily better with famotidine, which she's already taking regularly at home (not on med list). Calls it hunger pain  N/V - some emesis since starting TORL1-23  More tired since C1D1  No neuropathy  Breathing ok   No F/C   Cough perhaps a little better. Has been trying hydromorphone, usually twice daily, for that, based on Dr. Rdz's recommendations      Cancer History  (2/15/2023 - 2/21/2023) Palliative radiation, 2500 cGy to pelvis in 5 fractions.  (2/22/2023 - 05/01/2023.) FOLFOX chemotherapy every 2 weeks.  05/01/2023: Oxaliplatin reaction needing EpiPen. Oxaliplatin desensitization failed 05/08/2023. Hence patient received 5 FU only that treatment  05/22/2023- 1/16/24: FOLFIRI with growth factor support every 2 weeks. Progressive disease in the lungs  2/6/24-4/2/24: Lonsurf with bevacizumab  04/20/2024-5/20/24: Regorafenib  6/4/24- 07/30/2024. : clinical trial [id number]  08/05/2024-02/05/2025: Oxaliplatin desensitization plan for FOLFOX   02/24/2025- 03/10/2025:5 FU alone  04/01/2025:Fruquintinib     Molecular/NGS  [facility] completed 03/13/2023. K-jerilyn G12V mutated.    Other mutations:  KRAS G12 V  NRAS wild type  [id number]  FBXW7  PTEN  SMAD4  Tp53     Microsatellite stable  TMB: 4 mut/MB     BRAF negative     Others: Her2 IHC1+     Pre-screening for TORL1, 23 - Claudin 6 negative (Reported on 3/19/25)    Past Medical History  Past Medical History:   Diagnosis Date    Asthma     High school;  now resolved    Colorectal cancer (H) 2023     Current Medications  Current Outpatient Medications   Medication Sig Dispense Refill    benzonatate (TESSALON) 100 MG capsule Take 100 mg by mouth.      HYDROmorphone (DILAUDID) 2 MG tablet Take 2-4 mg by mouth.      ibuprofen  (ADVIL/MOTRIN) 200 MG capsule       lidocaine-prilocaine (EMLA) 2.5-2.5 % external cream APPLY TO PORT-A-CATH SITE ONE HOUR PRIOR TO NEEDLE ACCESS.      LORazepam (ATIVAN) 0.5 MG tablet Take 2 tablets (1 mg) by mouth every 6 hours as needed for nausea or vomiting. 30 tablet 0    OLANZapine (ZYPREXA) 2.5 MG tablet Take 2 tablets (5 mg) by mouth at bedtime. 30 tablet 0    ondansetron (ZOFRAN ODT) 8 MG ODT tab Take 1 tablet (8 mg) by mouth every 8 hours as needed for nausea. 60 tablet 3    ondansetron (ZOFRAN) 8 MG tablet Take 8 mg by mouth.      polyethylene glycol (MIRALAX) 17 g packet Take 17 g by mouth daily as needed.      prochlorperazine (COMPAZINE) 10 MG tablet Take 1 tablet by mouth every 6 hours as needed.      senna (SENOKOT) 8.6 MG tablet Take 2 tablets by mouth.        Review of Systems: As above in the HPI, o/w complete 12-point ROS was negative.    Physical Exam  /67 (BP Location: Right arm, Patient Position: Sitting, Cuff Size: Adult Regular)   Pulse (!) 146   Temp 98.2  F (36.8  C) (Oral)   Resp 26   Wt 49.2 kg (108 lb 7.5 oz)   SpO2 100%   BMI 18.18 kg/m     GEN: NAD. Looks tired and pale  HEENT: EOMI, no icterus, injection or pallor. Oropharynx clear. No thrush  LUNGS: clear bilaterally  CV: regular, no murmurs, rubs, or gallops, mildly tachycardic  ABDOMEN: soft, non-tender, non-distended, decreased bowel sounds   EXT: warm, well perfused, trace, very minimal edema LLE; none on right (pt says this is baseline)  NEURO: alert  SKIN: no rashes    Laboratory and Imaging Studies   07/14/25 10:58 07/14/25 13:56   Sodium 129 (L) 131 (L)   Potassium 3.8 3.7   Chloride 89 (L) 95 (L)   Carbon Dioxide (CO2) 19 (L) 19 (L)   Urea Nitrogen 6.4 5.2 (L)   Creatinine 0.45 (L) 0.41 (L)   GFR Estimate >90 >90   Calcium 9.3 8.3 (L)   Anion Gap 21 (H) 17 (H)   Magnesium 1.9    Phosphorus 3.2    Albumin 3.7    Protein Total 7.4    Alkaline Phosphatase 174 (H)    ALT 12    AST 57 (H)    Amylase 42    Bilirubin  Total 0.5    Ferritin 598 (H)    Glucose 84 80   Iron 13 (L)    Iron Binding Capacity 162 (L)    Iron Sat Index 8 (L)    Lactate Dehydrogenase 1,487 (H)    Lipase 23    Osmolality  269 (L)   Sodium Urine mmol/L 35    Uric Acid 6.3 (H)    Urine Osmolality 441    WBC 8.0    Hemoglobin 9.7 (L)    Hematocrit 30.5 (L)    Platelet Count 506 (H)    RBC Count 4.01    MCV 76 (L)    MCH 24.2 (L)    MCHC 31.8    RDW 18.6 (H)    % Neutrophils 79    % Lymphocytes 5    % Monocytes 14    % Eosinophils 0    % Basophils 1    % Immature Granulocytes 1    NRBC/W 0    Absolute Neutrophil 6.3    Absolute Lymphocytes 0.4 (L)    Absolute Monocytes 1.1    Absolute Eosinophils 0.0    Absolute Basophils 0.0    Absolute Immature Granulocytes 0.1    Absolute NRBCs 0.0    INR 1.36 (H)    PT 17.0 (H)    Color Urine Light Yellow    Appearance Urine Clear    Glucose Urine Negative    Bilirubin Urine Negative    Ketones Urine 100 !    Specific Gravity Urine 1.015    pH Urine 6.0    Protein Albumin Urine 20 !    Urobilinogen mg/dL Normal    Nitrite Urine Negative    Blood Urine Negative    Leukocyte Esterase Urine Negative    WBC Urine 3    RBC Urine <1    Mucus Urine Present !      Labs were independently reviewed and interpreted by me as above    Imaging was personally reviewed and interpreted by me - AXR as above.

## 2025-07-14 NOTE — ED TRIAGE NOTES
Arrives by w/c with loss of appetite and dehydration. States she was recently seen for this problem.     Hx rectal cancer with mets.      Triage Assessment (Adult)       Row Name 07/14/25 1983          Triage Assessment    Airway WDL WDL        Respiratory WDL    Respiratory WDL WDL        Skin Circulation/Temperature WDL    Skin Circulation/Temperature WDL WDL        Cardiac WDL    Cardiac WDL WDL        Peripheral/Neurovascular WDL    Peripheral Neurovascular WDL WDL        Cognitive/Neuro/Behavioral WDL    Cognitive/Neuro/Behavioral WDL WDL

## 2025-07-15 ENCOUNTER — APPOINTMENT (OUTPATIENT)
Dept: GENERAL RADIOLOGY | Facility: CLINIC | Age: 50
End: 2025-07-15
Attending: STUDENT IN AN ORGANIZED HEALTH CARE EDUCATION/TRAINING PROGRAM
Payer: COMMERCIAL

## 2025-07-15 ENCOUNTER — APPOINTMENT (OUTPATIENT)
Dept: CT IMAGING | Facility: CLINIC | Age: 50
End: 2025-07-15
Attending: STUDENT IN AN ORGANIZED HEALTH CARE EDUCATION/TRAINING PROGRAM
Payer: COMMERCIAL

## 2025-07-15 LAB
ALBUMIN SERPL BCG-MCNC: 3.2 G/DL (ref 3.5–5.2)
ALP SERPL-CCNC: 163 U/L (ref 40–150)
ALT SERPL W P-5'-P-CCNC: 13 U/L (ref 0–50)
ANION GAP SERPL CALCULATED.3IONS-SCNC: 16 MMOL/L (ref 7–15)
ANION GAP SERPL CALCULATED.3IONS-SCNC: 19 MMOL/L (ref 7–15)
AST SERPL W P-5'-P-CCNC: 67 U/L (ref 0–45)
B-OH-BUTYR SERPL-SCNC: 3.5 MMOL/L
BASOPHILS # BLD AUTO: 0 10E3/UL (ref 0–0.2)
BASOPHILS NFR BLD AUTO: 1 %
BILIRUB SERPL-MCNC: 0.4 MG/DL
BUN SERPL-MCNC: 2.3 MG/DL (ref 6–20)
BUN SERPL-MCNC: 3.4 MG/DL (ref 6–20)
CALCIUM SERPL-MCNC: 8.2 MG/DL (ref 8.8–10.4)
CALCIUM SERPL-MCNC: 8.3 MG/DL (ref 8.8–10.4)
CHLORIDE SERPL-SCNC: 92 MMOL/L (ref 98–107)
CHLORIDE SERPL-SCNC: 94 MMOL/L (ref 98–107)
CREAT SERPL-MCNC: 0.35 MG/DL (ref 0.51–0.95)
CREAT SERPL-MCNC: 0.39 MG/DL (ref 0.51–0.95)
EGFRCR SERPLBLD CKD-EPI 2021: >90 ML/MIN/1.73M2
EGFRCR SERPLBLD CKD-EPI 2021: >90 ML/MIN/1.73M2
EOSINOPHIL # BLD AUTO: 0 10E3/UL (ref 0–0.7)
EOSINOPHIL NFR BLD AUTO: 0 %
ERYTHROCYTE [DISTWIDTH] IN BLOOD BY AUTOMATED COUNT: 18.5 % (ref 10–15)
GLUCOSE BLDC GLUCOMTR-MCNC: 81 MG/DL (ref 70–99)
GLUCOSE BLDC GLUCOMTR-MCNC: 89 MG/DL (ref 70–99)
GLUCOSE SERPL-MCNC: 100 MG/DL (ref 70–99)
GLUCOSE SERPL-MCNC: 87 MG/DL (ref 70–99)
HCO3 SERPL-SCNC: 19 MMOL/L (ref 22–29)
HCO3 SERPL-SCNC: 22 MMOL/L (ref 22–29)
HCT VFR BLD AUTO: 27.4 % (ref 35–47)
HGB BLD-MCNC: 8.7 G/DL (ref 11.7–15.7)
IMM GRANULOCYTES # BLD: 0.1 10E3/UL
IMM GRANULOCYTES NFR BLD: 1 %
LDH SERPL L TO P-CCNC: 1404 U/L (ref 0–250)
LYMPHOCYTES # BLD AUTO: 0.3 10E3/UL (ref 0.8–5.3)
LYMPHOCYTES NFR BLD AUTO: 4 %
MAGNESIUM SERPL-MCNC: 1.8 MG/DL (ref 1.7–2.3)
MAGNESIUM SERPL-MCNC: 1.9 MG/DL (ref 1.7–2.3)
MCH RBC QN AUTO: 24.6 PG (ref 26.5–33)
MCHC RBC AUTO-ENTMCNC: 31.8 G/DL (ref 31.5–36.5)
MCV RBC AUTO: 78 FL (ref 78–100)
MONOCYTES # BLD AUTO: 1.1 10E3/UL (ref 0–1.3)
MONOCYTES NFR BLD AUTO: 14 %
NEUTROPHILS # BLD AUTO: 6.1 10E3/UL (ref 1.6–8.3)
NEUTROPHILS NFR BLD AUTO: 80 %
NRBC # BLD AUTO: 0 10E3/UL
NRBC BLD AUTO-RTO: 0 /100
PHOSPHATE SERPL-MCNC: 2.6 MG/DL (ref 2.5–4.5)
PHOSPHATE SERPL-MCNC: 2.8 MG/DL (ref 2.5–4.5)
PLATELET # BLD AUTO: 443 10E3/UL (ref 150–450)
POTASSIUM SERPL-SCNC: 3.4 MMOL/L (ref 3.4–5.3)
POTASSIUM SERPL-SCNC: 3.6 MMOL/L (ref 3.4–5.3)
PROT SERPL-MCNC: 6.7 G/DL (ref 6.4–8.3)
RBC # BLD AUTO: 3.53 10E6/UL (ref 3.8–5.2)
SODIUM SERPL-SCNC: 130 MMOL/L (ref 135–145)
SODIUM SERPL-SCNC: 132 MMOL/L (ref 135–145)
URATE SERPL-MCNC: 5.9 MG/DL (ref 2.4–5.7)
WBC # BLD AUTO: 7.6 10E3/UL (ref 4–11)

## 2025-07-15 PROCEDURE — 83735 ASSAY OF MAGNESIUM: CPT | Performed by: PHYSICIAN ASSISTANT

## 2025-07-15 PROCEDURE — 250N000013 HC RX MED GY IP 250 OP 250 PS 637: Performed by: STUDENT IN AN ORGANIZED HEALTH CARE EDUCATION/TRAINING PROGRAM

## 2025-07-15 PROCEDURE — 250N000011 HC RX IP 250 OP 636: Performed by: STUDENT IN AN ORGANIZED HEALTH CARE EDUCATION/TRAINING PROGRAM

## 2025-07-15 PROCEDURE — 999N000065 XR ABDOMEN PORT 1 VIEW

## 2025-07-15 PROCEDURE — 44500 INTRO GASTROINTESTINAL TUBE: CPT

## 2025-07-15 PROCEDURE — 82310 ASSAY OF CALCIUM: CPT | Performed by: PHYSICIAN ASSISTANT

## 2025-07-15 PROCEDURE — 99233 SBSQ HOSP IP/OBS HIGH 50: CPT | Performed by: STUDENT IN AN ORGANIZED HEALTH CARE EDUCATION/TRAINING PROGRAM

## 2025-07-15 PROCEDURE — 74177 CT ABD & PELVIS W/CONTRAST: CPT | Mod: 26 | Performed by: STUDENT IN AN ORGANIZED HEALTH CARE EDUCATION/TRAINING PROGRAM

## 2025-07-15 PROCEDURE — 120N000002 HC R&B MED SURG/OB UMMC

## 2025-07-15 PROCEDURE — 82010 KETONE BODYS QUAN: CPT | Performed by: PHYSICIAN ASSISTANT

## 2025-07-15 PROCEDURE — 82310 ASSAY OF CALCIUM: CPT | Performed by: STUDENT IN AN ORGANIZED HEALTH CARE EDUCATION/TRAINING PROGRAM

## 2025-07-15 PROCEDURE — 99418 PROLNG IP/OBS E/M EA 15 MIN: CPT | Mod: GC | Performed by: STUDENT IN AN ORGANIZED HEALTH CARE EDUCATION/TRAINING PROGRAM

## 2025-07-15 PROCEDURE — 74018 RADEX ABDOMEN 1 VIEW: CPT | Mod: 26 | Performed by: RADIOLOGY

## 2025-07-15 PROCEDURE — 99223 1ST HOSP IP/OBS HIGH 75: CPT | Mod: GC | Performed by: STUDENT IN AN ORGANIZED HEALTH CARE EDUCATION/TRAINING PROGRAM

## 2025-07-15 PROCEDURE — 250N000009 HC RX 250: Performed by: STUDENT IN AN ORGANIZED HEALTH CARE EDUCATION/TRAINING PROGRAM

## 2025-07-15 PROCEDURE — 83615 LACTATE (LD) (LDH) ENZYME: CPT | Performed by: PHYSICIAN ASSISTANT

## 2025-07-15 PROCEDURE — 85004 AUTOMATED DIFF WBC COUNT: CPT | Performed by: PHYSICIAN ASSISTANT

## 2025-07-15 PROCEDURE — 99418 PROLNG IP/OBS E/M EA 15 MIN: CPT | Performed by: STUDENT IN AN ORGANIZED HEALTH CARE EDUCATION/TRAINING PROGRAM

## 2025-07-15 PROCEDURE — 999N000007 HC SITE CHECK

## 2025-07-15 PROCEDURE — 84550 ASSAY OF BLOOD/URIC ACID: CPT | Performed by: PHYSICIAN ASSISTANT

## 2025-07-15 PROCEDURE — 84100 ASSAY OF PHOSPHORUS: CPT | Performed by: PHYSICIAN ASSISTANT

## 2025-07-15 PROCEDURE — 71260 CT THORAX DX C+: CPT

## 2025-07-15 PROCEDURE — 71260 CT THORAX DX C+: CPT | Mod: 26 | Performed by: STUDENT IN AN ORGANIZED HEALTH CARE EDUCATION/TRAINING PROGRAM

## 2025-07-15 RX ORDER — PANTOPRAZOLE SODIUM 40 MG/1
40 TABLET, DELAYED RELEASE ORAL DAILY
Status: DISCONTINUED | OUTPATIENT
Start: 2025-07-15 | End: 2025-07-16

## 2025-07-15 RX ORDER — HYDROMORPHONE HYDROCHLORIDE 4 MG/1
4 TABLET ORAL EVERY 4 HOURS PRN
Refills: 0 | Status: DISCONTINUED | OUTPATIENT
Start: 2025-07-15 | End: 2025-07-18 | Stop reason: HOSPADM

## 2025-07-15 RX ORDER — HEPARIN SODIUM (PORCINE) LOCK FLUSH IV SOLN 100 UNIT/ML 100 UNIT/ML
5-10 SOLUTION INTRAVENOUS
Status: DISCONTINUED | OUTPATIENT
Start: 2025-07-15 | End: 2025-07-18 | Stop reason: HOSPADM

## 2025-07-15 RX ORDER — DEXTROSE MONOHYDRATE 100 MG/ML
INJECTION, SOLUTION INTRAVENOUS CONTINUOUS PRN
Status: DISCONTINUED | OUTPATIENT
Start: 2025-07-15 | End: 2025-07-18 | Stop reason: HOSPADM

## 2025-07-15 RX ORDER — HEPARIN SODIUM,PORCINE 10 UNIT/ML
5-10 VIAL (ML) INTRAVENOUS EVERY 24 HOURS
Status: DISCONTINUED | OUTPATIENT
Start: 2025-07-15 | End: 2025-07-18 | Stop reason: HOSPADM

## 2025-07-15 RX ORDER — PROCHLORPERAZINE MALEATE 5 MG/1
10 TABLET ORAL EVERY 6 HOURS PRN
Status: DISCONTINUED | OUTPATIENT
Start: 2025-07-15 | End: 2025-07-18 | Stop reason: HOSPADM

## 2025-07-15 RX ORDER — LORAZEPAM 0.5 MG/1
0.5 TABLET ORAL EVERY 6 HOURS PRN
Status: DISCONTINUED | OUTPATIENT
Start: 2025-07-15 | End: 2025-07-18 | Stop reason: HOSPADM

## 2025-07-15 RX ORDER — AMOXICILLIN 250 MG
2 CAPSULE ORAL 2 TIMES DAILY PRN
Status: DISCONTINUED | OUTPATIENT
Start: 2025-07-15 | End: 2025-07-18 | Stop reason: HOSPADM

## 2025-07-15 RX ORDER — ONDANSETRON 4 MG/1
8 TABLET, ORALLY DISINTEGRATING ORAL EVERY 8 HOURS PRN
Status: DISCONTINUED | OUTPATIENT
Start: 2025-07-15 | End: 2025-07-16

## 2025-07-15 RX ORDER — GUAIFENESIN 600 MG/1
15 TABLET, EXTENDED RELEASE ORAL DAILY
Status: DISCONTINUED | OUTPATIENT
Start: 2025-07-15 | End: 2025-07-18 | Stop reason: HOSPADM

## 2025-07-15 RX ORDER — IBUPROFEN 200 MG
200 TABLET ORAL EVERY 4 HOURS PRN
COMMUNITY

## 2025-07-15 RX ORDER — POLYETHYLENE GLYCOL 3350 17 G/17G
17 POWDER, FOR SOLUTION ORAL DAILY PRN
Status: DISCONTINUED | OUTPATIENT
Start: 2025-07-15 | End: 2025-07-18 | Stop reason: HOSPADM

## 2025-07-15 RX ORDER — OLANZAPINE 5 MG/1
5 TABLET, FILM COATED ORAL AT BEDTIME
Status: DISCONTINUED | OUTPATIENT
Start: 2025-07-15 | End: 2025-07-18 | Stop reason: HOSPADM

## 2025-07-15 RX ORDER — IOPAMIDOL 755 MG/ML
100 INJECTION, SOLUTION INTRAVASCULAR ONCE
Status: COMPLETED | OUTPATIENT
Start: 2025-07-15 | End: 2025-07-15

## 2025-07-15 RX ORDER — AMOXICILLIN 250 MG
1 CAPSULE ORAL 2 TIMES DAILY PRN
Status: DISCONTINUED | OUTPATIENT
Start: 2025-07-15 | End: 2025-07-18 | Stop reason: HOSPADM

## 2025-07-15 RX ORDER — LIDOCAINE HYDROCHLORIDE 20 MG/ML
5 SOLUTION OROPHARYNGEAL ONCE
Status: COMPLETED | OUTPATIENT
Start: 2025-07-15 | End: 2025-07-15

## 2025-07-15 RX ORDER — HYDROMORPHONE HYDROCHLORIDE 2 MG/1
2 TABLET ORAL EVERY 4 HOURS PRN
Refills: 0 | Status: DISCONTINUED | OUTPATIENT
Start: 2025-07-15 | End: 2025-07-18 | Stop reason: HOSPADM

## 2025-07-15 RX ORDER — CALCIUM CARBONATE 500 MG/1
1000 TABLET, CHEWABLE ORAL 4 TIMES DAILY PRN
Status: DISCONTINUED | OUTPATIENT
Start: 2025-07-15 | End: 2025-07-18 | Stop reason: HOSPADM

## 2025-07-15 RX ORDER — DEXTROSE MONOHYDRATE 100 MG/ML
INJECTION, SOLUTION INTRAVENOUS CONTINUOUS PRN
Status: DISCONTINUED | OUTPATIENT
Start: 2025-07-15 | End: 2025-07-15

## 2025-07-15 RX ORDER — OMEPRAZOLE 20 MG/1
20 CAPSULE, DELAYED RELEASE ORAL DAILY
COMMUNITY

## 2025-07-15 RX ORDER — LIDOCAINE HYDROCHLORIDE 20 MG/ML
JELLY TOPICAL ONCE
Status: COMPLETED | OUTPATIENT
Start: 2025-07-15 | End: 2025-07-15

## 2025-07-15 RX ORDER — HEPARIN SODIUM,PORCINE 10 UNIT/ML
5-10 VIAL (ML) INTRAVENOUS
Status: DISCONTINUED | OUTPATIENT
Start: 2025-07-15 | End: 2025-07-18 | Stop reason: HOSPADM

## 2025-07-15 RX ADMIN — IOPAMIDOL 53 ML: 755 INJECTION, SOLUTION INTRAVENOUS at 21:52

## 2025-07-15 RX ADMIN — ONDANSETRON 8 MG: 4 TABLET, ORALLY DISINTEGRATING ORAL at 13:00

## 2025-07-15 RX ADMIN — HYDROMORPHONE HYDROCHLORIDE 2 MG: 2 TABLET ORAL at 21:29

## 2025-07-15 RX ADMIN — HEPARIN, PORCINE (PF) 10 UNIT/ML INTRAVENOUS SYRINGE 5 ML: at 20:20

## 2025-07-15 RX ADMIN — PANTOPRAZOLE SODIUM 40 MG: 40 TABLET, DELAYED RELEASE ORAL at 15:53

## 2025-07-15 RX ADMIN — HYDROMORPHONE HYDROCHLORIDE 2 MG: 2 TABLET ORAL at 04:46

## 2025-07-15 RX ADMIN — HEPARIN, PORCINE (PF) 10 UNIT/ML INTRAVENOUS SYRINGE 5 ML: at 04:47

## 2025-07-15 RX ADMIN — Medication 15 ML: at 15:53

## 2025-07-15 RX ADMIN — HEPARIN, PORCINE (PF) 10 UNIT/ML INTRAVENOUS SYRINGE 5 ML: at 06:10

## 2025-07-15 RX ADMIN — HEPARIN, PORCINE (PF) 10 UNIT/ML INTRAVENOUS SYRINGE 5 ML: at 22:40

## 2025-07-15 RX ADMIN — THIAMINE HCL TAB 100 MG 100 MG: 100 TAB at 15:53

## 2025-07-15 RX ADMIN — OLANZAPINE 5 MG: 5 TABLET, FILM COATED ORAL at 21:29

## 2025-07-15 RX ADMIN — SODIUM CHLORIDE 26 ML: 9 INJECTION, SOLUTION INTRAVENOUS at 21:52

## 2025-07-15 ASSESSMENT — ACTIVITIES OF DAILY LIVING (ADL)
ADLS_ACUITY_SCORE: 28
ADLS_ACUITY_SCORE: 15
ADLS_ACUITY_SCORE: 28

## 2025-07-15 NOTE — H&P
[facility]    History and Physical - Hospitalist Service, GOLD TEAM        Date of Admission:  7/14/2025    Assessment & Plan    Roula Darling is a 50 year old female with a past medical history of stage IV metastatic rectal adenocarcinoma who presents to the ED from her oncology clinic appointment with lactic acidosis and concerns for malnutrition.  She is now admitted to internal medicine as below:     Failure to Thrive  Anion Gap Metabolic Acidosis   Starvation Ketoacidosis   At Risk for Refeeding Syndrome - Patient presenting from outpatient clinic appointment d/t anorexia.  Treatment deferred today due deconditioning.  Lactic acid WNLs, however with ketosis and anion gap metabolic acidosis. S/p 2Ls NS in clinic, receiving 1L NS per ED.  Suspect starvation ketoacidosis in setting of metastatic cancer.   -give thiamine, folate, and multivitamins  -nutrition consult   -discussed likely possibility of NG tube placement vs PEG placement for malnutrition, patient agreeable but also wants to discuss if nutrition support would help her quality of life   -high protein, high kcal diet  -monitor electrolytes     Stage IV Metastatic Rectal Adenocarcinoma - Follows with Dr. Hernandez at [facility], follows with Dr. Deleon with [facility] for TOR clinical trial. Please refer to outpatient note 7/8/25 for full treatment history.   -Oncology consult while inpatient  -Antiemetics:   -Zofran   -compazine   -ativan  -continue PRN hydromorphone  -consider palliative care consult pending above interventions/discussions     Hypovolemic Hyponatremia - Sodium 131, chloride 95.  Likely in setting poor volume intake. S/p 2Ls NS  -repeat BMP in AM                   Diet:  high calorie, high protein  DVT Prophylaxis: Pneumatic Compression Devices  Arambula Catheter: Not present  Lines: PRESENT             Cardiac Monitoring: None  Code Status:  DNR/DNI - discussion with patient regarding  resuscitation process, especially in setting of metastatic cancer.  She confirms DNR/DNI    Clinically Significant Risk Factors Present on Admission         # Hyponatremia: Lowest Na = 129 mmol/L in last 2 days, will monitor as appropriate  # Hypochloremia: Lowest Cl = 89 mmol/L in last 2 days, will monitor as appropriate  # Hypocalcemia: Lowest Ca = 8.3 mg/dL in last 2 days, will monitor and replace as appropriate    # Anion Gap Metabolic Acidosis: Highest Anion Gap = 21 mmol/L in last 2 days, will monitor and treat as appropriate   # Coagulation Defect: INR = 1.36 (Ref range: 0.85 - 1.15) and/or PTT = N/A, will monitor for bleeding         # Anemia: based on hgb <11                  Disposition Plan     Medically Ready for Discharge: Anticipated in 2-4 Days         The patient's care was discussed with the Attending Physician, Dr. Monge.    Rosette Mckeon PA-C  Hospitalist Service, [facility]  Securely message with [employer] (more info)  Text page via [facility] Paging/Directory   See signed in provider for up to date coverage information    ______________________________________________________________________    Chief Complaint   Failure to thrive    History is obtained from the patient and patient's chart     History of Present Illness   Roula Darling is a 50 year old female with a past medical history as listed above who presents from her outpatient oncology appointment with failure to thrive.  Patient confirms limited oral intake with ongoing nausea.  Overall, notes fatigue and weakness.  Labs at clinic appointment with ketosis and anion gap metabolic acidosis. She received IV fluids there with some mild improvement in her symptoms.      Patient states she'd be interested in supplemental nutrition, but admits it all depends on cost, insurance coverage, and if it would improve her quality of life.  She is agreeable to meeting with nutrition.     Confirmed  "with patient she is DNR/DNI.  She notes she \"is going to die\" and we discussed that resuscitation efforts would cause more suffering without benefit.       Past Medical History    Past Medical History:   Diagnosis Date    Asthma     High school;  now resolved    Colorectal cancer (H) 2023       Past Surgical History   Past Surgical History:   Procedure Laterality Date    IR CHEST PORT PLACEMENT > 5 YRS OF AGE  02/17/2023    URINARY SURGERY      as a child       Prior to Admission Medications   Prior to Admission Medications   Prescriptions Last Dose Informant Patient Reported? Taking?   HYDROmorphone (DILAUDID) 2 MG tablet   Yes No   Sig: Take 2-4 mg by mouth.   LORazepam (ATIVAN) 0.5 MG tablet   No No   Sig: Take 2 tablets (1 mg) by mouth every 6 hours as needed for nausea or vomiting.   OLANZapine (ZYPREXA) 2.5 MG tablet   No No   Sig: Take 2 tablets (5 mg) by mouth at bedtime.   benzonatate (TESSALON) 100 MG capsule   Yes No   Sig: Take 100 mg by mouth.   ibuprofen (ADVIL/MOTRIN) 200 MG capsule   Yes No   lidocaine-prilocaine (EMLA) 2.5-2.5 % external cream   Yes No   Sig: APPLY TO PORT-A-CATH SITE ONE HOUR PRIOR TO NEEDLE ACCESS.   ondansetron (ZOFRAN ODT) 8 MG ODT tab   No No   Sig: Take 1 tablet (8 mg) by mouth every 8 hours as needed for nausea.   ondansetron (ZOFRAN) 8 MG tablet   Yes No   Sig: Take 8 mg by mouth.   polyethylene glycol (MIRALAX) 17 g packet   Yes No   Sig: Take 17 g by mouth daily as needed.   prochlorperazine (COMPAZINE) 10 MG tablet   Yes No   Sig: Take 1 tablet by mouth every 6 hours as needed.   senna (SENOKOT) 8.6 MG tablet   Yes No   Sig: Take 2 tablets by mouth.      Facility-Administered Medications: None        Physical Exam   Vital Signs: Temp: 98.5  F (36.9  C) Temp src: Oral BP: 115/75 Pulse: 100   Resp: 16 SpO2: 95 % O2 Device: None (Room air)    Weight: 0 lbs 0 oz  GENERAL: Alert and oriented x 3. Cachectic. Cooperative.   HEENT: Anicteric sclera. Mucous membranes moist. NC. " AT.   CV: RRR. S1, S2. No murmurs appreciated.   RESPIRATORY: Effort normal on RA. Lungs CTAB with no wheezing, rales, rhonchi.   GI: Abdomen soft and non distended with normoactive bowel sounds present in all quadrants. No tenderness  NEUROLOGICAL: No focal deficits. Moves all extremities.    EXTREMITIES: No peripheral edema.  SKIN: No jaundice. No rashes.        Medical Decision Making       75 MINUTES SPENT BY ME on the date of service doing chart review, history, exam, documentation & further activities per the note.      Data   Recent Labs   Lab 07/14/25  1356 07/14/25  1058   WBC  --  8.0   HGB  --  9.7*   MCV  --  76*   PLT  --  506*   INR  --  1.36*   * 129*   POTASSIUM 3.7 3.8   CHLORIDE 95* 89*   CO2 19* 19*   BUN 5.2* 6.4   CR 0.41* 0.45*   ANIONGAP 17* 21*   EBONIE 8.3* 9.3   GLC 80 84   ALBUMIN  --  3.7   PROTTOTAL  --  7.4   BILITOTAL  --  0.5   ALKPHOS  --  174*   ALT  --  12   AST  --  57*   LIPASE  --  23

## 2025-07-15 NOTE — PLAN OF CARE
Goal Outcome Evaluation:      Plan of Care Reviewed With: patient    Overall Patient Progress: improvingOverall Patient Progress: improving    Admission Note    Reason for admission: metabolic acidosis and starvation ketoacidosis  Primary team notified of pt arrival. Yes  Admitted from: [facility]  Via: stretcher  Accompanied by: None  Belongings: Placed in closet; valuables ( medication and wallet) sent home with family.  Admission Required Doc Completed: Yes  Mobility Devices Utilized by Patient Provided (i.e. walker, wheelchair, etc.): NA  Teaching: Orientation to unit and call light- call light within reach, use of console, meal times, when to call for the RN, and enforced importance of safety. Yes  IV Access: R- Central port accessed  Telemetry: No  Ht./Wt.: Completed  Code Status verified on armband: Yes  2 RN Skin Assessment Completed with: Yes, Skin WDL  Suction/Ambu bag/Flowmeter at bedside: Yes    Pt status: Pt received in a stable G/c, on RA. Alert and oriented x 4, declined pain/ SOB.      Temp:  [98.5  F (36.9  C)-98.9  F (37.2  C)] 98.9  F (37.2  C)  Pulse:  [100-112] 112  Resp:  [16-18] 18  BP: (114-115)/(71-80) 114/80  SpO2:  [95 %-98 %] 98 %

## 2025-07-15 NOTE — PROCEDURES
Small Bowel Feeding Tube Placement Assessment  Reason for Feeding Tube Placement: small bore enteral access, gastric position acceptable  Cortrak Start Time: 1300   Cortrak End Time: 1315  Medicine Delivered During Procedure: lubricating gel  Placement Successful: yes per Cortrak tracing pending AXR confirmation      Procedure Complications: none  Final Placement Raciel at exit of nare:  59 cm  Face to Face time with patient: 15 minutes      Bridle Placement:   Reason for bridle placement: securement of nasogastric feeding tube   Medicine delivered during procedure: lubricating jelly  Procedure: Successful   Location of top of clip on FT: @ 60 cm marker   Condition of nose/skin at time of bridle placement: Unremarkable   Face to Face time with patient: < 5 minutes.      Michelle MOTTA  Clinical Dietitian  [facility] 5B/10A ICU Vocera, Teams, or desk [phone]  Weekend/Holiday Vocera: Weekend Holiday Clinical Dietitian [Multi Site Groups]     Advancement Flap (Double) Text: The defect edges were debeveled with a #15 scalpel blade.  Given the location of the defect and the proximity to free margins a double advancement flap was deemed most appropriate.  Using a sterile surgical marker, the appropriate advancement flaps were drawn incorporating the defect and placing the expected incisions within the relaxed skin tension lines where possible.    The area thus outlined was incised deep to adipose tissue with a #15 scalpel blade.  The skin margins were undermined to an appropriate distance in all directions utilizing iris scissors.

## 2025-07-15 NOTE — PHARMACY-CONSULT NOTE
Pharmacy Tube Feeding Consult    Medication reviewed for administration by feeding tube and for potential food/drug interactions.    Recommendation: No changes are needed at this time. Patient can take medications by mouth if needed. Did adjust orders to say oral or feeding tube that can go down the feeding tube.     Pharmacy will continue to follow as new medications are ordered.    Ina Lucas, TracieD, BCPS

## 2025-07-15 NOTE — PHARMACY-ADMISSION MEDICATION HISTORY
Pharmacist Admission Medication History    Admission medication history is complete. The information provided in this note is only as accurate as the sources available at the time of the update.    Information Source(s): Patient and CareEverywhere/SureScripts via in-person    Pertinent Information: none    Changes made to PTA medication list:  Added: omeprazole (per pt, fill hx), dose details for hydromorphone, ibuprofen, senna  Deleted: benzonatate (per pt), lorazepam (per pt), duplicate ondansetron entry  Changed: Miralax from daily PRN to BID PRN (per pt)    Allergies reviewed with patient and updates made in EHR: yes    Medication History Completed By: Ina Lucas [facility] 7/15/2025 11:27 AM    PTA Med List   Medication Sig Last Dose/Taking    HYDROmorphone (DILAUDID) 2 MG tablet Take 2-4 mg by mouth 3 times daily as needed for pain. 7/14/2025 Morning    ibuprofen (ADVIL/MOTRIN) 200 MG tablet Take 200 mg by mouth every 4 hours as needed for pain. Taking As Needed    lidocaine-prilocaine (EMLA) 2.5-2.5 % external cream APPLY TO PORT-A-CATH SITE ONE HOUR PRIOR TO NEEDLE ACCESS. Taking    OLANZapine (ZYPREXA) 2.5 MG tablet Take 2 tablets (5 mg) by mouth at bedtime. 7/13/2025 Evening    omeprazole (PRILOSEC) 20 MG DR capsule Take 20 mg by mouth daily. 7/14/2025 Morning    ondansetron (ZOFRAN ODT) 8 MG ODT tab Take 1 tablet (8 mg) by mouth every 8 hours as needed for nausea. 7/14/2025 Morning    polyethylene glycol (MIRALAX) 17 g packet Take 17 g by mouth 2 times daily as needed for constipation. Past Week    prochlorperazine (COMPAZINE) 10 MG tablet Take 1 tablet by mouth every 6 hours as needed for nausea or vomiting. Past Month    senna (SENOKOT) 8.6 MG tablet Take 2 tablets by mouth 2 times daily as needed for constipation. Past Week

## 2025-07-15 NOTE — CONSULTS
Solid Tumor Oncology  Consult Note   Date of Service: 07/15/2025    Patient: Roula Darling  MRN: [id number]  Admission Date: 7/14/2025  Hospital Day # 1  Cancer Diagnosis: Rectal Cancer   Primary Outpatient Oncologist: Kiki Hernandez   Current Treatment Plan: Research trial [id number]     Reason for Consult: FTT for cancer patient on Clinical trial       History of Present Illness:    Roula Darling is a 50 year old year old female with past medical history of stage IV metastatic rectal carcinoma who presented to the ED from her oncology appointment due to acidosis and concern for malnutrition and was admitted for failure to thrive.  On interview, she reports a weight loss of about 30 pounds total over the last 6 months.  She has had progressively worsening appetite and has generally only been able to tolerate a few bites of food at a time.  She notes that she does not feel nauseous, but that she does feel full all the time and that takes almost no food for her to feel full but she does not eat much.  Water hitting the back of her throat will often cause her to vomit.  She reports that she does vomit about twice per day.  The poor oral intake has been present for many months, and has not notably worsened recently since starting the clinical trial.     Research progress note from day of admission reports that the patient was feeling full after a nibble of toast, and began moaning after a few bites of jello. Her performance status at the start of the trial was assessed at ECOG 1, however shortly into the trial her performance status was noted to be ECOG 2-3    Prior to starting the clinical trial, she completed a CT scan which showed notable progression of her cancer with marked metastasis throughout the lungs and liver.    On interview today, she reports that she would be able to walk around the hospital unit, though slowly, that she would be able to walk up 1-2 flights of stairs, also slowly.  She reports  that she would be able to carry a gallon of milk but it would be difficult and she would prefer to use a cart rather than carry it.    Review of systems is negative for abdominal pain, bloating, distention, diarrhea, dysuria.      Oncologic History:    She was diagnosed with stage IV rectal carcinoma he is in early 2023, and received palliative radiation at [address] to the pelvis.  From February 22 to May 1 she received FOLFOX chemotherapy every 2 weeks, and then from May 1 she received only 5 fluorouracil due to a reaction to oxaliplatin that necessitated the use of an EpiPen.  She failed oxaliplatin desensitization on 5/8/2023.  From 5/22/2023 to 1/16/2024, she received FOLFIRI with G-CSF support every 2 weeks however on this regimen she had progressive disease in the lungs.  From February 6 to April 2, 2024 she received Lonsurf with bevacizumab.  From April 20 to May 20, she received regorafinib.  in June 2024, she began the clinical trial of XTX-301.  In August 2024 she was desensitized for oxaliplatin, and then received FOLFOX until February 2025, after which she received 5-fluorouracil alone until March.  From April through June she received Fruqintinib, and then on 6/30/2025, she began a clinical trial of T ORL-1-23 [facility] She received 1 dose at 2.4 mg/kg for a total of 127 mg on 6/30/25.        Review of Systems:  A comprehensive ROS was performed and found to be negative or non-contributory with the exception of that noted in the HPI above.    Assessment & Plan:   Roula Darling is a 50 year old female with past medical history of stage IV metastatic rectal carcinoma who was admitted for malnutrition, ketosis, and failure to thrive in the setting of metastatic cancer.    On assessment, her performance status remains adequate, though on interview she is concerned about her overall prognosis.  She is worried that she is declining and questions how much time she has left.  She is willing to receive  nutritional support at this time and thinks it is worth a try.  She is curious about her disease and whether or not is progressing.  Interview suggest that her performance status remains high, estimated at ECOG 2-3, though OT PT assessment will be necessary to determine overall functional status.  At this time it would be beneficial to assess disease burden with CT chest abdomen pelvis.  This imaging would be used to make further decisions regarding direction of care.  Otherwise most important at this time is to continue aggressive nutritional support and resuscitation.    Recommendations:   - Continue nutritional support, agree with PT/OT assessment. Goal is to determine and improve overall functional status  - Oncology will order a CT-CAP to assess for disease progression or other pathology that may explain the patient's anorexia and vomiting.       Oncology will continue to follow this patient. Please do not hesitate to page with any questions or concerns.    Patient was seen and recommendations discussed with attending physician, Dr. Mann.    Miller Stanford MD-PhD  PSTP- PGY - 2        Past Medical History:  Past Medical History:   Diagnosis Date    Asthma     High school;  now resolved    Colorectal cancer (H)        Past Surgical History:  Past Surgical History:   Procedure Laterality Date    IR CHEST PORT PLACEMENT > 5 YRS OF AGE  2023    URINARY SURGERY      as a child       Social History:  Social History     Socioeconomic History    Marital status:    Tobacco Use    Smoking status: Former     Current packs/day: 0.00     Types: Cigarettes     Quit date:      Years since quittin.5    Smokeless tobacco: Never   Substance and Sexual Activity    Drug use: Yes     Comment: Occasional gummies     Social Drivers of Health     Financial Resource Strain: Low Risk  (2025)    Financial Resource Strain     Within the past 12 months, have you or your family members you live with been  unable to get utilities (heat, electricity) when it was really needed?: No   Food Insecurity: Low Risk  (7/14/2025)    Food Insecurity     Within the past 12 months, did you worry that your food would run out before you got money to buy more?: No     Within the past 12 months, did the food you bought just not last and you didn t have money to get more?: No   Transportation Needs: Low Risk  (7/14/2025)    Transportation Needs     Within the past 12 months, has lack of transportation kept you from medical appointments, getting your medicines, non-medical meetings or appointments, work, or from getting things that you need?: No   Interpersonal Safety: Low Risk  (7/14/2025)    Interpersonal Safety     Do you feel physically and emotionally safe where you currently live?: Yes     Within the past 12 months, have you been hit, slapped, kicked or otherwise physically hurt by someone?: No     Within the past 12 months, have you been humiliated or emotionally abused in other ways by your partner or ex-partner?: No   Housing Stability: Low Risk  (7/14/2025)    Housing Stability     Do you have housing? : Yes     Are you worried about losing your housing?: No        Family History:  Family History   Problem Relation Age of Onset    Melanoma Maternal Grandmother     Cancer Paternal Grandmother     Lung Cancer Paternal Grandfather        Outpatient Medications:  Current Facility-Administered Medications   Medication Dose Route Frequency Provider Last Rate Last Admin    benzonatate (TESSALON) capsule 100 mg  100 mg Oral TID PRN Rosette Mckeon PA-C        calcium carbonate (TUMS) chewable tablet 1,000 mg  1,000 mg Oral 4x Daily PRN Rosette Mckeon PA-C        heparin lock flush 10 unit/mL injection 5-10 mL  5-10 mL Intracatheter Q1H PRN Lonnie Dubose MD   5 mL at 07/15/25 0610    heparin lock flush 10 unit/mL injection 5-10 mL  5-10 mL Intracatheter Q24H Lonnie Dubose MD   5 mL at 07/15/25 0447    heparin lock flush 100  unit/mL injection 5-10 mL  5-10 mL Intracatheter Q28 Days Lonnie Dubose MD        HYDROmorphone (DILAUDID) tablet 2 mg  2 mg Oral Q4H PRN Clovis Monge MD   2 mg at 07/15/25 0446    Or    HYDROmorphone (DILAUDID) tablet 4 mg  4 mg Oral Q4H PRN Clovis Monge MD        lidocaine (LMX4) cream   Topical Q1H PRN Rosette Mckeon PA-C        lidocaine 1 % 0.1-1 mL  0.1-1 mL Other Q1H PRN Rosette Mckeon PA-C        LORazepam (ATIVAN) tablet 0.5 mg  0.5 mg Oral Q6H PRN Rosette Mckeon PA-C        naloxone (NARCAN) injection 0.2 mg  0.2 mg Intravenous Q2 Min PRN Clovis Monge MD        Or    naloxone (NARCAN) injection 0.4 mg  0.4 mg Intravenous Q2 Min PRN Clovis Monge MD        Or    naloxone (NARCAN) injection 0.2 mg  0.2 mg Intramuscular Q2 Min PRN Clovis Monge MD        Or    naloxone (NARCAN) injection 0.4 mg  0.4 mg Intramuscular Q2 Min PRN Clovis Monge MD        OLANZapine (zyPREXA) tablet 5 mg  5 mg Oral At Bedtime Rosette Mckeon PA-C   5 mg at 07/14/25 2347    ondansetron (ZOFRAN ODT) ODT tab 8 mg  8 mg Oral Q8H PRN Rosette Mckeon PA-C   8 mg at 07/14/25 2313    polyethylene glycol (MIRALAX) Packet 17 g  17 g Oral Daily PRN Rosette Mckeon PA-C        prochlorperazine (COMPAZINE) tablet 10 mg  10 mg Oral Q6H PRN Rosette Mckeon PA-C        senna-docusate (SENOKOT-S/PERICOLACE) 8.6-50 MG per tablet 1 tablet  1 tablet Oral BID PRN Rosette Mckeon PA-C        Or    senna-docusate (SENOKOT-S/PERICOLACE) 8.6-50 MG per tablet 2 tablet  2 tablet Oral BID PRN Rosette Mckeon PA-C        sodium chloride (PF) 0.9% PF flush 10-20 mL  10-20 mL Intracatheter q1 min prn Lonnie Dubose MD   10 mL at 07/15/25 0447    sodium chloride (PF) 0.9% PF flush 10-20 mL  10-20 mL Intracatheter Q1H PRN Lonnie Dubose MD   20 mL at 07/15/25 0611    sodium chloride (PF) 0.9% PF flush 10-20 mL  10-20 mL Intracatheter Q28 Days Lonnie Dubose MD        sodium chloride (PF)  "0.9% PF flush 3 mL  3 mL Intracatheter Q8H [facility] Rosette Mckeon, MARY   3 mL at 07/14/25 1861        Physical Exam:    Blood pressure 124/83, pulse (!) 122, temperature 98.5  F (36.9  C), temperature source Oral, resp. rate 16, height 1.645 m (5' 4.76\"), weight 49.4 kg (109 lb), SpO2 98%.  General: alert and cooperative, lying in bed, no acute distress, non-cachectic appearing  HEENT: sclera anicteric, EOMI, MMM  Neck: supple, normal ROM  MSK: warm and well-perfused, normal tone  Skin: no rashes on limited exam, no jaundice  Neuro: Alert and interactive, moves all extremities equally, no focal deficits    Labs & Studies: I personally reviewed the following studies:  ROUTINE LABS (Last four results):  CMP  Recent Labs   Lab 07/15/25  0608 07/14/25  1356 07/14/25  1058   * 131* 129*   POTASSIUM 3.6 3.7 3.8   CHLORIDE 94* 95* 89*   CO2 19* 19* 19*   ANIONGAP 19* 17* 21*   GLC 87 80 84   BUN 3.4* 5.2* 6.4   CR 0.39* 0.41* 0.45*   GFRESTIMATED >90 >90 >90   EBONIE 8.3* 8.3* 9.3   MAG 1.9  --  1.9   PHOS 2.8  --  3.2   PROTTOTAL 6.7  --  7.4   ALBUMIN 3.2*  --  3.7   BILITOTAL 0.4  --  0.5   ALKPHOS 163*  --  174*   AST 67*  --  57*   ALT 13  --  12     CBC  Recent Labs   Lab 07/15/25  0608 07/14/25  1058   WBC 7.6 8.0   RBC 3.53* 4.01   HGB 8.7* 9.7*   HCT 27.4* 30.5*   MCV 78 76*   MCH 24.6* 24.2*   MCHC 31.8 31.8   RDW 18.5* 18.6*    506*     INR  Recent Labs   Lab 07/14/25  1058   INR 1.36*       "

## 2025-07-15 NOTE — PLAN OF CARE
Goal Outcome Evaluation:      Plan of Care Reviewed With: patient    Overall Patient Progress: improvingOverall Patient Progress: improving       VS: /80 (BP Location: Right arm)   Pulse 112   Temp 98.9  F (37.2  C) (Oral)   Resp 18   SpO2 98%    O2: SpO2 > 98 and stable on RA. LS clear and equal bilaterally. Denies chest pain and SOB.    Output: Voids spontaneously without difficulty to bathroom.   Last BM: 7/11, denies abdominal discomfort. BS active / passing flatus. Declined bowel meds and will try them in them later during the day.    Activity: Pt independent with mobility   Skin: WDL except, Central port access.    Pain: Lower back pain managed with PRN dilaudid this morning.    CMS: Aox4, declined numbness and tingling.   Dressing: Central line dressing is CDI   Diet: Regular diet. Pt had nausea/vomiting. Pt with poor appetite.    LDA: Accessed cental port.   Equipment: IV pole, personal belongings,    Plan: Fall precautions maintained / Continue with plan of care. Call light within reach, pt able to make needs known. No bed alarm and safety checks completed.    Additional Info: PRN Zofran used at admission due to vomiting/ nausea and was effective.   Lab sample sent to lab this morning.   Ketone quantitative at 3.5, physician informed.

## 2025-07-15 NOTE — ADDENDUM NOTE
Encounter addended by: Holly Ling on: 7/15/2025 12:24 PM   Actions taken: Charge Capture section accepted

## 2025-07-15 NOTE — PROGRESS NOTES
[facility]    Medicine Progress Note - Hospitalist Service, GOLD TEAM 22    Date of Admission:  7/14/2025    Assessment & Plan    Roula Darling is a 50 year old female with a past medical history of stage IV metastatic rectal adenocarcinoma who presents to the ED from her oncology clinic appointment with lactic acidosis and concerns for malnutrition.  She is now admitted to internal medicine as below:     Changes today:  - Saw patient at bedside along with oncology team  - all in agreement that initiating tube feeding for optimization of nutrition is appropriate at this time while oncology team meets and determines if her current presentation is concerning for possible research drug side effect vs progression of disease, vs just expected complication of poor PO intake iso persistent nausea for months plus high metabolic demand iso metastatic disease  - placing NGT, starting tube feeds with nutrition guidance  - monitoring electrolytes q12h for refeeding risk for the next 1-2 days    Failure to Thrive  Anion Gap Metabolic Acidosis   Starvation Ketoacidosis   At Risk for Refeeding Syndrome   Hypovolemic Hyponatremia  Patient presenting from outpatient clinic appointment d/t anorexia.  Treatment deferred today due deconditioning.  Lactic acid WNLs, however with ketosis and anion gap metabolic acidosis. S/p 2Ls NS in clinic, receiving 1L NS per ED.  Suspect starvation ketoacidosis in setting of metastatic cancer.   - nutrition consult   - placing NGT, starting tube feeds with nutrition guidance  - cont thiamine, folate, and multivitamins  - high protein, high kcal diet  - monitoring electrolytes q12h for refeeding risk for the next 1-2 days    Stage IV Metastatic Rectal Adenocarcinoma - [facility]. [email or web address], follows with Dr. Deleon with [facility] for TOR clinical trial. Please refer to outpatient note 7/8/25 for full treatment history.   -Oncology  consult while inpatient  -Antiemetics:   -Zofran   -compazine   -ativan  -continue PRN hydromorphone  -consider palliative care consult pending above interventions/discussions             Diet: High Kcal/High Protein Diet, ADULT    DVT Prophylaxis: Pneumatic Compression Devices  Arambula Catheter: Not present  Lines: PRESENT      Port a Cath 02/17/23 Single Lumen Right Chest wall-Site Assessment: WDL      Cardiac Monitoring: None  Code Status: No CPR- Do NOT Intubate      Clinically Significant Risk Factors Present on Admission         # Hyponatremia: Lowest Na = 129 mmol/L in last 2 days, will monitor as appropriate  # Hypochloremia: Lowest Cl = 89 mmol/L in last 2 days, will monitor as appropriate  # Hypocalcemia: Lowest Ca = 8.3 mg/dL in last 2 days, will monitor and replace as appropriate    # Anion Gap Metabolic Acidosis: Highest Anion Gap = 21 mmol/L in last 2 days, will monitor and treat as appropriate  # Hypoalbuminemia: Lowest albumin = 3.2 g/dL at 7/15/2025  6:08 AM, will monitor as appropriate    # Coagulation Defect: INR = 1.36 (Ref range: 0.85 - 1.15) and/or PTT = N/A, will monitor for bleeding         # Anemia: based on hgb <11                  Social Drivers of Health    Depression: At risk (6/10/2025)    PHQ-2     PHQ-2 Score: 3   Tobacco Use: Medium Risk (7/8/2025)    Received from [facility]    Patient History     Smoking Tobacco Use: Former     Smokeless Tobacco Use: Never          Disposition Plan     Medically Ready for Discharge: Anticipated in 2-4 Days             Sha Linder MD  Hospitalist Service, GOLD TEAM 22  [facility]  Securely message with [employer] (more info)  Text page via [facility] Paging/Directory   See signed in provider for up to date coverage information  ______________________________________________________________________    Interval History   Admitted overnight, taking minimal PO. A few bites of muffin. One cup of water.      Physical Exam   Vital Signs: Temp: 98.5  F (36.9  C) Temp src: Oral BP: 124/83 Pulse: (!) 122   Resp: 16 SpO2: 98 % O2 Device: None (Room air)    Weight: 109 lbs 0 oz      Gen: cachectic, chronically ill-appearing. Pale. No acute distress, alert  CV: sinus tach 120, no murmurs  Pulm: no increased WOB  Abd: soft, nl BS  Extremities: no edema      Medical Decision Making       75 MINUTES SPENT BY ME on the date of service doing chart review, history, exam, documentation & further activities per the note.      Data     I have personally reviewed the following data over the past 24 hrs:    7.6  \   8.7 (L)   / 443     132 (L) 94 (L) 3.4 (L) /  81   3.6 19 (L) 0.39 (L) \     ALT: 13 AST: 67 (H) AP: 163 (H) TBILI: 0.4   ALB: 3.2 (L) TOT PROTEIN: 6.7 LIPASE: N/A     Procal: N/A CRP: N/A Lactic Acid: 0.9       Ferritin:  N/A % Retic:  N/A LDH:  1,404 (H)       Imaging results reviewed over the past 24 hrs:   Recent Results (from the past 24 hours)   XR Abdomen 2 Views    Narrative    Exam: XR ABDOMEN 2 VIEWS, 7/15/2025 7:33 AM    Indication: rectal cancer, constipation, evaluate stool burden and for  obstruction    Comparison: 6/20/2025    Findings:   Upright and supine AP views of the abdomen were obtained.  Nonobstructive bowel gas pattern. Mild gaseous distention of the  colon. No pneumatosis, portal venous gas, or free air. Small stool  burden. Redemonstration of numerous pulmonary nodules/masses. No acute  fracture visualized.      Impression    Impression:   Mild nonobstructive gaseous distention of the colon. Small stool  burden.    AMIE DRAPER MD         SYSTEM ID:  [id number]

## 2025-07-15 NOTE — PROGRESS NOTES
CLINICAL NUTRITION SERVICES - ASSESSMENT NOTE    RECOMMENDATIONS FOR MDs/PROVIDERS TO ORDER:  Recommend adding miralax as scheduled as she generally requires.  Last BM last Friday.   Anticipate need for advancement of NG to post pyloric position w/ issues w/ n/v.    Malnutrition Status:    Severe malnutrition in the context of acute on chronic illness    Registered Dietitian Interventions:  --Enteral access: currently nasogastric  --Xray confirmed gastric placement  --GOAL: Jevity 1.5 Earl (or equivalent) @ goal of  45ml/hr  (1080ml/day) provides: 1620 kcals, 68 g PRO, 820 ml free H20, 232 g CHO, and 22 g fiber daily.   --Start TF @ 15 ml/hr and advance by 10 ml q 12 hrs until goal rate.  --Do not start or advance TF rate unless K+ >3.0, Mg++ > 1.5,  and Phos > 1.9.  --FWF of 75 ml q 4 hr---will provide 450 ml + 820 ml free water from goal TF for total of 1270 ml (26 ml/kg) plus Rx flushes.   --HOB > 30 degrees w/ gastric feedings.   -Thiamine 100 mg x 7 days  - Liquid mulitivit w/ minerals   - Removed allergy for Red dye # 40 after careful review w/ pt (does not avoid food dyes and reports her allergist does not believe this was a true allergy).     Future/Additional Recommendations:  Monitor labs, stooling, weight trends, enteral/parenteral support tolerance     REASON FOR ASSESSMENT  Positive nutrition admission screen and Provider Order - poor oral intake, concern for need for tube feedings and Registered Dietitian to order TF per Medical Nutrition Therapy Guidelines     REASON FOR ADMISSION:  Presented to the ED from her oncology clinic appointment with lactic acidosis and concerns for malnutrition.     PMH:   Stage 4 Metastatic rectal adenocarcinoma with mets throughout the lungs and liver.  Follows with Dr. Hernandez at [facility], follows with Dr. Deleon with [facility] for TOR clinical trial (since 6/2025 per [facility] note).     SUBJECTIVE INFORMATION  Assessed patient in room.   at  bedside    NUTRITION HISTORY  Pt has had minimal intake for weeks, decreasing over months.  She only ate part of a small bag of chex party mix yesterday.  Last BM was last Friday per pt but x-ray noted only small stool burden as evidence of her minimal intake.  She dislikes supplement drinks and noted early satiety w/ them even years ago.  She may throw up twice a day but w/ minimal intake.      CURRENT NUTRITION ORDERS  Diet: High Kcal/High Protein    CURRENT INTAKE/TOLERANCE  Intake documented as 25%. No appetite.  Continued nausea.      LABS  Nutrition-relevant labs: Reviewed   Latest Reference Range & Units 07/14/25 10:58 07/14/25 13:56 07/14/25 18:05 07/15/25 06:08   Sodium 135 - 145 mmol/L 129 (L) 131 (L)  132 (L)   Potassium 3.4 - 5.3 mmol/L 3.8 3.7  3.6   Chloride 98 - 107 mmol/L 89 (L) 95 (L)  94 (L)   Carbon Dioxide (CO2) 22 - 29 mmol/L 19 (L) 19 (L)  19 (L)   Urea Nitrogen 6.0 - 20.0 mg/dL 6.4 5.2 (L)  3.4 (L)   Creatinine 0.51 - 0.95 mg/dL 0.45 (L) 0.41 (L)  0.39 (L)   Anion Gap 7 - 15 mmol/L 21 (H) 17 (H)  19 (H)   Magnesium 1.7 - 2.3 mg/dL 1.9   1.9   Phosphorus 2.5 - 4.5 mg/dL 3.2   2.8   Alkaline Phosphatase 40 - 150 U/L 174 (H)   163 (H)   ALT 0 - 50 U/L 12   13   AST 0 - 45 U/L 57 (H)   67 (H)   Ferritin 6 - 175 ng/mL 598 (H)      Glucose 70 - 99 mg/dL 84 80  87   Iron 37 - 145 ug/dL 13 (L)      Iron Binding Capacity 240 - 430 ug/dL 162 (L)      Iron Sat Index 15 - 46 % 8 (L)      Ketone Quantitative <=0.30 mmol/L   3.46 (HH) 3.50 (HH)   Lactic Acid 0.7 - 2.0 mmol/L   0.9    Lactate Dehydrogenase 0 - 250 U/L 1,487 (H)   1,404 (H)   Osmolality 275 - 295 mmol/kg  269 (L)     Sodium Urine mmol/L mmol/L 35      Uric Acid 2.4 - 5.7 mg/dL 6.3 (H)   5.9 (H)   Urine Osmolality 100 - 1,200 mmol/kg 441      Ketones Urine Negative mg/dL 100 !      (HH): Data is critically high  (L): Data is abnormally low  (H): Data is abnormally high  !: Data is abnormal    MEDICATIONS  Nutrition-relevant medications:  "Reviewed  Received one dose of Folic acid, multvit w/ minerals and IV thiamine.  Zyprexa  Protonix  PRN: miralax, compazine, zofran, senna-docusate,      ANTHROPOMETRICS  Height: 164.5 cm (5' 4.764\")  Admission Weight: 50.6 kg (111 lb 8 oz) (07/14/25 2241)   Most Recent Weight: 49.4 kg (109 lb) (07/15/25 0622)  IBW: 56 kg  % IBW: 88%  Body mass index is 18.27 kg/m .  BMI (kg/m ): Underweight BMI < 18.5  UBW is about 145 lb per pt   Weight History:   Wt Readings from Last 15 Encounters:   07/15/25 49.4 kg (109 lb)   07/14/25 49.2 kg (108 lb 7.5 oz)   07/07/25 51.4 kg (113 lb 5.1 oz)   07/03/25 52.1 kg (114 lb 13.8 oz)   07/01/25 53.2 kg (117 lb 4.6 oz)   06/30/25 52.9 kg (116 lb 10 oz)   06/10/25 56.1 kg (123 lb 11.2 oz)   04/25/25 59.9 kg (132 lb) clinic   03/10/25 61.7 kg (136 lb)   02/19/25 62.9 kg (138 lb 11.2 oz)   01/22/25 64.9 kg (143 lb)   09/06/24 64.4 kg (142 lb)   08/20/24 66.5 kg (146 lb 9.6 oz) last wt of stay [facility]   07/18/24 68.9 kg (152 lb) [facility] oncology visit     Weight assessment:  Wt loss of 4.3% in 1 week.  Wt loss of 6.6% in 1 month  Wt loss of 17.5% in < 3 months  Wt loss of 23.9% in 6 months  Wt loss of 30% in 1 year.     Dosing Weight: 49.4 kg, based on Actual Body Weight    ASSESSED NUTRITION NEEDS  Estimated Energy Needs: 1482 -1729 +  kcals/day (30 - 35 kcals/kg)  Justification: Increased needs and Underweight  Estimated Protein Needs: 64 - 74  grams protein/day (1.3 - 1.5 grams of pro/kg)  Justification: Increased needs and Repletion  Estimated Fluid Needs: 1482 - 1729 mL/day (30 - 35 mL/kg)  Justification: w/ high protein load and potential future chemo w/ caution pending potential fluid shifts w/ refeeding w/ severe malnutrition.    SYSTEM FINDINGS    Pale. Nauseated so did not request to look in mouth or show tongue.    Skin/wounds: No significant skin impairments noted    GI symptoms: Nausea  Last BM: Last Friday per pt (xray shows only small stool burden this " "admission)  Bowel regimen: Scheduled  Enteral access: NGT placed earlier today by RD via cortrak as [facility] flyer RN not trained.    RENAL  No acute concerns aside from anion gap metabolic acidosis.    MALNUTRITION  % Intake: </=50% for >/= 1 month (severe)  % Weight Loss: > 20% in 1 year (severe)   Subcutaneous Fat Loss: Orbital: Moderate  Muscle Loss: Temples (temporalis muscle): Moderate, Clavicles (pectoralis and deltoids): Severe, Shoulders (deltoids): Severe, Interosseous muscles: Severe, and Calf (gastrocnemius): Severe  Fluid Accumulation/Edema: None noted  Malnutrition Diagnosis: Severe malnutrition in the context of acute on chronic illness  Malnutrition Present on Admission: Yes    NUTRITION DIAGNOSIS  Inadequate oral intake related to early satiety, n/v and likely high needs w/ metastatic Ca as evidenced by very low BMI, severe wt loss, moderate to severe fat and muscle wasting.     INTERVENTIONS  See nutrition interventions above    Goals  Electrolytes WNL  Total avg nutritional intake to meet a minimum of 30 kcal/kg and 1.3 g PRO/kg daily (per dosing wt 49.4 kg).     Monitoring/Evaluation  Progress toward goals will be monitored and evaluated per policy.    Daksha Severino RD, LD   6 & 8 Med/Surg RD  Mon-Fri Vocera: \"6 Med Surg Clinical Dietitian\" or \"8 Med Surg Clinical Dietitian\"  Weekend RD Vocera (Global): \"Weekend Holiday Clinical Dietitian\"          "

## 2025-07-15 NOTE — PROGRESS NOTES
VS: See flowsheet     O2: >90% on RA with no complaints of SOB or chest pain.   Output: Voiding adequate amounts w/o pain or difficulty.   Last BM: 7/11/25   Activity: Independent    Skin: Intact   Pain: Denies   CMS: A/O x 4   Dressing: none   Diet: Continuous Jevity 1.5   LDA: NG tube, R port a cath   Equipment: Personal belongings   Plan: Continue POC   Additional Info: Jevity 1.5 continuous feeding started at 15ml/hr @ 1730.

## 2025-07-16 LAB
ANION GAP SERPL CALCULATED.3IONS-SCNC: 12 MMOL/L (ref 7–15)
ANION GAP SERPL CALCULATED.3IONS-SCNC: 14 MMOL/L (ref 7–15)
ATRIAL RATE - MUSE: 120 BPM
BUN SERPL-MCNC: 3 MG/DL (ref 6–20)
BUN SERPL-MCNC: 3.6 MG/DL (ref 6–20)
CALCIUM SERPL-MCNC: 8.2 MG/DL (ref 8.8–10.4)
CALCIUM SERPL-MCNC: 8.4 MG/DL (ref 8.8–10.4)
CHLORIDE SERPL-SCNC: 92 MMOL/L (ref 98–107)
CHLORIDE SERPL-SCNC: 94 MMOL/L (ref 98–107)
CREAT SERPL-MCNC: 0.37 MG/DL (ref 0.51–0.95)
CREAT SERPL-MCNC: 0.41 MG/DL (ref 0.51–0.95)
DIASTOLIC BLOOD PRESSURE - MUSE: NORMAL MMHG
EGFRCR SERPLBLD CKD-EPI 2021: >90 ML/MIN/1.73M2
EGFRCR SERPLBLD CKD-EPI 2021: >90 ML/MIN/1.73M2
GLUCOSE SERPL-MCNC: 109 MG/DL (ref 70–99)
GLUCOSE SERPL-MCNC: 115 MG/DL (ref 70–99)
HCO3 SERPL-SCNC: 23 MMOL/L (ref 22–29)
HCO3 SERPL-SCNC: 26 MMOL/L (ref 22–29)
INTERPRETATION ECG - MUSE: NORMAL
MAGNESIUM SERPL-MCNC: 1.8 MG/DL (ref 1.7–2.3)
MAGNESIUM SERPL-MCNC: 1.9 MG/DL (ref 1.7–2.3)
OSMOLALITY SERPL: 268 MMOL/KG (ref 275–295)
OSMOLALITY UR: 430 MMOL/KG (ref 100–1200)
P AXIS - MUSE: 54 DEGREES
PHOSPHATE SERPL-MCNC: 2.3 MG/DL (ref 2.5–4.5)
PHOSPHATE SERPL-MCNC: 2.5 MG/DL (ref 2.5–4.5)
POTASSIUM SERPL-SCNC: 3.2 MMOL/L (ref 3.4–5.3)
POTASSIUM SERPL-SCNC: 3.8 MMOL/L (ref 3.4–5.3)
POTASSIUM SERPL-SCNC: 3.8 MMOL/L (ref 3.4–5.3)
PR INTERVAL - MUSE: 154 MS
QRS DURATION - MUSE: 76 MS
QT - MUSE: 316 MS
QTC - MUSE: 446 MS
R AXIS - MUSE: 25 DEGREES
SODIUM SERPL-SCNC: 129 MMOL/L (ref 135–145)
SODIUM SERPL-SCNC: 132 MMOL/L (ref 135–145)
SODIUM UR-SCNC: 37 MMOL/L
SYSTOLIC BLOOD PRESSURE - MUSE: NORMAL MMHG
T AXIS - MUSE: 69 DEGREES
VENTRICULAR RATE- MUSE: 120 BPM

## 2025-07-16 PROCEDURE — 84300 ASSAY OF URINE SODIUM: CPT | Performed by: STUDENT IN AN ORGANIZED HEALTH CARE EDUCATION/TRAINING PROGRAM

## 2025-07-16 PROCEDURE — 80048 BASIC METABOLIC PNL TOTAL CA: CPT

## 2025-07-16 PROCEDURE — 82374 ASSAY BLOOD CARBON DIOXIDE: CPT | Performed by: STUDENT IN AN ORGANIZED HEALTH CARE EDUCATION/TRAINING PROGRAM

## 2025-07-16 PROCEDURE — 82310 ASSAY OF CALCIUM: CPT | Performed by: STUDENT IN AN ORGANIZED HEALTH CARE EDUCATION/TRAINING PROGRAM

## 2025-07-16 PROCEDURE — 84100 ASSAY OF PHOSPHORUS: CPT | Performed by: PHYSICIAN ASSISTANT

## 2025-07-16 PROCEDURE — 99233 SBSQ HOSP IP/OBS HIGH 50: CPT | Performed by: STUDENT IN AN ORGANIZED HEALTH CARE EDUCATION/TRAINING PROGRAM

## 2025-07-16 PROCEDURE — 93005 ELECTROCARDIOGRAM TRACING: CPT

## 2025-07-16 PROCEDURE — 83930 ASSAY OF BLOOD OSMOLALITY: CPT | Performed by: STUDENT IN AN ORGANIZED HEALTH CARE EDUCATION/TRAINING PROGRAM

## 2025-07-16 PROCEDURE — 250N000013 HC RX MED GY IP 250 OP 250 PS 637: Performed by: STUDENT IN AN ORGANIZED HEALTH CARE EDUCATION/TRAINING PROGRAM

## 2025-07-16 PROCEDURE — 258N000003 HC RX IP 258 OP 636: Performed by: STUDENT IN AN ORGANIZED HEALTH CARE EDUCATION/TRAINING PROGRAM

## 2025-07-16 PROCEDURE — 250N000013 HC RX MED GY IP 250 OP 250 PS 637

## 2025-07-16 PROCEDURE — 83735 ASSAY OF MAGNESIUM: CPT | Performed by: PHYSICIAN ASSISTANT

## 2025-07-16 PROCEDURE — 84132 ASSAY OF SERUM POTASSIUM: CPT

## 2025-07-16 PROCEDURE — 250N000013 HC RX MED GY IP 250 OP 250 PS 637: Performed by: INTERNAL MEDICINE

## 2025-07-16 PROCEDURE — 250N000013 HC RX MED GY IP 250 OP 250 PS 637: Performed by: NURSE PRACTITIONER

## 2025-07-16 PROCEDURE — 83935 ASSAY OF URINE OSMOLALITY: CPT | Performed by: STUDENT IN AN ORGANIZED HEALTH CARE EDUCATION/TRAINING PROGRAM

## 2025-07-16 PROCEDURE — 120N000002 HC R&B MED SURG/OB UMMC

## 2025-07-16 PROCEDURE — 99233 SBSQ HOSP IP/OBS HIGH 50: CPT | Mod: GC | Performed by: INTERNAL MEDICINE

## 2025-07-16 PROCEDURE — 250N000011 HC RX IP 250 OP 636: Performed by: STUDENT IN AN ORGANIZED HEALTH CARE EDUCATION/TRAINING PROGRAM

## 2025-07-16 RX ORDER — ONDANSETRON HYDROCHLORIDE 4 MG/5ML
8 SOLUTION ORAL EVERY 8 HOURS PRN
Status: DISCONTINUED | OUTPATIENT
Start: 2025-07-16 | End: 2025-07-18 | Stop reason: HOSPADM

## 2025-07-16 RX ORDER — ONDANSETRON 4 MG/1
8 TABLET, ORALLY DISINTEGRATING ORAL EVERY 8 HOURS PRN
Status: DISCONTINUED | OUTPATIENT
Start: 2025-07-16 | End: 2025-07-18 | Stop reason: HOSPADM

## 2025-07-16 RX ORDER — POTASSIUM CHLORIDE 750 MG/1
20 TABLET, EXTENDED RELEASE ORAL ONCE
Status: COMPLETED | OUTPATIENT
Start: 2025-07-16 | End: 2025-07-16

## 2025-07-16 RX ORDER — OMEPRAZOLE 20 MG/1
20 CAPSULE, DELAYED RELEASE ORAL
Status: DISCONTINUED | OUTPATIENT
Start: 2025-07-17 | End: 2025-07-18 | Stop reason: HOSPADM

## 2025-07-16 RX ORDER — POTASSIUM CHLORIDE 750 MG/1
40 TABLET, EXTENDED RELEASE ORAL ONCE
Status: COMPLETED | OUTPATIENT
Start: 2025-07-16 | End: 2025-07-16

## 2025-07-16 RX ADMIN — POTASSIUM & SODIUM PHOSPHATES POWDER PACK 280-160-250 MG 1 PACKET: 280-160-250 PACK at 18:56

## 2025-07-16 RX ADMIN — POTASSIUM CHLORIDE 40 MEQ: 750 TABLET, EXTENDED RELEASE ORAL at 10:57

## 2025-07-16 RX ADMIN — THIAMINE HCL TAB 100 MG 100 MG: 100 TAB at 07:59

## 2025-07-16 RX ADMIN — POTASSIUM & SODIUM PHOSPHATES POWDER PACK 280-160-250 MG 1 PACKET: 280-160-250 PACK at 23:45

## 2025-07-16 RX ADMIN — POTASSIUM CHLORIDE 20 MEQ: 750 TABLET, EXTENDED RELEASE ORAL at 21:03

## 2025-07-16 RX ADMIN — HEPARIN, PORCINE (PF) 10 UNIT/ML INTRAVENOUS SYRINGE 5 ML: at 05:17

## 2025-07-16 RX ADMIN — POLYETHYLENE GLYCOL 3350 17 G: 17 POWDER, FOR SOLUTION ORAL at 10:52

## 2025-07-16 RX ADMIN — OLANZAPINE 5 MG: 5 TABLET, FILM COATED ORAL at 21:03

## 2025-07-16 RX ADMIN — HYDROMORPHONE HYDROCHLORIDE 2 MG: 2 TABLET ORAL at 08:02

## 2025-07-16 RX ADMIN — SODIUM CHLORIDE 500 ML: 0.9 INJECTION, SOLUTION INTRAVENOUS at 09:26

## 2025-07-16 RX ADMIN — PANTOPRAZOLE SODIUM 40 MG: 40 TABLET, DELAYED RELEASE ORAL at 07:59

## 2025-07-16 RX ADMIN — Medication 15 ML: at 07:59

## 2025-07-16 RX ADMIN — HEPARIN, PORCINE (PF) 10 UNIT/ML INTRAVENOUS SYRINGE 5 ML: at 17:05

## 2025-07-16 ASSESSMENT — ACTIVITIES OF DAILY LIVING (ADL)
ADLS_ACUITY_SCORE: 28
ADLS_ACUITY_SCORE: 32
ADLS_ACUITY_SCORE: 30
ADLS_ACUITY_SCORE: 32
ADLS_ACUITY_SCORE: 30
ADLS_ACUITY_SCORE: 28
ADLS_ACUITY_SCORE: 28
ADLS_ACUITY_SCORE: 32
ADLS_ACUITY_SCORE: 28
ADLS_ACUITY_SCORE: 28
ADLS_ACUITY_SCORE: 32
ADLS_ACUITY_SCORE: 28
ADLS_ACUITY_SCORE: 32
ADLS_ACUITY_SCORE: 32
ADLS_ACUITY_SCORE: 28

## 2025-07-16 NOTE — PLAN OF CARE
"Goal Outcome Evaluation:      Plan of Care Reviewed With: patient    Overall Patient Progress: improvingOverall Patient Progress: improving    VS: /83 (BP Location: Right arm)   Pulse (!) 127   Temp 98.8  F (37.1  C) (Oral)   Resp 18   Ht 1.645 m (5' 4.76\")   Wt 49.4 kg (109 lb)   SpO2 98%   BMI 18.27 kg/m     O2: SpO2 > 98 and stable on RA. LS clear and equal bilaterally. Denies chest pain and SOB.    Output: Voids spontaneously without difficulty to bathroom.   Last BM: 7/11, denies abdominal discomfort. BS active / passing flatus.    Activity: Pt independent with mobility   Skin: WDL except, Central port access.    Pain: Lower back pain managed with PRN dilaudid overnight..    CMS: Aox4, declined numbness and tingling.   Dressing: Central line dressing is CDI   Diet: Pt on T-feeding increased to 25 ml/hr at 3 am, with water flushes 60 ml Q4H.   LDA: Accessed cental port, heparin locked.   Equipment: IV pole, personal belongings,    Plan: Fall precautions maintained / Continue with plan of care. Call light within reach, pt able to make needs known. No bed alarm and safety checks completed.    Additional Info: PCD on overnight, taken off at 5 am.   Ct scan chest/ abdomen and pelvic done awaits result.                  "

## 2025-07-16 NOTE — PROGRESS NOTES
[facility]    Medicine Progress Note - Hospitalist Service, GOLD TEAM 22    Date of Admission:  7/14/2025    Assessment & Plan    Roula Darling is a 50 year old female with a past medical history of stage IV metastatic rectal adenocarcinoma who presents to the ED from her oncology clinic appointment with lactic acidosis and concerns for malnutrition.  She is now admitted to internal medicine as below:     Changes today:  - on tube feeds per RD, advancing to goal  - BID refeeding labs, lytes repleted PRN  - hypoNa persists, suspect multifactorial but will give a small bolus NS to ensure hydration and decrease free water intake while uptitrating feeds to goal rate    Failure to Thrive  Severe protein-calorie malnutrition  Anion Gap Metabolic Acidosis   Starvation Ketoacidosis   At Risk for Refeeding Syndrome   Hypovolemic Hyponatremia  Patient presenting from outpatient clinic appointment d/t anorexia.  Treatment deferred today due deconditioning.  Lactic acid WNLs, however with ketosis and anion gap metabolic acidosis. S/p 2Ls NS in clinic, receiving 1L NS per ED.  Suspect starvation ketoacidosis in setting of metastatic cancer.   - nutrition consulted  - placing NGT, starting tube feeds with nutrition guidance  - cont thiamine, folate, and multivitamins  - high protein, high kcal diet  - monitoring electrolytes q12h for refeeding risk for the next 1-2 days    Stage IV Metastatic Rectal Adenocarcinoma - Follows with Dr. Hernandez at [facility], follows with Dr. Deleon with [facility] for TOR clinical trial. Please refer to outpatient note 7/8/25 for full treatment history.   -Oncology consult while inpatient  -Antiemetics:   -Zofran   -compazine   -ativan  -continue PRN hydromorphone  -consider palliative care consult pending above interventions/discussions     Hyponatremia  suspect multifactorial hypovolemic plus iatrogenic with feeds plus free water, but will give a  small bolus NS to ensure hydration and decrease free water intake while uptitrating feeds to goal rate  - bolus 500 ml NS  - limit free water to <1L  - BID BMP as above          Diet: High Kcal/High Protein Diet, ADULT  Adult Formula Drip Feeding: Continuous Jevity 1.5; Nasogastric tube; Goal Rate: 45; start @ 15 ml/hr, increase by 10 ml/hr q 12 hr (if meets refeeding electrolyte goals).; mL/hr; Add only 8 hr worth of form (8 x rate) to feeding bag (8 hr hang cyn...    DVT Prophylaxis: Pneumatic Compression Devices  Arambula Catheter: Not present  Lines: PRESENT      Port a Cath 02/17/23 Single Lumen Right Chest wall-Site Assessment: WDL      Cardiac Monitoring: None  Code Status: No CPR- Do NOT Intubate      Clinically Significant Risk Factors        # Hypokalemia: Lowest K = 3.2 mmol/L in last 2 days, will replace as needed  # Hyponatremia: Lowest Na = 129 mmol/L in last 2 days, will monitor as appropriate  # Hypochloremia: Lowest Cl = 92 mmol/L in last 2 days, will monitor as appropriate  # Hypocalcemia: Lowest Ca = 8.2 mg/dL in last 2 days, will monitor and replace as appropriate    # Anion Gap Metabolic Acidosis: Highest Anion Gap = 19 mmol/L in last 2 days, will monitor and treat as appropriate  # Hypoalbuminemia: Lowest albumin = 3.2 g/dL at 7/15/2025  6:08 AM, will monitor as appropriate    # Coagulation Defect: INR = 1.36 (Ref range: 0.85 - 1.15) and/or PTT = N/A, will monitor for bleeding                # Severe Malnutrition: based on nutrition assessment and treatment provided per dietitian's recommendations., PRESENT ON ADMISSION          Social Drivers of Health    Depression: At risk (6/10/2025)    PHQ-2     PHQ-2 Score: 3   Tobacco Use: Medium Risk (7/8/2025)    Received from [facility]    Patient History     Smoking Tobacco Use: Former     Smokeless Tobacco Use: Never          Disposition Plan     Medically Ready for Discharge: Anticipated in 2-4 Days             Sha Linder MD  Hospitalist  Service, GOLD TEAM 22  [facility]  Securely message with Site Tour (more info)  Text page via [facility] Paging/Directory   See signed in provider for up to date coverage information  ______________________________________________________________________    Interval History   As above. Naeo.     Physical Exam   Vital Signs: Temp: 98.7  F (37.1  C) Temp src: Oral BP: 96/65 Pulse: 112   Resp: 18 SpO2: 95 % O2 Device: None (Room air)    Weight: 111 lbs 1.79 oz      Gen: cachectic, chronically ill-appearing. Pale. No acute distress, alert. NGT in place.   CV: sinus tach 120, no murmurs  Pulm: no increased WOB  Abd: soft, nl BS  Extremities: no edema      Medical Decision Making             Data     I have personally reviewed the following data over the past 24 hrs:    N/A  \   N/A   / N/A     129 (L) 92 (L) 3.0 (L) /  115 (H)   3.2 (L) 23 0.41 (L) \       Imaging results reviewed over the past 24 hrs:   Recent Results (from the past 24 hours)   XR Abdomen Port 1 View    Narrative    Exam: XR ABDOMEN PORT 1 VIEW, 7/15/2025 2:07 PM    Indication: TECH: Please locate the END of the tube to verify gastric  feeding tube placement    Comparison: X-ray 7/14/2025.    Findings:   Frontal radiograph of the abdomen. Feeding tube tip projects below the  diaphragm, follows the greater curvature of the stomach terminating in  the distal stomach/near the antrum, not postpyloric. Gas-filled colon,  no abnormally distended loops of small or large bowel. No portal  venous gas, no pneumatosis. Minimal stool burden. Prominent  costochondral changes of the ribs.         Impression    Impression:   1. Feeding tube tip is at the gastric antrum, not postpyloric.  2. Nonobstructive bowel gas pattern.     I have personally reviewed the examination and initial interpretation  and I agree with the findings.    AMIE DRAPER MD         SYSTEM ID:  [id number]   CT Chest/Abdomen/Pelvis w Contrast    Narrative     EXAMINATION: CT CHEST/ABDOMEN/PELVIS W CONTRAST, 7/15/2025 10:00 PM    INDICATION: Assess cancer progression, early satiety, vomiting    COMPARISON STUDY: 6/20/2025    TECHNIQUE: CT scan of the abdomen and pelvis was performed on  multidetector CT scanner using volumetric acquisition technique and  images were reconstructed in multiple planes with variable thickness  and reviewed on dedicated workstations.     CONTRAST: 53mL Isovue 370 injected IV without oral contrast    CT scan radiation dose is optimized to minimum requisite dose using  automated dose modulation techniques.    FINDINGS:  Right chest wall Port-A-Cath with tip at the cavoatrial junction.    Lungs: No significant change in multiple pulmonary masses, for example  left lower lobe measuring 4.3 x 5.4 cm, previously 4.5 x 5.2 cm..  No  focal consolidation. No pneumothorax. No pleural effusion.    Mediastinum:  Heart size is within normal limits. Ascending thoracic  aorta is within normal limits. Normal caliber main pulmonary artery.  Mild coronary artery calcifications. No significant pericardial  effusion. Calcified mediastinal lymph nodes. Enlarged hilar lymph  nodes are similar to prior.    Liver: Significant increase in liver metastatic lesions example series  2 image 98 measuring 2.2 x 2.1 cm, previously 0.6 x 1.0 cm. Additional  lesion of the caudate lobe measuring 2.3 x 2.3 cm, previously 0.9 x  0.7 cm. The largest liver lesion measures 9.6 x 8.8 cm, previously 7.4  x 7.0 cm. No intrahepatic biliary ductal dilation.    Biliary System: Cholelithiasis with pericholecystic fluid. No  extrahepatic biliary ductal dilation.    Pancreas: No mass or pancreatic ductal dilation.    Adrenal glands: Overall stable right adrenal metastases together  measuring 2.4 x 2.6 cm. No left mass or nodules    Spleen: Normal.    Kidneys: No suspicious mass, obstructing calculus or hydronephrosis.    Gastrointestinal tract: Enteric tube tip is within  the  pylorus/proximal duodenum. Normal appendix. Normal caliber small  bowel.  Stable left rectal wall thickening with similar extension into  the mesorectal fascia.    Mesentery/peritoneum/retroperitoneum: No mass. No free fluid or air.   Small fat-containing umbilical hernia.    Lymph nodes: Continued increased size of retroperitoneal lymph nodes  for example series 2 image 157 measuring 9 mm on the short axis,  previously 4 mm.     Vasculature: Patent major abdominal vasculature.    Pelvis: Urinary bladder is normal. Uterus and adnexa within normal  limits.    Osseous structures: No aggressive or acute osseous lesion.      Soft tissues: Within normal limits.      Impression    IMPRESSION:   1. Interval increase of disease burden within the liver as well as the  periportal and retroperitoneal lymph nodes.  2. Stable size of right adrenal gland metastases and lung metastases.  3. Redemonstration of mural wall thickening of the left rectum with  similar extension into the mesorectal fascia.    I have personally reviewed the examination and initial interpretation  and I agree with the findings.    VINAY BASURTO DO         SYSTEM ID:  [id number]

## 2025-07-16 NOTE — PROGRESS NOTES
CLINICAL NUTRITION SERVICES - BRIEF NOTE     RECOMMENDATIONS FOR MDs/PROVIDERS TO ORDER:  Recommend continuing to monitor BMP, Phos, Mg q 12 hr (as currently ordered by provider) until TF @ goal of 45 ml/hr and then switch to q 24 hr through weekend.    Registered Dietitian Interventions:  - Continue TF advance as ordered.  - Decreased FWF to 30 ml q 4 hr as pt was able to drink fluids yesterday.  - Recommend continuing to monitor BMP, Phos, Mg q 12 hr (as currently ordered by provider) until TF @ goal of 45 ml/hr and then switch to q 24 hr through weekend.  - Provider ordered scheduled miralax.  Contacted RN to give w/ 4 oz fluid d/t low sodium.   - Will check w/ PharmD about adjusting any larger pills to liquid via tube if feasible to limit triggers for gag reflex.    Future/Additional Recommendations:  Monitor labs for stable sodium and refeeding risk, stooling, weight trends, oral intake and enteral support tolerance      INFORMATION OBTAINED  Assessed patient in room.    CURRENT NUTRITION ORDERS  Diet: High Kcal/High Protein  Nutrition Support: Jevity 1.5 currently @ 25 ml/hr since early AM and advancing by 10 ml q 12 hr (pending electrolytes at or above refeeding parameters) to goal of 45 ml/hr. At goal will provide 1620 kcals, 68 g PRO, 820 ml free H20, 232 g CHO, and 22 g fiber daily.   FWF: 60 ml q 4 hr w/ 75 ml q 4 hr in comment and not seen--pump programmed for 60 ml.     CURRENT INTAKE/TOLERANCE  Pt did drink about 14 oz of water yesterday.  She notes no nausea currently w/ TF via NG at currently rate.  She notes nausea can be triggered more by swallowing large pills, odors from foods (even peanut butter).  She is okay to sipping on beverages just as needed until Na improves.  She was going to try ordering some dry cereal to nibble on today.        NEW FINDINGS  GI symptoms: Reviewed  No nausea. No BM since 7/11.    Nutrition-relevant labs: Reviewed   Latest Reference Range & Units 07/15/25 06:08  "07/15/25 20:21 07/16/25 05:22   Sodium 135 - 145 mmol/L 132 (L) 130 (L) 129 (L)   Potassium 3.4 - 5.3 mmol/L 3.6 3.4 3.2 (L)   Chloride 98 - 107 mmol/L 94 (L) 92 (L) 92 (L)   Carbon Dioxide (CO2) 22 - 29 mmol/L 19 (L) 22 23   Urea Nitrogen 6.0 - 20.0 mg/dL 3.4 (L) 2.3 (L) 3.0 (L)   Creatinine 0.51 - 0.95 mg/dL 0.39 (L) 0.35 (L) 0.41 (L)   Anion Gap 7 - 15 mmol/L 19 (H) 16 (H) 14   Magnesium 1.7 - 2.3 mg/dL 1.9 1.8 1.8   Phosphorus 2.5 - 4.5 mg/dL 2.8 2.6 2.5   (L): Data is abnormally low  (H): Data is abnormally high    Nutrition-relevant medications: Reviewed  Standard replacement protocols for Mg, Phos and K+ ordered.     Weight:   07/16/25 0700 50.4 kg (111 lb 1.8 oz)   Wt method not noted. If bed scale likely had HOB up w/ TF running.       MONITORING/EVALUATION  Progress toward goals will be monitored and evaluated per policy.    Daksha Severino RD, LD   6 & 8 Med/Surg RD  Mon-Fri Vocera: \"6 Med Surg Clinical Dietitian\" or \"8 Med Surg Clinical Dietitian\"  Weekend RD Vocera (Global): \"Weekend Holiday Clinical Dietitian\"        "

## 2025-07-16 NOTE — PHARMACY-CONSULT NOTE
"Pharmacy consulted by dietician for: \"She is okay w/ taking small pills orally but larger ones make trigger gag reflex and vomiting.Could you review Rx including prns that she's taken those for and adjust large ones to liquid if available? Did have emesis w/ zofran so liquid version likely preferred regardless of size. \"    In reviewing patient's current medications all seem to be a smaller tablet, capsule, or liquid. Patient did received some potassium chloride tablets for potassium replacement today which are larger and could have triggered the gagging.     Recommendations:  Use potassium packets or solution for future replacement doses  Will switch pantoprazole tablet back to omeprazole capsule that patient takes at home  Will give oral liquid option for ondansetron if patient prefers to try that over ODT tablet.    Savita Moncada, PharmD, BCPS      Current Facility-Administered Medications   Medication Dose Route Frequency Provider Last Rate Last Admin    benzonatate (TESSALON) capsule 100 mg  100 mg Oral TID PRN Rosette Mckeon PA-C        calcium carbonate (TUMS) chewable tablet 1,000 mg  1,000 mg Oral or Feeding Tube 4x Daily PRN Sha Linder MD        dextrose 10% infusion   Intravenous Continuous PRN Sha Linder MD        heparin lock flush 10 unit/mL injection 5-10 mL  5-10 mL Intracatheter Q1H PRN Lonnie Dubose MD   5 mL at 07/16/25 0517    heparin lock flush 10 unit/mL injection 5-10 mL  5-10 mL Intracatheter Q24H Lonnie Dubose MD   5 mL at 07/15/25 2020    heparin lock flush 100 unit/mL injection 5-10 mL  5-10 mL Intracatheter Q28 Days Lonnie Dubose MD        HYDROmorphone (DILAUDID) tablet 2 mg  2 mg Oral or Feeding Tube Q4H PRN Sha Linder MD   2 mg at 07/16/25 0802    Or    HYDROmorphone (DILAUDID) tablet 4 mg  4 mg Oral or Feeding Tube Q4H PRN Sha Linder MD        lidocaine (LMX4) cream   Topical Q1H PRN Rosette Mckeon PA-C        lidocaine 1 % 0.1-1 mL  0.1-1 mL " Other Q1H PRN Rosette Mckeon PA-C        LORazepam (ATIVAN) tablet 0.5 mg  0.5 mg Oral or Feeding Tube Q6H PRN Sha Linder MD        multivitamins w/minerals liquid 15 mL  15 mL Oral or Feeding Tube Daily Sha Linder MD   15 mL at 07/16/25 0759    naloxone (NARCAN) injection 0.2 mg  0.2 mg Intravenous Q2 Min PRN Clovis Monge MD        Or    naloxone (NARCAN) injection 0.4 mg  0.4 mg Intravenous Q2 Min PRN Clovis Monge MD        Or    naloxone (NARCAN) injection 0.2 mg  0.2 mg Intramuscular Q2 Min PRN Clovis Monge MD        Or    naloxone (NARCAN) injection 0.4 mg  0.4 mg Intramuscular Q2 Min PRN Clovis Monge MD        OLANZapine (zyPREXA) tablet 5 mg  5 mg Oral or Feeding Tube At Bedtime Sha Linder MD   5 mg at 07/15/25 2129    [START ON 7/17/2025] omeprazole (PriLOSEC) CR capsule 20 mg  20 mg Oral QAM AC Sha Linder MD        ondansetron (ZOFRAN ODT) ODT tab 8 mg  8 mg Oral Q8H PRN Sha Linder MD   8 mg at 07/15/25 1300    polyethylene glycol (MIRALAX) Packet 17 g  17 g Oral or Feeding Tube Daily PRN Sha Linder MD   17 g at 07/16/25 1052    prochlorperazine (COMPAZINE) tablet 10 mg  10 mg Oral or Feeding Tube Q6H PRN Sha Linder MD        senna-docusate (SENOKOT-S/PERICOLACE) 8.6-50 MG per tablet 1 tablet  1 tablet Oral or Feeding Tube BID PRN Sha Linder MD        Or    senna-docusate (SENOKOT-S/PERICOLACE) 8.6-50 MG per tablet 2 tablet  2 tablet Oral or Feeding Tube BID PRN Sha Linder MD        sodium chloride (PF) 0.9% PF flush 10-20 mL  10-20 mL Intracatheter q1 min prn Lonnie Dubose MD   10 mL at 07/15/25 2240    sodium chloride (PF) 0.9% PF flush 10-20 mL  10-20 mL Intracatheter Q1H PRN Lonnie Dubose MD   20 mL at 07/16/25 0517    sodium chloride (PF) 0.9% PF flush 10-20 mL  10-20 mL Intracatheter Q28 Days Lonnie Dubose MD        sodium chloride (PF) 0.9% PF flush 3 mL  3 mL Intracatheter Q8H MARQUEZ Mckeon  Rosette MORENO PA-C   3 mL at 07/15/25 1430    thiamine (B-1) tablet 100 mg  100 mg Oral or Feeding Tube Daily Sha Linder MD   100 mg at 07/16/25 2007

## 2025-07-16 NOTE — PROGRESS NOTES
Solid Tumor Oncology Consult Service  Progress Note   Date of Service: 07/16/2025  Patient: Roula Darling  MRN: [id number]  Admission Date: 7/14/2025  Hospital Day # 2  Cancer Diagnosis: Rectal Cancer   Primary Outpatient Oncologist: Kiki Hernandez   Current Treatment Plan: Research trial [id number]        Summary & Recommendations:   - Continue to monitor refeeding labs  - Patient will need a stable nutrition plan prior to discharge  - We will clarify her eligibility for other treatment trials, and discuss with patient in the morning.    - Pt would benefit from palliative care consult for ongoing goals of care discussions.    Assessment & Plan:   Roula Darling is a 50 year old female with past medical history of stage IV metastatic rectal carcinoma who was admitted for malnutrition, ketosis, and failure to thrive in the setting of metastatic cancer. Interval increase in Ca19-9 and increased tumor burden on CT scan suggest progression.     # Stage IV Metastatic Rectal Adenocarcinoma   Follows with Dr. Hernandez at [facility], follows with Dr. Deleon with [facility] for TOR clinical trial. CT-CAP suggestive of progression, however research read of the scan is pending at this time.   -Oncology will follow up research read of the scan and discuss next steps with the patient  - We had a discussion with she and her  today that additional therapy would rely on whether there are available clinical trials, as well as her overall nutritional and performance status.        AGMA 2/2 starvation ketosis  Failure to thrive   Suspected 2/2 caloric demand of metastatic cancer in combination with poor PO intake and nausea/vomiting. At thsi time, she is receiving nutritional support via feeding tube. Potassium has declined overnight, suggesting high risk of refeeding syndrome.   - Continue nutritional support per dietician  - Monitor for refeeding syndrome, likely ~48 hours. Patient will need a stable  nutrition plan prior to discharge.     --- Please see primary team note for comprehensive A&P ---    Oncologic History:    She was diagnosed with stage IV rectal carcinoma he is in early 2023, and received palliative radiation at [address] to the pelvis.  From February 22 to May 1 she received FOLFOX chemotherapy every 2 weeks, and then from May 1 she received only 5 fluorouracil due to a reaction to oxaliplatin that necessitated the use of an EpiPen.  She failed oxaliplatin desensitization on 5/8/2023.  From 5/22/2023 to 1/16/2024, she received FOLFIRI with G-CSF support every 2 weeks however on this regimen she had progressive disease in the lungs.  From February 6 to April 2, 2024 she received Lonsurf with bevacizumab.  From April 20 to May 20, she received regorafinib.  in June 2024, she began the clinical trial of XTX-301.  In August 2024 she was desensitized for oxaliplatin, and then received FOLFOX until February 2025, after which she received 5-fluorouracil alone until March.  From April through June she received Fruqintinib, and then on 6/30/2025, she began a clinical trial of T ORL-1-23 at [facility]. She received 1 dose at 2.4 mg/kg for a total of 127 mg on 6/30/25.         Patient was seen and plan of care was discussed with attending physician Dr. Holly.    Thank you for the opportunity to partake in this patient's plan of care. Please do not hesitate to page with questions. We will continue to follow .     Miller Stanford MD-PhD  PSTP- PGY - 2    Addendum:  Pt was seen and evaluated by me with Dr Stanford.  We discussed pt's scans demonstrate disease progression.  It is not clear to me whether or not she has other clinical trial options or not.  We will explore this and discuss further with patient in the morning.      We discussed optimizing nutrition is important if she is considering additional therapy.      Continue to address goals of care.    Carole Holly MD  "      ___________________________________________________________________    Subjective & Interval History:    No acute events noted overnight. Patient seen by [employer] call, reports getting used to the feeding tube, no pain, no vomiting. She has had a mild cough since the tube was placed.     Physical Exam:    Blood pressure 96/65, pulse 112, temperature 98.7  F (37.1  C), temperature source Oral, resp. rate 18, height 1.645 m (5' 4.76\"), weight 50.4 kg (111 lb 1.8 oz), SpO2 95%.        Labs & Studies: I personally reviewed the following studies:  ROUTINE LABS (Last four results):  CMP  Recent Labs   Lab 07/16/25  0522 07/15/25  2021 07/15/25  1705 07/15/25  1222 07/15/25  0608 07/14/25  1356 07/14/25  1058   * 130*  --   --  132* 131* 129*   POTASSIUM 3.2* 3.4  --   --  3.6 3.7 3.8   CHLORIDE 92* 92*  --   --  94* 95* 89*   CO2 23 22  --   --  19* 19* 19*   ANIONGAP 14 16*  --   --  19* 17* 21*   * 100* 89 81 87 80 84   BUN 3.0* 2.3*  --   --  3.4* 5.2* 6.4   CR 0.41* 0.35*  --   --  0.39* 0.41* 0.45*   GFRESTIMATED >90 >90  --   --  >90 >90 >90   EBONIE 8.4* 8.2*  --   --  8.3* 8.3* 9.3   MAG 1.8 1.8  --   --  1.9  --  1.9   PHOS 2.5 2.6  --   --  2.8  --  3.2   PROTTOTAL  --   --   --   --  6.7  --  7.4   ALBUMIN  --   --   --   --  3.2*  --  3.7   BILITOTAL  --   --   --   --  0.4  --  0.5   ALKPHOS  --   --   --   --  163*  --  174*   AST  --   --   --   --  67*  --  57*   ALT  --   --   --   --  13  --  12     CBC  Recent Labs   Lab 07/15/25  0608 07/14/25  1058   WBC 7.6 8.0   RBC 3.53* 4.01   HGB 8.7* 9.7*   HCT 27.4* 30.5*   MCV 78 76*   MCH 24.6* 24.2*   MCHC 31.8 31.8   RDW 18.5* 18.6*    506*     INR  Recent Labs   Lab 07/14/25  1058   INR 1.36*     Medications list for reference:  Current Facility-Administered Medications   Medication Dose Route Frequency Provider Last Rate Last Admin    benzonatate (TESSALON) capsule 100 mg  100 mg Oral TID PRN Rosette Mckeon PA-C        calcium " carbonate (TUMS) chewable tablet 1,000 mg  1,000 mg Oral or Feeding Tube 4x Daily PRN Sha Linder MD        dextrose 10% infusion   Intravenous Continuous PRN Sha Linder MD        heparin lock flush 10 unit/mL injection 5-10 mL  5-10 mL Intracatheter Q1H PRN Lonnie Dubose MD   5 mL at 07/16/25 0517    heparin lock flush 10 unit/mL injection 5-10 mL  5-10 mL Intracatheter Q24H Lonnie Dubose MD   5 mL at 07/15/25 2020    heparin lock flush 100 unit/mL injection 5-10 mL  5-10 mL Intracatheter Q28 Days Lonnie Dubose MD        HYDROmorphone (DILAUDID) tablet 2 mg  2 mg Oral or Feeding Tube Q4H PRN Sha Linder MD   2 mg at 07/16/25 0802    Or    HYDROmorphone (DILAUDID) tablet 4 mg  4 mg Oral or Feeding Tube Q4H PRN Sha Linder MD        lidocaine (LMX4) cream   Topical Q1H PRN Rosette Mckeon PA-C        lidocaine 1 % 0.1-1 mL  0.1-1 mL Other Q1H PRN Rosette Mckeon PA-C        LORazepam (ATIVAN) tablet 0.5 mg  0.5 mg Oral or Feeding Tube Q6H PRN Sha Linder MD        multivitamins w/minerals liquid 15 mL  15 mL Oral or Feeding Tube Daily Sha Linder MD   15 mL at 07/16/25 0759    naloxone (NARCAN) injection 0.2 mg  0.2 mg Intravenous Q2 Min PRN Clovis Monge MD        Or    naloxone (NARCAN) injection 0.4 mg  0.4 mg Intravenous Q2 Min PRN Clovis Monge MD        Or    naloxone (NARCAN) injection 0.2 mg  0.2 mg Intramuscular Q2 Min PRN Clovis Monge MD        Or    naloxone (NARCAN) injection 0.4 mg  0.4 mg Intramuscular Q2 Min PRN Clovis Monge MD        OLANZapine (zyPREXA) tablet 5 mg  5 mg Oral or Feeding Tube At Bedtime Sha Linder MD   5 mg at 07/15/25 2129    ondansetron (ZOFRAN ODT) ODT tab 8 mg  8 mg Oral Q8H PRN Sha Linder MD   8 mg at 07/15/25 1300    pantoprazole (PROTONIX) EC tablet 40 mg  40 mg Oral Daily Sha Linder MD   40 mg at 07/16/25 3929    polyethylene glycol (MIRALAX) Packet 17 g  17 g Oral or Feeding  Tube Daily PRN Sha Linder MD   17 g at 07/16/25 1052    prochlorperazine (COMPAZINE) tablet 10 mg  10 mg Oral or Feeding Tube Q6H PRN Sha Linder MD        senna-docusate (SENOKOT-S/PERICOLACE) 8.6-50 MG per tablet 1 tablet  1 tablet Oral or Feeding Tube BID PRN Sha Linder MD        Or    senna-docusate (SENOKOT-S/PERICOLACE) 8.6-50 MG per tablet 2 tablet  2 tablet Oral or Feeding Tube BID PRN Sha Linder MD        sodium chloride (PF) 0.9% PF flush 10-20 mL  10-20 mL Intracatheter q1 min prn Lonnie Dubose MD   10 mL at 07/15/25 2240    sodium chloride (PF) 0.9% PF flush 10-20 mL  10-20 mL Intracatheter Q1H PRN Lonnie Dubose MD   20 mL at 07/16/25 0517    sodium chloride (PF) 0.9% PF flush 10-20 mL  10-20 mL Intracatheter Q28 Days Lonnie Dubose MD        sodium chloride (PF) 0.9% PF flush 3 mL  3 mL Intracatheter Q8H Rosette Francis PA-C   3 mL at 07/15/25 1430    sodium chloride 0.9% BOLUS 500 mL  500 mL Intravenous Once Sha Linder  mL/hr at 07/16/25 0926 500 mL at 07/16/25 0926    thiamine (B-1) tablet 100 mg  100 mg Oral or Feeding Tube Daily Sha Linder MD   100 mg at 07/16/25 075

## 2025-07-16 NOTE — PLAN OF CARE
"Goal Outcome Evaluation:      Plan of Care Reviewed With: patient    Overall Patient Progress: improvingOverall Patient Progress: improving         VS: /75   Pulse 112   Temp 98.7  F (37.1  C) (Oral)   Resp 18   Ht 1.645 m (5' 4.76\")   Wt 50.4 kg (111 lb 1.8 oz)   SpO2 95%   BMI 18.62 kg/m       O2: Stable @ RA,Denies SOB ,Chest pain   Output: Continent of b/b   Last BM: 7/11/25   Activity: Independent in room   Skin: R.Central [place] Access   Pain: Pain managed with oral Dilaudid   CMS: AXOX4   Dressing: C/D/I Central line dressing   Diet: Continuous  feeding changed @ 3pm to 35ml/hr   LDA: R.[place] Heparin locked   Equipment: Personal belongings   Plan: Continue with POC   Additional Info: RN Managed K,Mag,Ph.  Potasium & Phosphorus replaced            "

## 2025-07-17 ENCOUNTER — ENROLLMENT (OUTPATIENT)
Dept: HOME HEALTH SERVICES | Facility: HOME HEALTH | Age: 50
End: 2025-07-17
Payer: COMMERCIAL

## 2025-07-17 ENCOUNTER — APPOINTMENT (OUTPATIENT)
Dept: CARDIOLOGY | Facility: CLINIC | Age: 50
End: 2025-07-17
Attending: STUDENT IN AN ORGANIZED HEALTH CARE EDUCATION/TRAINING PROGRAM
Payer: COMMERCIAL

## 2025-07-17 VITALS
SYSTOLIC BLOOD PRESSURE: 103 MMHG | RESPIRATION RATE: 18 BRPM | TEMPERATURE: 98.1 F | DIASTOLIC BLOOD PRESSURE: 73 MMHG | BODY MASS INDEX: 18.51 KG/M2 | HEART RATE: 107 BPM | OXYGEN SATURATION: 95 % | HEIGHT: 65 IN | WEIGHT: 111.11 LBS

## 2025-07-17 LAB
ALBUMIN UR-MCNC: NEGATIVE MG/DL
ANION GAP SERPL CALCULATED.3IONS-SCNC: 12 MMOL/L (ref 7–15)
ANION GAP SERPL CALCULATED.3IONS-SCNC: 14 MMOL/L (ref 7–15)
APPEARANCE UR: CLEAR
BACTERIA #/AREA URNS HPF: ABNORMAL /HPF
BILIRUB UR QL STRIP: NEGATIVE
BUN SERPL-MCNC: 4.1 MG/DL (ref 6–20)
BUN SERPL-MCNC: 5.6 MG/DL (ref 6–20)
CALCIUM SERPL-MCNC: 7.7 MG/DL (ref 8.8–10.4)
CALCIUM SERPL-MCNC: 8.1 MG/DL (ref 8.8–10.4)
CHLORIDE SERPL-SCNC: 96 MMOL/L (ref 98–107)
CHLORIDE SERPL-SCNC: 98 MMOL/L (ref 98–107)
COLOR UR AUTO: YELLOW
CREAT SERPL-MCNC: 0.34 MG/DL (ref 0.51–0.95)
CREAT SERPL-MCNC: 0.37 MG/DL (ref 0.51–0.95)
EGFRCR SERPLBLD CKD-EPI 2021: >90 ML/MIN/1.73M2
EGFRCR SERPLBLD CKD-EPI 2021: >90 ML/MIN/1.73M2
GLUCOSE SERPL-MCNC: 125 MG/DL (ref 70–99)
GLUCOSE SERPL-MCNC: 135 MG/DL (ref 70–99)
GLUCOSE UR STRIP-MCNC: NEGATIVE MG/DL
HCO3 SERPL-SCNC: 24 MMOL/L (ref 22–29)
HCO3 SERPL-SCNC: 25 MMOL/L (ref 22–29)
HGB UR QL STRIP: NEGATIVE
HYALINE CASTS: 1 /LPF
KETONES UR STRIP-MCNC: ABNORMAL MG/DL
LEUKOCYTE ESTERASE UR QL STRIP: NEGATIVE
LVEF ECHO: NORMAL
MAGNESIUM SERPL-MCNC: 1.8 MG/DL (ref 1.7–2.3)
MAGNESIUM SERPL-MCNC: 1.8 MG/DL (ref 1.7–2.3)
MUCOUS THREADS #/AREA URNS LPF: PRESENT /LPF
NITRATE UR QL: NEGATIVE
PH UR STRIP: 7.5 [PH] (ref 5–7)
PHOSPHATE SERPL-MCNC: 2 MG/DL (ref 2.5–4.5)
PHOSPHATE SERPL-MCNC: 2.8 MG/DL (ref 2.5–4.5)
POTASSIUM SERPL-SCNC: 3.6 MMOL/L (ref 3.4–5.3)
POTASSIUM SERPL-SCNC: 3.9 MMOL/L (ref 3.4–5.3)
RBC URINE: <1 /HPF
SODIUM SERPL-SCNC: 133 MMOL/L (ref 135–145)
SODIUM SERPL-SCNC: 136 MMOL/L (ref 135–145)
SP GR UR STRIP: 1.01 (ref 1–1.03)
UROBILINOGEN UR STRIP-MCNC: 3 MG/DL
WBC URINE: 2 /HPF

## 2025-07-17 PROCEDURE — 99232 SBSQ HOSP IP/OBS MODERATE 35: CPT | Performed by: STUDENT IN AN ORGANIZED HEALTH CARE EDUCATION/TRAINING PROGRAM

## 2025-07-17 PROCEDURE — 81001 URINALYSIS AUTO W/SCOPE: CPT | Performed by: STUDENT IN AN ORGANIZED HEALTH CARE EDUCATION/TRAINING PROGRAM

## 2025-07-17 PROCEDURE — 258N000003 HC RX IP 258 OP 636: Performed by: STUDENT IN AN ORGANIZED HEALTH CARE EDUCATION/TRAINING PROGRAM

## 2025-07-17 PROCEDURE — 250N000013 HC RX MED GY IP 250 OP 250 PS 637: Performed by: STUDENT IN AN ORGANIZED HEALTH CARE EDUCATION/TRAINING PROGRAM

## 2025-07-17 PROCEDURE — 80048 BASIC METABOLIC PNL TOTAL CA: CPT | Performed by: STUDENT IN AN ORGANIZED HEALTH CARE EDUCATION/TRAINING PROGRAM

## 2025-07-17 PROCEDURE — 120N000002 HC R&B MED SURG/OB UMMC

## 2025-07-17 PROCEDURE — 81003 URINALYSIS AUTO W/O SCOPE: CPT | Performed by: STUDENT IN AN ORGANIZED HEALTH CARE EDUCATION/TRAINING PROGRAM

## 2025-07-17 PROCEDURE — 93306 TTE W/DOPPLER COMPLETE: CPT

## 2025-07-17 PROCEDURE — 99233 SBSQ HOSP IP/OBS HIGH 50: CPT | Performed by: INTERNAL MEDICINE

## 2025-07-17 PROCEDURE — 99255 IP/OBS CONSLTJ NEW/EST HI 80: CPT | Performed by: CLINICAL NURSE SPECIALIST

## 2025-07-17 PROCEDURE — 250N000009 HC RX 250: Performed by: STUDENT IN AN ORGANIZED HEALTH CARE EDUCATION/TRAINING PROGRAM

## 2025-07-17 PROCEDURE — 84100 ASSAY OF PHOSPHORUS: CPT | Performed by: PHYSICIAN ASSISTANT

## 2025-07-17 PROCEDURE — 93306 TTE W/DOPPLER COMPLETE: CPT | Mod: 26 | Performed by: INTERNAL MEDICINE

## 2025-07-17 PROCEDURE — 250N000011 HC RX IP 250 OP 636: Performed by: STUDENT IN AN ORGANIZED HEALTH CARE EDUCATION/TRAINING PROGRAM

## 2025-07-17 PROCEDURE — 99418 PROLNG IP/OBS E/M EA 15 MIN: CPT | Performed by: CLINICAL NURSE SPECIALIST

## 2025-07-17 PROCEDURE — 83735 ASSAY OF MAGNESIUM: CPT | Performed by: PHYSICIAN ASSISTANT

## 2025-07-17 PROCEDURE — 250N000013 HC RX MED GY IP 250 OP 250 PS 637: Performed by: INTERNAL MEDICINE

## 2025-07-17 PROCEDURE — 82310 ASSAY OF CALCIUM: CPT | Performed by: STUDENT IN AN ORGANIZED HEALTH CARE EDUCATION/TRAINING PROGRAM

## 2025-07-17 RX ORDER — IBUPROFEN 600 MG/1
600 TABLET, FILM COATED ORAL EVERY 8 HOURS PRN
Status: DISCONTINUED | OUTPATIENT
Start: 2025-07-17 | End: 2025-07-18 | Stop reason: HOSPADM

## 2025-07-17 RX ORDER — BENZONATATE 100 MG/1
100 CAPSULE ORAL 3 TIMES DAILY PRN
COMMUNITY
Start: 2025-07-17 | End: 2025-07-18

## 2025-07-17 RX ORDER — ACETAMINOPHEN 325 MG/1
975 TABLET ORAL EVERY 8 HOURS PRN
Status: DISCONTINUED | OUTPATIENT
Start: 2025-07-17 | End: 2025-07-18 | Stop reason: HOSPADM

## 2025-07-17 RX ORDER — LORAZEPAM 0.5 MG/1
1 TABLET ORAL EVERY 6 HOURS PRN
COMMUNITY
Start: 2025-07-17

## 2025-07-17 RX ORDER — LANOLIN ALCOHOL/MO/W.PET/CERES
100 CREAM (GRAM) TOPICAL DAILY
Qty: 60 TABLET | Refills: 0 | Status: SHIPPED | OUTPATIENT
Start: 2025-07-18 | End: 2025-07-18

## 2025-07-17 RX ADMIN — IBUPROFEN 600 MG: 600 TABLET ORAL at 16:44

## 2025-07-17 RX ADMIN — IBUPROFEN 600 MG: 600 TABLET ORAL at 05:16

## 2025-07-17 RX ADMIN — SENNOSIDES AND DOCUSATE SODIUM 1 TABLET: 50; 8.6 TABLET ORAL at 07:58

## 2025-07-17 RX ADMIN — POLYETHYLENE GLYCOL 3350 17 G: 17 POWDER, FOR SOLUTION ORAL at 07:58

## 2025-07-17 RX ADMIN — SODIUM CHLORIDE, SODIUM LACTATE, POTASSIUM CHLORIDE, AND CALCIUM CHLORIDE 500 ML: .6; .31; .03; .02 INJECTION, SOLUTION INTRAVENOUS at 04:02

## 2025-07-17 RX ADMIN — THIAMINE HCL TAB 100 MG 100 MG: 100 TAB at 07:56

## 2025-07-17 RX ADMIN — OLANZAPINE 5 MG: 5 TABLET, FILM COATED ORAL at 22:12

## 2025-07-17 RX ADMIN — HYDROMORPHONE HYDROCHLORIDE 2 MG: 2 TABLET ORAL at 22:12

## 2025-07-17 RX ADMIN — POTASSIUM & SODIUM PHOSPHATES POWDER PACK 280-160-250 MG 1 PACKET: 280-160-250 PACK at 03:40

## 2025-07-17 RX ADMIN — SODIUM CHLORIDE 1000 ML: 0.9 INJECTION, SOLUTION INTRAVENOUS at 11:19

## 2025-07-17 RX ADMIN — HYDROMORPHONE HYDROCHLORIDE 2 MG: 2 TABLET ORAL at 12:21

## 2025-07-17 RX ADMIN — HEPARIN, PORCINE (PF) 10 UNIT/ML INTRAVENOUS SYRINGE 5 ML: at 22:13

## 2025-07-17 RX ADMIN — Medication 15 ML: at 07:56

## 2025-07-17 RX ADMIN — SODIUM PHOSPHATE, MONOBASIC, MONOHYDRATE AND SODIUM PHOSPHATE, DIBASIC, ANHYDROUS 9 MMOL: 276; 142 INJECTION, SOLUTION INTRAVENOUS at 12:24

## 2025-07-17 RX ADMIN — OMEPRAZOLE 20 MG: 20 CAPSULE, DELAYED RELEASE ORAL at 07:56

## 2025-07-17 ASSESSMENT — ACTIVITIES OF DAILY LIVING (ADL)
ADLS_ACUITY_SCORE: 32
DEPENDENT_IADLS:: INDEPENDENT
ADLS_ACUITY_SCORE: 32

## 2025-07-17 NOTE — CONSULTS
"  Palliative Care Consultation Note  [facility]      Patient: Roula Darling  Date of Admission:  7/14/2025    Requesting Clinician / Team: Sha Linder MD   Reason for consult: Symptom management  Goals of care  Patient and family support    \"    metastatic cancer on clinical trial, treatment limited by malnutrition; admitted for NGT feed initiation now considering surgical GJT placement.   \"     Recommendations & Counseling     GOALS OF CARE:   Currently DNR/DNI  Agreeable for trial period of TF for 1-2 weeks to give her time to talk with her OP Palliative Care provider and her Oncology doctors  She continues to contemplate goals of care moving forward and benefited from information about hospice and end of life goals of care    ADVANCE CARE PLANNING:  No health care directive on file. Per system policy, Surrogate Decision-makers for Patients With Diminished Decision-making Capacity offers guidance on possible decision-makers. Spouse has been identified as a surrogate decision maker.   There is a POLST form on file, but will need to be updated prior to discharge.  Code status: No CPR- Do NOT Intubate    MEDICAL MANAGEMENT:   #Pain,acute related to NG tube: finding the tube painful, difficult to breath through her nose and that is causing her to have an increased HR, also some pain from the tube.  Added hurricane spray to attempt to help with throat pain       PSYCHOSOCIAL/SPIRITUAL SUPPORT:  Family Spouse    Palliative Care will continue to follow. Thank you for the consult and allowing us to aid in the care of Roula Darling.    These recommendations have been discussed with primary team.    SOILA Torres CNS  [facility], Palliative Care  Securely message with the Vocera Web Console (learn more here) or  Text page via [facility] Paging/Directory         Assessment      Roula Darling is a 50 year old female with a past medical history of stage IV " metastatic rectal cancer who presented on 7/14 with malnutrition, ketosis, FTT.      Today, the patient was seen for:  Metastatic rectal cancer  Severe malnutrition  Ketosis  Nausea  Vomiting      History of Present Illness   Met with patient and spouse today.   Introduced the role of palliative care as an interdisciplinary team that cares for patients with serious illness to help support symptom management, communication, coping for patients and their families as well as support with medical decision making.    Prognosis, Goals, & Planning:   Functional Status just prior to this current hospitalization:  ECOG1 (Restricted in physically strenuous activity but ambulatory and able to carry out work of a light or sedentary nature)  2 (Ambulatory and capable of all selfcare but unable to carry out any work activities; may need help with IADLs up and about > 50% of waking hours)    Prognosis, Goals, and/or Advance Care Planning:  We discussed general treatment options (full/restorative, selective/conservatives, and comfort only/hospice). We then discussed how these specifically apply to Roula.  Based on this discussion, Roula has decided to continue with the NG and give time to process the information she was given today along with getting time to meet with her oncology doctor and with her palliative care doctor. We discussed at this time she is really feeling that she most likely will not be able to get any more treatments. We discussed that in the event that it is confirmed by her cancer doctor she is not a candidate for treatment, then an NG does not make sense at that time. Discussed the option at that time will be hospice cares at home. She was open and receptive to discussing hospice services at home and the philosophy, she wonders about ways that she could go comfortable and wonders what it might be like to end her life with medications she may get from hospice, we discussed in the states that have aid in dying,  "typically most often people who get the medication do not end up taking it but find it a source of comfort to know there is a \"easier\" way out. We discussed alternatives available with hospice including helping with suffering with the multidisciplinary approach and medications, along with palliative sedation and VSED. We discussed ultimately at this time she wants to give the NG a trial for a week or two and that gives her time to also talk with her team members who know her much better and can give additional guidance.   Discussed what continuing restorative/life-prolonging care entails, including continued (re)admissions to the hospital, continuing with preventative and primary care, seeing disease/organ specific specialty consultations for medical treatments in hopes to prolong life for as long as possible.    Education provided on transition to comfort-focused goals of care would be including discontinuation of IV fluids, cardiac monitoring, labs, tube feeding, TPN and other interventions that do not directly promote comfort.  Anticipatory guidance was given regarding feeding, hunger, fluids at end of life. We discussed utilization of medications to ease air hunger, agitation and restlessness at the time that ventilator is compassionately withdrawn.  Discussed that this process is very purposeful in terms of ensuring patient is as comfortable as possible and that family wishes are honored.    Code Status was addressed today:   Yes, We discussed potential risks and rationale of attempting cardiac resuscitation, intubation, and mechanical ventilation.  We also discussed probability of survival as well as quality of life implications.  Based on this discussion, patient or surrogate response/decision: DNR/DNI      Patient's decision making preferences: shared with support from loved ones        Patient has decision-making capacity today for complex decisions: Intact           Coping, Meaning, & Spirituality:   Mood, " coping, and/or meaning in the context of serious illness were addressed today: Yes had a really hard hospital stay and things did not go as planned and frustrated with communication issues.    Social:   Important relationships/caregivers: Spouse    Medications:  Reviewed this patient's medication profile and medications from this hospitalization. Notable medications:\   [place] Board of Pharmacy Data Base Reviewed: Yes:   reviewed - controlled substances prescribed by other outside provider(s)..    ROS:  Comprehensive ROS is reviewed and is negative except as here & per HPI:     Physical Exam   Vital Signs with Ranges  Temp:  [97.7  F (36.5  C)-99  F (37.2  C)] 97.7  F (36.5  C)  Pulse:  [114-125] 114  Resp:  [16-18] 18  BP: (102-106)/(70-74) 106/74  SpO2:  [95 %-97 %] 97 %  Wt Readings from Last 10 Encounters:   07/16/25 50.4 kg (111 lb 1.8 oz)   07/14/25 49.2 kg (108 lb 7.5 oz)   07/07/25 51.4 kg (113 lb 5.1 oz)   07/03/25 52.1 kg (114 lb 13.8 oz)   07/01/25 53.2 kg (117 lb 4.6 oz)   06/30/25 52.9 kg (116 lb 10 oz)   06/10/25 56.1 kg (123 lb 11.2 oz)   02/19/25 62.9 kg (138 lb 11.2 oz)   09/06/24 64.4 kg (142 lb)     111 lbs 1.79 oz    PHYSICAL EXAM:  Constitutional: Awake, alert, cooperative, no apparent distress.  Lungs: No increased work of breathing, good air exchange  Neurologic: Awake, alert, oriented to name, place and time.   Neuropsychiatric: Normal affect, mood, orientation, memory and insight.  Skin: No rashes, erythema      Data reviewed:  Recent Results (from the past 24 hours)   UA with Microscopic reflex to Culture    Specimen: Urine, NOS   Result Value Ref Range    Color Urine Yellow Colorless, Straw, Light Yellow, Yellow    Appearance Urine Clear Clear    Glucose Urine Negative Negative mg/dL    Bilirubin Urine Negative Negative    Ketones Urine Trace (A) Negative mg/dL    Specific Gravity Urine 1.012 1.003 - 1.035    Blood Urine Negative Negative    pH Urine 7.5 (H) 5.0 - 7.0    Protein  Albumin Urine Negative Negative mg/dL    Urobilinogen Urine 3.0 (A) Normal mg/dL    Nitrite Urine Negative Negative    Leukocyte Esterase Urine Negative Negative    Bacteria Urine Few (A) None Seen /HPF    Mucus Urine Present (A) None Seen /LPF    RBC Urine <1 <=2 /HPF    WBC Urine 2 <=5 /HPF    Hyaline Casts Urine 1 <=2 /LPF    Narrative    Urine Culture not indicated   Magnesium   Result Value Ref Range    Magnesium 1.8 1.7 - 2.3 mg/dL   Phosphorus   Result Value Ref Range    Phosphorus 2.0 (L) 2.5 - 4.5 mg/dL   Basic metabolic panel   Result Value Ref Range    Sodium 133 (L) 135 - 145 mmol/L    Potassium 3.9 3.4 - 5.3 mmol/L    Chloride 96 (L) 98 - 107 mmol/L    Carbon Dioxide (CO2) 25 22 - 29 mmol/L    Anion Gap 12 7 - 15 mmol/L    Urea Nitrogen 4.1 (L) 6.0 - 20.0 mg/dL    Creatinine 0.37 (L) 0.51 - 0.95 mg/dL    GFR Estimate >90 >60 mL/min/1.73m2    Calcium 8.1 (L) 8.8 - 10.4 mg/dL    Glucose 135 (H) 70 - 99 mg/dL   Echo Complete   Result Value Ref Range    LVEF  60-65%     Narrative    [id number]  [id number]  [id number]  [id number]^RIMA^HOLLIE^     [facility]  Echocardiography Laboratory  [address] [place] [id number]     Name: KAYE MORENO  MRN: [id number]  : 1975  Study Date: 2025 12:31 PM  Age: 50 yrs  Gender: Female  Patient [facility]  Reason For Study: Abn EKG  Ordering Physician: HOLLIE ABARCA  Performed By: Philippe Simms RDCS     BSA: 1.5 m2  Height: 64 in  Weight: 111 lb  HR: 115  ______________________________________________________________________________  Procedure  Echocardiogram with two-dimensional, color and spectral Doppler.  ______________________________________________________________________________  Interpretation Summary  Left ventricular size, wall motion and function are normal. The ejection  fraction is 60-65%.  Right ventricular function, chamber size, wall motion, and thickness are  normal.  Trace to mild mitral  insufficiency is present.  The inferior vena cava was normal in size with preserved respiratory  variability. No pericardial effusion is present.     No significant changes noted.  ______________________________________________________________________________  Left Ventricle  Left ventricular size, wall motion and function are normal. The ejection  fraction is 60-65%. Left ventricular diastolic function is normal. No regional  wall motion abnormalities are seen.     Right Ventricle  Right ventricular function, chamber size, wall motion, and thickness are  normal.     Atria  Both atria appear normal.     Mitral Valve  Trace to mild mitral insufficiency is present.     Aortic Valve  Aortic valve is normal in structure and function. The aortic valve is  tricuspid. No aortic regurgitation is present.     Tricuspid Valve  The tricuspid valve is normal. Trace tricuspid insufficiency is present. The  peak velocity of the tricuspid regurgitant jet is not obtainable.     Pulmonic Valve  The pulmonic valve is normal.     Vessels  The aorta root is normal. The inferior vena cava was normal in size with  preserved respiratory variability.     Pericardium  No pericardial effusion is present.     Compared to Previous Study  No significant changes noted.  ______________________________________________________________________________  MMode/2D Measurements & Calculations  IVSd: 0.97 cm  LVIDd: 4.4 cm  LVIDs: 2.8 cm  LVPWd: 0.81 cm  FS: 37.0 %  LV mass(C)d: 124.9 grams  LV mass(C)dI: 82.0 grams/m2  Ao root diam: 3.7 cm  asc Aorta Diam: 3.3 cm  LVOT diam: 2.4 cm  LVOT area: 4.5 cm2  Ao root diam index Ht(cm/m): 2.3  Ao root diam index BSA (cm/m2): 2.4  Asc Ao diam index BSA (cm/m2): 2.2  Asc Ao diam index Ht(cm/m): 2.0  LA Volume (BP): 34.3 ml     LA Volume Index (BP): 22.6 ml/m2  RWT: 0.37     ______________________________________________________________________________  Report approved by: LINDSAY REARDON MD on 07/17/2025 02:28 PM            Recent Labs   Lab 25  0657 25  1605 25  0522 07/15/25  1222 07/15/25  0608 25  1356 25  1058   WBC  --   --   --   --  7.6  --  8.0   HGB  --   --   --   --  8.7*  --  9.7*   MCV  --   --   --   --  78  --  76*   PLT  --   --   --   --  443  --  506*   INR  --   --   --   --   --   --  1.36*   * 132* 129*   < > 132*   < > 129*   POTASSIUM 3.9 3.8  3.8 3.2*   < > 3.6   < > 3.8   CHLORIDE 96* 94* 92*   < > 94*   < > 89*   CO2 25 26 23   < > 19*   < > 19*   BUN 4.1* 3.6* 3.0*   < > 3.4*   < > 6.4   CR 0.37* 0.37* 0.41*   < > 0.39*   < > 0.45*   ANIONGAP 12 12 14   < > 19*   < > 21*   EBONIE 8.1* 8.2* 8.4*   < > 8.3*   < > 9.3   * 109* 115*   < > 87   < > 84   ALBUMIN  --   --   --   --  3.2*  --  3.7   PROTTOTAL  --   --   --   --  6.7  --  7.4   BILITOTAL  --   --   --   --  0.4  --  0.5   ALKPHOS  --   --   --   --  163*  --  174*   ALT  --   --   --   --  13  --  12   AST  --   --   --   --  67*  --  57*   LIPASE  --   --   --   --   --   --  23    < > = values in this interval not displayed.       Recent Results (from the past 24 hours)   Echo Complete   Result Value    LVEF  60-65%    [facility]    [id number]  [id number]  [id number]  [id number]^RIMA^HOLLIE^     [facility]  Echocardiography Laboratory  [address] [place] [id number]     Name: KAYE MORENO  MRN: [id number]  : 1975  Study Date: 2025 12:31 PM  Age: 50 yrs  Gender: Female  Patient Location: [facility]  Reason For Study: Abn EKG  Ordering Physician: HOLLIE ABARCA  Performed By: Philippe Simms RDCS     BSA: 1.5 m2  Height: 64 in  Weight: 111 lb  HR: 115  ______________________________________________________________________________  Procedure  Echocardiogram with two-dimensional, color and spectral Doppler.  ______________________________________________________________________________  Interpretation Summary  Left ventricular size, wall motion and  function are normal. The ejection  fraction is 60-65%.  Right ventricular function, chamber size, wall motion, and thickness are  normal.  Trace to mild mitral insufficiency is present.  The inferior vena cava was normal in size with preserved respiratory  variability. No pericardial effusion is present.     No significant changes noted.  ______________________________________________________________________________  Left Ventricle  Left ventricular size, wall motion and function are normal. The ejection  fraction is 60-65%. Left ventricular diastolic function is normal. No regional  wall motion abnormalities are seen.     Right Ventricle  Right ventricular function, chamber size, wall motion, and thickness are  normal.     Atria  Both atria appear normal.     Mitral Valve  Trace to mild mitral insufficiency is present.     Aortic Valve  Aortic valve is normal in structure and function. The aortic valve is  tricuspid. No aortic regurgitation is present.     Tricuspid Valve  The tricuspid valve is normal. Trace tricuspid insufficiency is present. The  peak velocity of the tricuspid regurgitant jet is not obtainable.     Pulmonic Valve  The pulmonic valve is normal.     Vessels  The aorta root is normal. The inferior vena cava was normal in size with  preserved respiratory variability.     Pericardium  No pericardial effusion is present.     Compared to Previous Study  No significant changes noted.  ______________________________________________________________________________  MMode/2D Measurements & Calculations  IVSd: 0.97 cm  LVIDd: 4.4 cm  LVIDs: 2.8 cm  LVPWd: 0.81 cm  FS: 37.0 %  LV mass(C)d: 124.9 grams  LV mass(C)dI: 82.0 grams/m2  Ao root diam: 3.7 cm  asc Aorta Diam: 3.3 cm  LVOT diam: 2.4 cm  LVOT area: 4.5 cm2  Ao root diam index Ht(cm/m): 2.3  Ao root diam index BSA (cm/m2): 2.4  Asc Ao diam index BSA (cm/m2): 2.2  Asc Ao diam index Ht(cm/m): 2.0  LA Volume (BP): 34.3 ml     LA Volume Index (BP): 22.6  ml/m2  RWT: 0.37     ______________________________________________________________________________  Report approved by: LINDSAY REARDON MD on 07/17/2025 02:28 PM             Medical Decision Making       120 MINUTES SPENT BY ME on the date of service doing chart review, history, exam, documentation & further activities per the note.

## 2025-07-17 NOTE — PLAN OF CARE
Goal Outcome Evaluation:      Plan of Care Reviewed With: patient    Overall Patient Progress: no changeOverall Patient Progress: no change    Outcome Evaluation: .          VS: Temp: 99  F (37.2  C) Temp src: Oral BP: 106/74 Pulse: (!) 122   Resp: 18 SpO2: 95 % O2 Device: None (Room air)         O2: Stable @ RA,Denies SOB ,Chest pain   Output: Continent of b/b   Last BM: 7/11/25   Activity: Independent in room   Skin: R.Central [place] Access   Pain: Pain managed with Advil    CMS: [id number]   Dressing: C/D/I Central line dressing   Diet: Continuous feeding changed @ 3am to 45ml/hr   LDA: R.port Heparin locked   Equipment: Personal belongings   Plan: Continue with POC   Additional Info: RN Managed K,Mag,Ph.  Potasium & Phosphorus replaced

## 2025-07-17 NOTE — PROGRESS NOTES
[facility]    Medicine Progress Note - Hospitalist Service, GOLD TEAM 22    Date of Admission:  7/14/2025    Assessment & Plan    Roula Darling is a 50 year old female with a past medical history of stage IV metastatic rectal adenocarcinoma who presents to the ED from her oncology clinic appointment with lactic acidosis and concerns for malnutrition.  She remains admitted for management of malnutrition.    Changes today:  - NGT feeds at goal but continuous, depending on discharge plan may consolidate feeds if tolerates  - palliative consult today for discussion of surgical GJT placement, determine if in line with GO based on oncology prognostication of eligibility for research study treatment continuation with improved nutrition  - has been tachycardic low 120s, sinus tach persistently. EKG with some inferior lead changes c/f prior infarct but new since June 2025. Also with T wave inversions in inferolateral leads and some evidence of left atrial enlargement. No TTE on file. Obtaining echo  - HypoNa resolving with feeds and small bolus yesterday, suspect will normalize by tomorrow  - cont BID refeeding labs for now as lytes still not stable  - initially patient feeling that she wanted NGT out today and to discharge without nutrition plan, but following palliative consult opted to stay to get teaching from nursing about home tube feeding before leaving  - [facility] care mgmt consult for tube feed initiation with hopeful goal of discharging tomorrow    Failure to Thrive  Severe protein-calorie malnutrition  Anion Gap Metabolic Acidosis   Starvation Ketoacidosis   At Risk for Refeeding Syndrome   Hypovolemic Hyponatremia  Patient presenting from outpatient clinic appointment d/t anorexia.  Treatment deferred today due deconditioning.  Lactic acid WNLs, however with ketosis and anion gap metabolic acidosis. S/p 2Ls NS in clinic, receiving 1L NS per ED.  Suspect starvation  ketoacidosis in setting of metastatic cancer.   - nutrition consulted  - NGT feeds at goal rate and volume but continuous, depending on discharge plan may consolidate feeds if tolerates  - cont thiamine, folate, and multivitamins  - high protein, high kcal diet  - monitoring electrolytes q12h for refeeding risk    Stage IV Metastatic Rectal Adenocarcinoma - Follows with Dr. Hernandez at [facility], follows with Dr. Deleon with [facility] for TOR clinical trial. Please refer to outpatient note 7/8/25 for full treatment history.   -Oncology consult while inpatient  - palliative consult 7/17 for discussion of surgical GJT placement, determine if in line with GOC based on oncology prognostication of eligibility for research study treatment continuation with improved nutrition  -Antiemetics:   -Zofran   -compazine   -ativan  -continue PRN hydromorphone  -consider palliative care consult pending above interventions/discussions     Hyponatremia  suspect multifactorial hypovolemic plus iatrogenic with feeds plus free water, but will give a small bolus NS to ensure hydration and decrease free water intake while uptitrating feeds to goal rate  - bolus 500 ml NS  - limit free water to <1L  - BID BMP as above          Diet: High Kcal/High Protein Diet, ADULT  Adult Formula Drip Feeding: Continuous Jevity 1.5; Nasogastric tube; Goal Rate: 45; start @ 15 ml/hr, increase by 10 ml/hr q 12 hr (if meets refeeding electrolyte goals).; mL/hr; Add only 8 hr worth of form (8 x rate) to feeding bag (8 hr hang cyn...  Diet    DVT Prophylaxis: Pneumatic Compression Devices  [place] Catheter: Not present  Lines: PRESENT      Port a Cath 02/17/23 Single Lumen Right Chest wall-Site Assessment: WDL      Cardiac Monitoring: None  Code Status: No CPR- Do NOT Intubate      Clinically Significant Risk Factors        # Hypokalemia: Lowest K = 3.2 mmol/L in last 2 days, will replace as needed  # Hyponatremia: Lowest Na = 129 mmol/L in last 2 days,  will monitor as appropriate  # Hypochloremia: Lowest Cl = 92 mmol/L in last 2 days, will monitor as appropriate      # Hypoalbuminemia: Lowest albumin = 3.2 g/dL at 7/15/2025  6:08 AM, will monitor as appropriate                   # Severe Malnutrition: based on nutrition assessment and treatment provided per dietitian's recommendations., PRESENT ON ADMISSION          Social Drivers of Health    Depression: At risk (6/10/2025)    PHQ-2     PHQ-2 Score: 3   Tobacco Use: Medium Risk (7/8/2025)    Received from [employer]    Patient History     Smoking Tobacco Use: Former     Smokeless Tobacco Use: Never          Disposition Plan     Medically Ready for Discharge: Anticipated in 2-4 Days             Sha Linder MD  Hospitalist Service, GOLD TEAM 22  [facility]  Securely message with [employer] (more info)  Text page via [facility] Paging/Directory   See signed in provider for up to date coverage information  ______________________________________________________________________    Interval History   As above. Naeo.     Physical Exam   Vital Signs: Temp: 97.7  F (36.5  C) Temp src: Tympanic BP: 106/74 Pulse: 114   Resp: 18 SpO2: 97 % O2 Device: None (Room air)    Weight: 111 lbs 1.79 oz      Gen: cachectic, chronically ill-appearing. Pale. Anxious appearing, alert. NGT in place.   CV: sinus tach 120, no murmurs  Pulm: no increased WOB  Abd: soft, nl BS  Extremities: no edema      Medical Decision Making             Data     I have personally reviewed the following data over the past 24 hrs:    N/A  \   N/A   / N/A     133 (L) 96 (L) 4.1 (L) /  135 (H)   3.9 25 0.37 (L) \       Imaging results reviewed over the past 24 hrs:   No results found for this or any previous visit (from the past 24 hours).

## 2025-07-17 NOTE — PLAN OF CARE
Goal Outcome Evaluation:      Plan of Care Reviewed With: patient, spouse    Overall Patient Progress: no changeOverall Patient Progress: no change

## 2025-07-17 NOTE — PLAN OF CARE
Goal Outcome Evaluation:      Plan of Care Reviewed With: patient, spouse    Outcome Evaluation: Anticipate discharge to home when medically ready.  Care mgmt working to establish home infusion services for tube feeds.        Leo Fleming RN  8MS Nurse Care Coordinator  [facility] Acute Care Management  Phone: [phone]  Available on Vocera: 8 Med Surg Vocera

## 2025-07-17 NOTE — CONSULTS
Care Management Initial Consult    General Information  Assessment completed with: Patient, Spouse or significant other,    Type of CM/SW Visit: Initial Assessment    Primary Care Provider verified and updated as needed: No (reports that she has no PCP)   Readmission within the last 30 days: no previous admission in last 30 days      Reason for Consult: discharge planning (tube feeds)  Advance Care Planning: Advance Care Planning Reviewed: questions answered, other (see comments) (short-form HCD document provided)          Communication Assessment  Patient's communication style: spoken language (English or Bilingual)    Hearing Difficulty or Deaf: no   Wear Glasses or Blind: no    Cognitive  Cognitive/Neuro/Behavioral: WDL                      Living Environment:   People in home: spouse, child(ahmet), dependent (2 teenage children)     Current living Arrangements: house      Able to return to prior arrangements: yes       Family/Social Support:  Care provided by: self, spouse/significant other  Provides care for: child(ahmet)  Marital Status:   Support system: , Children, Other (specify) ('s side of family)  : Silas       Description of Support System: Supportive, Involved    Support Assessment: Adequate family and caregiver support    Current Resources:   Patient receiving home care services: No        Community Resources: None  Equipment currently used at home: none  Supplies currently used at home: None    Employment/Financial:  Employment Status: unemployed        Financial Concerns: none   Referral to Financial Worker: No       Does the patient's insurance plan have a 3 day qualifying hospital stay waiver?  Yes     Which insurance plan 3 day waiver is available? Alternative insurance waiver    Will the waiver be used for post-acute placement? No    Lifestyle & Psychosocial Needs:  Social Drivers of Health     Food Insecurity: Low Risk  (7/14/2025)    Food Insecurity     Within the past  12 months, did you worry that your food would run out before you got money to buy more?: No     Within the past 12 months, did the food you bought just not last and you didn t have money to get more?: No   Depression: At risk (6/10/2025)    PHQ-2     PHQ-2 Score: 3   Housing Stability: Low Risk  (7/14/2025)    Housing Stability     Do you have housing? : Yes     Are you worried about losing your housing?: No   Tobacco Use: Medium Risk (7/8/2025)    Received from [facility]    Patient History     Smoking Tobacco Use: Former     Smokeless Tobacco Use: Never     Passive Exposure: Not on file   Financial Resource Strain: Low Risk  (7/14/2025)    Financial Resource Strain     Within the past 12 months, have you or your family members you live with been unable to get utilities (heat, electricity) when it was really needed?: No   Alcohol Use: Not At Risk (8/26/2020)    Received from [facility]    AUDIT-C     Frequency of Alcohol Consumption: Never     Average Number of Drinks: Not on file     Frequency of Binge Drinking: Not on file   Transportation Needs: Low Risk  (7/14/2025)    Transportation Needs     Within the past 12 months, has lack of transportation kept you from medical appointments, getting your medicines, non-medical meetings or appointments, work, or from getting things that you need?: No   Physical Activity: Not on file   Interpersonal Safety: Low Risk  (7/14/2025)    Interpersonal Safety     Do you feel physically and emotionally safe where you currently live?: Yes     Within the past 12 months, have you been hit, slapped, kicked or otherwise physically hurt by someone?: No     Within the past 12 months, have you been humiliated or emotionally abused in other ways by your partner or ex-partner?: No   Stress: Not on file   Social Connections: Not on file   Health Literacy: Not on file       Functional Status:  Prior to admission patient needed assistance:   Dependent ADLs:: Independent  Dependent  IADLs:: Independent  Assesssment of Functional Status:  (At baseline, pt/spouse report pt was independent, but d/t recent health complications, pt reports she is requiring more assistance, however did not specify with what)    Mental Health Status:  Mental Health Status: No Current Concerns (per pt statement)       Chemical Dependency Status:  Chemical Dependency Status: No Current Concerns (per pt statement)             Values/Beliefs:  Spiritual, Cultural Beliefs, Denominational Practices, Values that affect care: no               Discussed  Partnership in Safe Discharge Planning  document with patient/family: No    Additional Information:  Case discussed in rounds this AM.  Care team mentioned that pt is deciding upon whether or not to pursue tube feeds.  In order to help w/ her decision-making, this writer was requested to check insurance coverage for tube feeds.  At present, if pt chooses to pursue home tube feeds, it is unknown what type of feeding tube pt would ultimately discharge with.    Received update from RD that pt was inquiring about removal of NG tube and going home.  Unclear if pt will proceed w/ this.  RD to notify provider.    Opted to proceed w/ tube feed benefit check, in the event pt opts to remain in-hospital and further consider tube feeds for after discharge.  Placed home infusion investigation referral for [facility].  Subsequently notified [facility] liaison of new referral and that it is presently unknown if pt will pursue tube feeds.  Requested that if possible, benefits be checked for TFs through both NG and G tubes, as type of tube sometimes impacts coverage.    Received update from [facility] liaison that she was informed by RD that pt does not want to proceed w/ TFs.    Reached out to RD, she advised following up w/ bedside RN for most up-to-date info, as pt may have changed her mind since RD's discussion w/ her.    Reached out to bedside RN, she reported that pt would like to do 2 weeks of  TFs to see how it goes.  RN reported that she has updated the provider.  This writer updated RD and I liaison to this info.    CM/SW consult was placed by provider this afternoon to address tube feeds.    Met w/ pt and her spouse, Silas, at bedside.  Introduced self and role, conducted CMA, and discussed discharge planning process.  See flowsheets for CMA data.  Confirmed home address.  Pt does not have a PCP.  Regarding HCD, pt expressed interest in looking over a form, may/may not complete it.  Provided short-form HCD, advised that if she opts to fill it out that she should not sign/date it, but rather alert staff that it is completed so that a notary can be arranged, pt/spouse expressed understanding.  Discussed TF referral to [facility] and explained that if insurance coverage is poor, or simply at the request of family, this writer will check w/ alternative agencies for coverage.  Explained general process in establishing home tube feeds, explained that the selected agency could further discuss details once applicable.  Pt's spouse had a number of logistics-based questions, explaining that he does not like surprise situations or surprise costs.  This writer provided answers to the best of this writer's ability, however advised checking w/ selected infusion agency once applicable, he expressed understanding.  By end of discussion, pt/spouse had no further questions/concerns for this writer and understood that step one in this process is to determine insurance coverage for the tube feeds, at which point further planning can take place.      Next Steps:  - Monitor result of [facility] tube feed benefit check; if insurance coverage is poor/non-existent, refer elsewhere (i.e., [facility])    - Determine who will be managing tube feeds in the outpatient setting, as the selected infusion agency will want this information    - Keep pt/spouse updated to discharge coordination progress, as applicable    - If pt  opts to complete HCD, request major Fleming, RN  8MS Nurse Care Coordinator  [facility]  Phone: [phone]  Available on Vocera: 8 Med Surg Vocera

## 2025-07-17 NOTE — PROGRESS NOTES
Solid Tumor Oncology Consult Service  Progress Note   Date of Service: 07/17/2025  Patient: Roula Darling  MRN: [id number]  Admission Date: 7/14/2025  Hospital Day # 3  Cancer Diagnosis: Rectal Cancer   Primary Outpatient Oncologist: Kiki Hernandez   Current Treatment Plan: Research trial [id number]        Summary & Recommendations:   - Continue to monitor refeeding labs  - Patient will need a stable nutrition plan prior to discharge; we discussed options including g tube; I would recommend she get information about G tube.  She and her  are trying to decide if this is something they would want to pursue.    - As for more cancer treatment, she is scheduled to see Dr Kelly next Tuesday 7/22 for her second dose of TORL.  At this time, Roula is not ready mentally to not pursue therapy, and as such it may be most useful to optimize her physical function and then have her return for visit next week.  Alternatively, pursuing comfort measures only is a reasonable choice.  Roula and her  need more time to think through all of this.    - Please place palliative care consult for ongoing goals of care discussions.  - it may be useful to have a joint care conference with teams    Addendum:  I spoke with the patient's outside oncologist, Dr SHARRI Campoverde.  We discussed the options of home hospice and home palliative care if she discharges.  I also placed a call to Dr Rdz, outpatient palliative care provider.  Updated hospitalist team.      Carole Holly MD     Assessment & Plan:   Roula Darling is a 50 year old female with past medical history of stage IV metastatic rectal carcinoma who was admitted for malnutrition, ketosis, and failure to thrive in the setting of metastatic cancer. Interval increase in Ca19-9 and increased tumor burden on CT scan suggest progression.       # Stage IV Metastatic Rectal Adenocarcinoma   Follows with Dr. Hernandez at [facility], follows with Dr. Deleon with [facility]  for TOR clinical trial. CT-CAP suggestive of progression.  We had a long conversation that her CT scan does demonstrate progressive disease.  It is not the routine time for doing restaging scans on a clinical trial however it was done at an earlier time.  Given her symptoms.  We discussed that right now it would be reasonable to not pursue further therapy.  Given how weak she is, doing more therapy may increase the chances of her being hospitalized and remaining hospitalized and away from her children and family.  In the event that she would want to pursue additional treatment, then optimizing her physical function with nutrition would be important.  In my discussions with our clinical trial team, we could consider 1 additional dose of TORL if she was strong enough.  She is very borderline for any additional therapy at this time.  I discussed this with Dr. Grant Lora, PI of the clinical trial.  Not pursuing additional therapy would also be a reasonable choice.    My discussions with Roula and her  today, she would like to pursue more therapy if possible.  She is trying to process overall how much she has declined and what her overall life expectancy is.  She feels that without any nutrition, she does not have a long time.  I discussed with her that not having an appetite and not eating and drinking a lot, is often a sign of cancer progression and is a normal part of disease progression.    She does work with Charles Rdz palliative care at [facility].  I recommended that palliative care be consulted and see her while she is in the hospital here to aid with goals of care, as well as optimizing pain control.  She was agreeable.    AGMA 2/2 starvation ketosis  Failure to thrive   Suspected 2/2 caloric demand of metastatic cancer in combination with poor PO intake and nausea/vomiting. At thsi time, she is receiving nutritional support via feeding tube. Potassium has declined overnight, suggesting high risk  "of refeeding syndrome.   - Continue nutritional support per dietician  - Monitor for refeeding syndrome, likely ~48 hours. Patient will need a stable nutrition plan prior to discharge.   - recommending getting information on G tubes as they are trying to decide if they want to pursue this.         Oncologic History:    She was diagnosed with stage IV rectal carcinoma he is in early 2023, and received palliative radiation at [address] to the pelvis.  From February 22 to May 1 she received FOLFOX chemotherapy every 2 weeks, and then from May 1 she received only 5 fluorouracil due to a reaction to oxaliplatin that necessitated the use of an EpiPen.  She failed oxaliplatin desensitization on 5/8/2023.  From 5/22/2023 to 1/16/2024, she received FOLFIRI with G-CSF support every 2 weeks however on this regimen she had progressive disease in the lungs.  From February 6 to April 2, 2024 she received Lonsurf with bevacizumab.  From April 20 to May 20, she received regorafinib.  in June 2024, she began the clinical trial of XTX-301.  In August 2024 she was desensitized for oxaliplatin, and then received FOLFOX until February 2025, after which she received 5-fluorouracil alone until March.  From April through June she received Fruqintinib, and then on 6/30/2025, she began a clinical trial of T ORL-1-23 at [facility]. She received 1 dose at 2.4 mg/kg for a total of 127 mg on 6/30/25.            Carole Holly MD       ___________________________________________________________________    Subjective & Interval History:    No acute events noted overnight. Pt feeling off this morning, mildly dizzy, overall fatigued with mild epigastric discomfort.    She is afraid to take dilaudid concerned about her bowel movements.      Physical Exam:    Blood pressure 106/74, pulse (!) 122, temperature 97.8  F (36.6  C), temperature source Oral, resp. rate 18, height 1.645 m (5' 4.76\"), weight 50.4 kg (111 lb 1.8 oz), SpO2 95%.        Labs & Studies: I " personally reviewed the following studies:  ROUTINE LABS (Last four results):  CMP  Recent Labs   Lab 07/17/25  0657 07/16/25  1605 07/16/25  0522 07/15/25  2021 07/15/25  1222 07/15/25  0608 07/14/25  1356 07/14/25  1058   * 132* 129* 130*  --  132*   < > 129*   POTASSIUM 3.9 3.8  3.8 3.2* 3.4  --  3.6   < > 3.8   CHLORIDE 96* 94* 92* 92*  --  94*   < > 89*   CO2 25 26 23 22  --  19*   < > 19*   ANIONGAP 12 12 14 16*  --  19*   < > 21*   * 109* 115* 100*   < > 87   < > 84   BUN 4.1* 3.6* 3.0* 2.3*  --  3.4*   < > 6.4   CR 0.37* 0.37* 0.41* 0.35*  --  0.39*   < > 0.45*   GFRESTIMATED >90 >90 >90 >90  --  >90   < > >90   EBONIE 8.1* 8.2* 8.4* 8.2*  --  8.3*   < > 9.3   MAG 1.8 1.9 1.8 1.8  --  1.9  --  1.9   PHOS 2.0* 2.3* 2.5 2.6  --  2.8  --  3.2   PROTTOTAL  --   --   --   --   --  6.7  --  7.4   ALBUMIN  --   --   --   --   --  3.2*  --  3.7   BILITOTAL  --   --   --   --   --  0.4  --  0.5   ALKPHOS  --   --   --   --   --  163*  --  174*   AST  --   --   --   --   --  67*  --  57*   ALT  --   --   --   --   --  13  --  12    < > = values in this interval not displayed.     CBC  Recent Labs   Lab 07/15/25  0608 07/14/25  1058   WBC 7.6 8.0   RBC 3.53* 4.01   HGB 8.7* 9.7*   HCT 27.4* 30.5*   MCV 78 76*   MCH 24.6* 24.2*   MCHC 31.8 31.8   RDW 18.5* 18.6*    506*     INR  Recent Labs   Lab 07/14/25  1058   INR 1.36*     Medications list for reference:  Current Facility-Administered Medications   Medication Dose Route Frequency Provider Last Rate Last Admin    acetaminophen (TYLENOL) tablet 975 mg  975 mg Oral Q8H PRN Katherin Jones MD        benzonatate (TESSALON) capsule 100 mg  100 mg Oral TID PRN Rosette Mckeon PA-C        calcium carbonate (TUMS) chewable tablet 1,000 mg  1,000 mg Oral or Feeding Tube 4x Daily PRN Sha Linder MD        dextrose 10% infusion   Intravenous Continuous PRN Sha Linder MD        heparin lock flush 10 unit/mL injection 5-10 mL  5-10 mL  Intracatheter Q1H PRN Lonnie Dubose MD   5 mL at 07/16/25 1705    heparin lock flush 10 unit/mL injection 5-10 mL  5-10 mL Intracatheter Q24H Lonnie Dubose MD   5 mL at 07/15/25 2020    heparin lock flush 100 unit/mL injection 5-10 mL  5-10 mL Intracatheter Q28 Days Lonnie Dubose MD        HYDROmorphone (DILAUDID) tablet 2 mg  2 mg Oral or Feeding Tube Q4H PRN Sha Linder MD   2 mg at 07/16/25 0802    Or    HYDROmorphone (DILAUDID) tablet 4 mg  4 mg Oral or Feeding Tube Q4H PRN Sha Linder MD        ibuprofen (ADVIL/MOTRIN) tablet 600 mg  600 mg Oral Q8H PRN Katherin Jones MD   600 mg at 07/17/25 0516    lidocaine (LMX4) cream   Topical Q1H PRN Rosette Mckeon PA-C        lidocaine 1 % 0.1-1 mL  0.1-1 mL Other Q1H PRN Rosette Mckeon PA-C        LORazepam (ATIVAN) tablet 0.5 mg  0.5 mg Oral or Feeding Tube Q6H PRN Sha Linder MD        multivitamins w/minerals liquid 15 mL  15 mL Oral or Feeding Tube Daily Sha Linder MD   15 mL at 07/17/25 0756    naloxone (NARCAN) injection 0.2 mg  0.2 mg Intravenous Q2 Min PRN Clovis Monge MD        Or    naloxone (NARCAN) injection 0.4 mg  0.4 mg Intravenous Q2 Min PRN Clovis Monge MD        Or    naloxone (NARCAN) injection 0.2 mg  0.2 mg Intramuscular Q2 Min PRN Clovis Monge MD        Or    naloxone (NARCAN) injection 0.4 mg  0.4 mg Intramuscular Q2 Min PRN Clovis Monge MD        OLANZapine (zyPREXA) tablet 5 mg  5 mg Oral or Feeding Tube At Bedtime Sha Linder MD   5 mg at 07/16/25 2103    omeprazole (PriLOSEC) CR capsule 20 mg  20 mg Oral QAM AC Sha Linder MD   20 mg at 07/17/25 0756    ondansetron (ZOFRAN ODT) ODT tab 8 mg  8 mg Oral Q8H PRN Sha Linder MD        Or    ondansetron (ZOFRAN) solution 8 mg  8 mg Oral Q8H PRN Sha Linder MD        polyethylene glycol (MIRALAX) Packet 17 g  17 g Oral or Feeding Tube Daily PRN Sha Linder MD   17 g at 07/17/25 075     prochlorperazine (COMPAZINE) tablet 10 mg  10 mg Oral or Feeding Tube Q6H PRN Sha Linder MD        senna-docusate (SENOKOT-S/PERICOLACE) 8.6-50 MG per tablet 1 tablet  1 tablet Oral or Feeding Tube BID PRN Sha Linder MD   1 tablet at 07/17/25 0758    Or    senna-docusate (SENOKOT-S/PERICOLACE) 8.6-50 MG per tablet 2 tablet  2 tablet Oral or Feeding Tube BID PRN Sha Linder MD        sodium chloride (PF) 0.9% PF flush 10-20 mL  10-20 mL Intracatheter q1 min prn Lonnie Dubose MD   10 mL at 07/15/25 2240    sodium chloride (PF) 0.9% PF flush 10-20 mL  10-20 mL Intracatheter Q1H PRN Lonnie Dubose MD   20 mL at 07/16/25 0517    sodium chloride (PF) 0.9% PF flush 10-20 mL  10-20 mL Intracatheter Q28 Days Lonnie Dubose MD        sodium chloride (PF) 0.9% PF flush 3 mL  3 mL Intracatheter Q8H Rosette Francis, PA-C   3 mL at 07/16/25 1704    thiamine (B-1) tablet 100 mg  100 mg Oral or Feeding Tube Daily Sha Linder MD   100 mg at 07/17/25 0756

## 2025-07-17 NOTE — PROGRESS NOTES
CLINICAL NUTRITION SERVICES - BRIEF NOTE       Registered Dietitian Interventions:  - Very briefly discussed w/ Roula and her  that if she chose to pursue a permanent PEG placement then this would eliminate issue of discomfort of nasal FT.  Answered their questions about logistics of continuous and transitioning to cycled feeding (discussed earlier in stay that she could transition to bolus feedings possibly in the future).   - After pt asked if she could have the NG removed and leave, I explained that was her right and that I'd notify provider.  Also notified provider of her Sx of room spinning when she closed her eyes.  Confirmed w/ pt in afternoon that she did wish to continue TF.  Explained I'd return tomorrow to review plan for TF at home and how to gradually transition to a cycled rate.   - Updated RN.      Future/Additional Recommendations:  Monitor labs for stable sodium and refeeding risk, stooling, weight trends, oral intake and enteral support tolerance      INFORMATION OBTAINED  Assessed patient in room.  at bedside.     CURRENT NUTRITION ORDERS  Diet: High Kcal/High Protein  Nutrition Support: Jevity 1.5 Earl (or equivalent) @ goal of  45ml/hr  (1080ml/day) provides: 1620 kcals, 68 g PRO, 820 ml free H20, 232 g CHO, and 22 g fiber daily.  TF reached goal of 45 ml/hr early this AM.  FWF: 30 ml q 4 hr (decreased to help w/ hyponatremia). FWF plus TF will provide total of 1000 ml free water.     CURRENT INTAKE/TOLERANCE  Pt states she didn't order any food yesterday.  She feels that her nose is irritated inside by NG tube.       NEW FINDINGS  Pt reported that the room was spinning when she closed her eyes this morning.  Per care rounds discussion, pt may be uncertain about [facility] re: nutrition after talking with oncologist this morning.  I spoke with her after this and she stated she wished to have her NG removed and discharge. However later agreed to continue TF and stay.  When I spoke w/ her  "later she apologized and said she wasn't thinking clearly this morning.      GI symptoms: No GI upset w/ TF at goal since this AM. No BM yet since last Friday but is passing a lot of gas.      Nutrition-relevant labs: Reviewed   Latest Reference Range & Units 07/14/25 13:56 07/15/25 06:08 07/15/25 20:21 07/16/25 05:22 07/16/25 16:05 07/17/25 06:57   Sodium 135 - 145 mmol/L 131 (L) 132 (L) 130 (L) 129 (L) 132 (L) 133 (L)   Potassium 3.4 - 5.3 mmol/L 3.7 3.6 3.4 3.2 (L) 3.8  3.8 3.9   Urea Nitrogen 6.0 - 20.0 mg/dL 5.2 (L) 3.4 (L) 2.3 (L) 3.0 (L) 3.6 (L) 4.1 (L)   Creatinine 0.51 - 0.95 mg/dL 0.41 (L) 0.39 (L) 0.35 (L) 0.41 (L) 0.37 (L) 0.37 (L)   Magnesium 1.7 - 2.3 mg/dL  1.9 1.8 1.8 1.9 1.8   Phosphorus 2.5 - 4.5 mg/dL  2.8 2.6 2.5 2.3 (L) 2.0 (L)   Osmolality 275 - 295 mmol/kg 269 (L)    268 (L)    (L): Data is abnormally low    Nutrition-relevant medications: Reviewed  500 ml NS bolus given yesterday AM for low sodium   500 ml NS bolus given again today d/t c/o room spinning when she closes or eyes.     MONITORING/EVALUATION  Progress toward goals will be monitored and evaluated per policy.    Daksha Severino RD, LD   6 & 8 Med/Surg RD  Mon-Fri Vocera: \"6 Med Surg Clinical Dietitian\" or \"8 Med Surg Clinical Dietitian\"  Weekend RD Vocera (Global): \"Weekend Holiday Clinical Dietitian\"        "

## 2025-07-18 VITALS
HEART RATE: 135 BPM | SYSTOLIC BLOOD PRESSURE: 104 MMHG | BODY MASS INDEX: 18.51 KG/M2 | TEMPERATURE: 98.9 F | OXYGEN SATURATION: 94 % | HEIGHT: 65 IN | RESPIRATION RATE: 18 BRPM | WEIGHT: 111.11 LBS | DIASTOLIC BLOOD PRESSURE: 66 MMHG

## 2025-07-18 LAB
ANION GAP SERPL CALCULATED.3IONS-SCNC: 15 MMOL/L (ref 7–15)
BUN SERPL-MCNC: 4.5 MG/DL (ref 6–20)
CALCIUM SERPL-MCNC: 8 MG/DL (ref 8.8–10.4)
CHLORIDE SERPL-SCNC: 93 MMOL/L (ref 98–107)
CREAT SERPL-MCNC: 0.35 MG/DL (ref 0.51–0.95)
EGFRCR SERPLBLD CKD-EPI 2021: >90 ML/MIN/1.73M2
GLUCOSE SERPL-MCNC: 113 MG/DL (ref 70–99)
HCO3 SERPL-SCNC: 24 MMOL/L (ref 22–29)
MAGNESIUM SERPL-MCNC: 1.7 MG/DL (ref 1.7–2.3)
PHOSPHATE SERPL-MCNC: 2 MG/DL (ref 2.5–4.5)
POTASSIUM SERPL-SCNC: 4.1 MMOL/L (ref 3.4–5.3)
SODIUM SERPL-SCNC: 132 MMOL/L (ref 135–145)

## 2025-07-18 PROCEDURE — 99239 HOSP IP/OBS DSCHRG MGMT >30: CPT | Performed by: STUDENT IN AN ORGANIZED HEALTH CARE EDUCATION/TRAINING PROGRAM

## 2025-07-18 PROCEDURE — 99233 SBSQ HOSP IP/OBS HIGH 50: CPT | Performed by: CLINICAL NURSE SPECIALIST

## 2025-07-18 PROCEDURE — 250N000011 HC RX IP 250 OP 636: Performed by: STUDENT IN AN ORGANIZED HEALTH CARE EDUCATION/TRAINING PROGRAM

## 2025-07-18 PROCEDURE — 83735 ASSAY OF MAGNESIUM: CPT | Performed by: PHYSICIAN ASSISTANT

## 2025-07-18 PROCEDURE — 80048 BASIC METABOLIC PNL TOTAL CA: CPT | Performed by: STUDENT IN AN ORGANIZED HEALTH CARE EDUCATION/TRAINING PROGRAM

## 2025-07-18 PROCEDURE — 250N000013 HC RX MED GY IP 250 OP 250 PS 637: Performed by: STUDENT IN AN ORGANIZED HEALTH CARE EDUCATION/TRAINING PROGRAM

## 2025-07-18 PROCEDURE — 84100 ASSAY OF PHOSPHORUS: CPT | Performed by: PHYSICIAN ASSISTANT

## 2025-07-18 PROCEDURE — 99233 SBSQ HOSP IP/OBS HIGH 50: CPT | Performed by: INTERNAL MEDICINE

## 2025-07-18 RX ORDER — LANOLIN ALCOHOL/MO/W.PET/CERES
100 CREAM (GRAM) TOPICAL DAILY
Qty: 60 TABLET | Refills: 0 | Status: SHIPPED | OUTPATIENT
Start: 2025-07-18

## 2025-07-18 RX ORDER — HEPARIN SODIUM,PORCINE 10 UNIT/ML
5-10 VIAL (ML) INTRAVENOUS EVERY 24 HOURS
Status: DISCONTINUED | OUTPATIENT
Start: 2025-07-18 | End: 2025-07-18 | Stop reason: HOSPADM

## 2025-07-18 RX ORDER — HEPARIN SODIUM,PORCINE 10 UNIT/ML
5-10 VIAL (ML) INTRAVENOUS
Status: DISCONTINUED | OUTPATIENT
Start: 2025-07-18 | End: 2025-07-18 | Stop reason: HOSPADM

## 2025-07-18 RX ORDER — BENZONATATE 100 MG/1
100 CAPSULE ORAL 3 TIMES DAILY PRN
Qty: 60 CAPSULE | Refills: 1 | Status: SHIPPED | OUTPATIENT
Start: 2025-07-18 | End: 2025-08-27

## 2025-07-18 RX ORDER — HEPARIN SODIUM (PORCINE) LOCK FLUSH IV SOLN 100 UNIT/ML 100 UNIT/ML
5-10 SOLUTION INTRAVENOUS
Status: DISCONTINUED | OUTPATIENT
Start: 2025-07-18 | End: 2025-07-18 | Stop reason: HOSPADM

## 2025-07-18 RX ADMIN — POTASSIUM & SODIUM PHOSPHATES POWDER PACK 280-160-250 MG 1 PACKET: 280-160-250 PACK at 08:46

## 2025-07-18 RX ADMIN — Medication 15 ML: at 08:46

## 2025-07-18 RX ADMIN — OMEPRAZOLE 20 MG: 20 CAPSULE, DELAYED RELEASE ORAL at 08:55

## 2025-07-18 RX ADMIN — POTASSIUM & SODIUM PHOSPHATES POWDER PACK 280-160-250 MG 1 PACKET: 280-160-250 PACK at 13:03

## 2025-07-18 RX ADMIN — HEPARIN 5 ML: 100 SYRINGE at 12:58

## 2025-07-18 RX ADMIN — HYDROMORPHONE HYDROCHLORIDE 2 MG: 2 TABLET ORAL at 10:19

## 2025-07-18 RX ADMIN — ACETAMINOPHEN 975 MG: 325 TABLET ORAL at 08:44

## 2025-07-18 RX ADMIN — THIAMINE HCL TAB 100 MG 100 MG: 100 TAB at 08:44

## 2025-07-18 ASSESSMENT — ACTIVITIES OF DAILY LIVING (ADL)
ADLS_ACUITY_SCORE: 32

## 2025-07-18 NOTE — PLAN OF CARE
"Goal Outcome Evaluation:      Plan of Care Reviewed With: patient    VS: /66   Pulse (!) 135   Temp 98.9  F (37.2  C)   Resp 18   Ht 1.645 m (5' 4.76\")   Wt 50.4 kg (111 lb 1.8 oz)   SpO2 94%   BMI 18.62 kg/m      O2: >92% on RA, no complaints of SOB or chest pain.   Output: Voiding w/o pain or difficulty to bathroom.   Last BM: 7/11/25   Activity: Independent.     Skin: Visible skin intact.   Pain: Managed with dilaudid PRN.   CMS: A&Ox4, denies numbness & tingling.   Dressing: N/A   Diet: NG tube feeding this morning running @45mL/hr.   NG removed prior to discharge per MD patient care order and patient request to discontinue feeding.   High Kcal/high protein diet.   LDA: R chest port a cath, de accessed port prior to discharge per order.   Equipment: Personal belongings   Plan: Discharge to home   Additional Info: Patient's temp 101.4 this morning, tylenol given and MD Melinda York notified. Temp 98.8 after recheck.  Phosphorus replaced today.      Pt discharged today at 1315 after being seen by MD.  Pt received all medications prescribed to discharge pharmacy: Yes.  Pt received AVS and discussed any questions with the nurse: Yes.  Pt discharged to home and got there with Spouse.  Pt signed for all medications: Yes.  Pt belongings returned: Yes.           "

## 2025-07-18 NOTE — PLAN OF CARE
Goal Outcome Evaluation:      Plan of Care Reviewed With: patient    Overall Patient Progress: no changeOverall Patient Progress: no change    Outcome Evaluation: .        VS: Temp: 98.1  F (36.7  C) Temp src: Oral BP: 103/73 Pulse: 107   Resp: 18 SpO2: 95 % O2 Device: None (Room air)      O2: Stable @ RA,Denies SOB ,Chest pain   Output: Continent of b/b   Last BM: 7/11/25   Activity: Independent in room   Skin: R.Central [place] Access   Pain: Pain managed with Advil    CMS: [id number]   Dressing: C/D/I Central line dressing   Diet: Continuous feeding @ 45ml/hr   LDA: R.port Heparin locked   Equipment: Personal belongings   Plan: Continue with POC   Additional Info: RN Managed K,Mag,Ph.

## 2025-07-18 NOTE — PROGRESS NOTES
"CLINICAL NUTRITION SERVICES - BRIEF NOTE       Registered Dietitian Interventions:  Noted pt decided to have NG removed and discharge w/o TF today.  Collaborated earlier in day w/ [facility] liaison and RNCC re: teaching for expected plan for discharge w/ TF via NG.    Future/Additional Recommendations:  N/a     CURRENT NUTRITION ORDERS  Diet: High Kcal/High Protein  Nutrition Support: Jevity 1.5 Earl (or equivalent) @ goal of  45ml/hr  (1080ml/day) provides: 1620 kcals, 68 g PRO, 820 ml free H20, 232 g CHO, and 22 g fiber daily.   FWF: 30 ml q 4 hr d/t lower sodium levels and some oral fluid intake.        NEW FINDINGS  GI symptoms: Reviewed  BM this AM.     Nutrition-relevant labs: Reviewed  Na- 132.  Phos 2.0-- on standard replacement protocol    Nutrition-relevant medications: Reviewed       MONITORING/EVALUATION  Progress toward goals will be monitored and evaluated per policy.    Daksha Severino RD, LD   6 & 8 Med/Surg RD  Mon-Fri Vocera: \"6 Med Surg Clinical Dietitian\" or \"8 Med Surg Clinical Dietitian\"  Weekend RD Vocera (Global): \"Weekend Holiday Clinical Dietitian\"        "

## 2025-07-18 NOTE — PROGRESS NOTES
Solid Tumor Oncology Consult Service  Progress Note   Date of Service: 07/18/2025  Patient: Roula Darling  MRN: [id number]  Admission Date: 7/14/2025  Hospital Day # 4  Cancer Diagnosis: Rectal Cancer   Primary Outpatient Oncologist: Kiki Hernandez   Current Treatment Plan: Research trial [id number]        Summary & Recommendations:     - Pt is inclined to discontinue NG feeds at time of discharge; hoping to discharge today.  She is still thinking through all of this.  - As for more cancer treatment, she is scheduled to see Dr Kelly next Tuesday 7/22 for her second dose of TORL.  It is not clear to me whether she wants to keep this appt or not.  - We discussed not pursuing additional therapy and focusing on comfort and palliative care.  We discussed having an outpatient hospice consultation, whether she pursues an additional dose of TORL or not, given they are information gathering and need more information around end of life care - both inpatient and at home.  She and Silas are in agreement.  Her outside oncologist Dr Campoverde was happy to arrange for this as well on discharge.     - Appreciate palliative care consult for ongoing goals of care discussions.          Carole Holly MD     Assessment & Plan:   Roula Darling is a 50 year old female with past medical history of stage IV metastatic rectal carcinoma who was admitted for malnutrition, ketosis, and failure to thrive in the setting of metastatic cancer. Interval increase in Ca19-9 and increased tumor burden on CT scan with progression despite TORL.     # Stage IV Metastatic Rectal Adenocarcinoma   Follows with Dr. Hernandez at [facility], follows with Dr. Deleon with [facility] for TOR clinical trial. CT-CAP suggestive of progression.  We had a long conversation that her CT scan does demonstrate progressive disease.   Given how weak she is and her overall frailty, doing more therapy may increase the chances of her being hospitalized and  remaining hospitalized and away from her children and family.  We discussed not pursuing additional therapy.       I spoke with her outpatient oncologist Dr SHARRI Campoverde yesterday.  She is happy to arrange outpatient hospice.  I placed a call to Charles Rdz but did not connect with him.       We discussed end of life and options of dying at home vs facility.  Given she has teenagers, she knows she will need more help at the end of life.  Right now, her goal is to get home and be at home for as long as possible.  She is concerned about cost of hospice and burden to her family.  We discussed insurance will typically cover hospice.  We also discussed there are free hospice houses in the [facility] such as [facility].      AGMA 2/2 starvation ketosis  Failure to thrive   Suspected 2/2 caloric demand of metastatic cancer in combination with poor PO intake and nausea/vomiting. At thistime, she is receiving nutritional support via feeding tube.    - Continue nutritional support per dietician  - Monitor for refeeding syndrome, today she does not think she wants to discharge with NG.    Tachycardia - likely secondary to cancer.        Oncologic History:    She was diagnosed with stage IV rectal carcinoma he is in early 2023, and received palliative radiation at [address] to the pelvis.  From February 22 to May 1 she received FOLFOX chemotherapy every 2 weeks, and then from May 1 she received only 5 fluorouracil due to a reaction to oxaliplatin that necessitated the use of an EpiPen.  She failed oxaliplatin desensitization on 5/8/2023.  From 5/22/2023 to 1/16/2024, she received FOLFIRI with G-CSF support every 2 weeks however on this regimen she had progressive disease in the lungs.  From February 6 to April 2, 2024 she received Lonsurf with bevacizumab.  From April 20 to May 20, she received regorafinib.  in June 2024, she began the clinical trial of XTX-301.  In August 2024 she was desensitized for oxaliplatin, and then received  "FOLFOX until February 2025, after which she received 5-fluorouracil alone until March.  From April through June she received Fruqintinib, and then on 6/30/2025, she began a clinical trial of T ORL-1-23 at [facility]. She received 1 dose at 2.4 mg/kg for a total of 127 mg on 6/30/25.            Carole Holly MD       ___________________________________________________________________    Subjective & Interval History:    No acute events noted overnight. Pt feeling better this morning.  She is feeling more like herself.  Sitting up in a chair, moving around the room.      She met with palliative care yesterday and was comfortable with plan to continue NG feeds.        + small BM this am,.    Physical Exam:    Blood pressure 104/66, pulse (!) 135, temperature (!) 101.4  F (38.6  C), resp. rate 18, height 1.645 m (5' 4.76\"), weight 50.4 kg (111 lb 1.8 oz), SpO2 94%.        Labs & Studies: I personally reviewed the following studies:  ROUTINE LABS (Last four results):  CMP  Recent Labs   Lab 07/18/25  0633 07/17/25  1828 07/17/25  0657 07/16/25  1605 07/15/25  1222 07/15/25  0608 07/14/25  1356 07/14/25  1058   * 136 133* 132*   < > 132*   < > 129*   POTASSIUM 4.1 3.6 3.9 3.8  3.8   < > 3.6   < > 3.8   CHLORIDE 93* 98 96* 94*   < > 94*   < > 89*   CO2 24 24 25 26   < > 19*   < > 19*   ANIONGAP 15 14 12 12   < > 19*   < > 21*   * 125* 135* 109*   < > 87   < > 84   BUN 4.5* 5.6* 4.1* 3.6*   < > 3.4*   < > 6.4   CR 0.35* 0.34* 0.37* 0.37*   < > 0.39*   < > 0.45*   GFRESTIMATED >90 >90 >90 >90   < > >90   < > >90   EBONIE 8.0* 7.7* 8.1* 8.2*   < > 8.3*   < > 9.3   MAG 1.7 1.8 1.8 1.9   < > 1.9  --  1.9   PHOS 2.0* 2.8 2.0* 2.3*   < > 2.8  --  3.2   PROTTOTAL  --   --   --   --   --  6.7  --  7.4   ALBUMIN  --   --   --   --   --  3.2*  --  3.7   BILITOTAL  --   --   --   --   --  0.4  --  0.5   ALKPHOS  --   --   --   --   --  163*  --  174*   AST  --   --   --   --   --  67*  --  57*   ALT  --   --   --   --   --  13  " --  12    < > = values in this interval not displayed.     CBC  Recent Labs   Lab 07/15/25  0608 07/14/25  1058   WBC 7.6 8.0   RBC 3.53* 4.01   HGB 8.7* 9.7*   HCT 27.4* 30.5*   MCV 78 76*   MCH 24.6* 24.2*   MCHC 31.8 31.8   RDW 18.5* 18.6*    506*     INR  Recent Labs   Lab 07/14/25  1058   INR 1.36*     Medications list for reference:  Current Facility-Administered Medications   Medication Dose Route Frequency Provider Last Rate Last Admin    acetaminophen (TYLENOL) tablet 975 mg  975 mg Oral Q8H PRN Katherin Jones MD   975 mg at 07/18/25 0844    benzocaine 20% (HURRICAINE/TOPEX) 20 % spray 0.5-1 mL  1-2 spray Mouth/Throat Q3H PRN Lamberto Boudreaux APRN CNS        benzonatate (TESSALON) capsule 100 mg  100 mg Oral TID PRN Rosette Mckeon PA-C        calcium carbonate (TUMS) chewable tablet 1,000 mg  1,000 mg Oral or Feeding Tube 4x Daily PRN Sha Linder MD        dextrose 10% infusion   Intravenous Continuous PRN Sha Linder MD        heparin lock flush 10 unit/mL injection 5-10 mL  5-10 mL Intracatheter Q1H PRN Lonnie Dubose MD   5 mL at 07/17/25 2213    heparin lock flush 10 unit/mL injection 5-10 mL  5-10 mL Intracatheter Q24H Lonnie Dubose MD   5 mL at 07/15/25 2020    heparin lock flush 100 unit/mL injection 5-10 mL  5-10 mL Intracatheter Q28 Days Lonnie Duboes MD        HYDROmorphone (DILAUDID) tablet 2 mg  2 mg Oral or Feeding Tube Q4H PRN Sha Linder MD   2 mg at 07/17/25 2212    Or    HYDROmorphone (DILAUDID) tablet 4 mg  4 mg Oral or Feeding Tube Q4H PRN Sha Linder MD        ibuprofen (ADVIL/MOTRIN) tablet 600 mg  600 mg Oral Q8H PRN Katherin Jones MD   600 mg at 07/17/25 1644    lidocaine (LMX4) cream   Topical Q1H PRN Rosette Mckeon PA-C        lidocaine 1 % 0.1-1 mL  0.1-1 mL Other Q1H PRN Rosette Mckeon PA-C        LORazepam (ATIVAN) tablet 0.5 mg  0.5 mg Oral or Feeding Tube Q6H PRN Sha Linder MD        multivitamins w/minerals  liquid 15 mL  15 mL Oral or Feeding Tube Daily Sha Linder MD   15 mL at 07/17/25 0756    naloxone (NARCAN) injection 0.2 mg  0.2 mg Intravenous Q2 Min PRN Clovis Monge MD        Or    naloxone (NARCAN) injection 0.4 mg  0.4 mg Intravenous Q2 Min PRN Clovis Monge MD        Or    naloxone (NARCAN) injection 0.2 mg  0.2 mg Intramuscular Q2 Min PRN Clovis Monge MD        Or    naloxone (NARCAN) injection 0.4 mg  0.4 mg Intramuscular Q2 Min PRN Clovis Monge MD        OLANZapine (zyPREXA) tablet 5 mg  5 mg Oral or Feeding Tube At Bedtime Sha Linder MD   5 mg at 07/17/25 2212    omeprazole (PriLOSEC) CR capsule 20 mg  20 mg Oral QAM AC Sha Linder MD   20 mg at 07/17/25 0756    ondansetron (ZOFRAN ODT) ODT tab 8 mg  8 mg Oral Q8H PRN Sha Linder MD        Or    ondansetron (ZOFRAN) solution 8 mg  8 mg Oral Q8H PRN Sha Linder MD        polyethylene glycol (MIRALAX) Packet 17 g  17 g Oral or Feeding Tube Daily PRN Sha Linder MD   17 g at 07/17/25 0758    potassium & sodium phosphates (NEUTRA-PHOS) Packet 1 packet  1 packet Oral or Feeding Tube Q4H Katherin Jones MD   1 packet at 07/18/25 0846    prochlorperazine (COMPAZINE) tablet 10 mg  10 mg Oral or Feeding Tube Q6H PRN Sha Linder MD        senna-docusate (SENOKOT-S/PERICOLACE) 8.6-50 MG per tablet 1 tablet  1 tablet Oral or Feeding Tube BID PRN Sha Linder MD   1 tablet at 07/17/25 0758    Or    senna-docusate (SENOKOT-S/PERICOLACE) 8.6-50 MG per tablet 2 tablet  2 tablet Oral or Feeding Tube BID PRN Sha Linder MD        sodium chloride (PF) 0.9% PF flush 10-20 mL  10-20 mL Intracatheter q1 min prn Lonnie Dubose MD   10 mL at 07/15/25 2240    sodium chloride (PF) 0.9% PF flush 10-20 mL  10-20 mL Intracatheter Q1H PRN Lonnie Dubose MD   20 mL at 07/16/25 0517    sodium chloride (PF) 0.9% PF flush 10-20 mL  10-20 mL Intracatheter Q28 Days Lonnie Dubose MD         sodium chloride (PF) 0.9% PF flush 3 mL  3 mL Intracatheter Q8H Rosette Francis PA-C   3 mL at 07/16/25 1704    thiamine (B-1) tablet 100 mg  100 mg Oral or Feeding Tube Daily Sha Linder MD   100 mg at 07/18/25 0871

## 2025-07-18 NOTE — PROGRESS NOTES
Care Management Discharge Note    Discharge Date: 07/18/2025       Discharge Disposition: Home    Discharge Services: Outpatient palliative care follow up w/ eventual transition to hospice (follows w/ [facility])    Discharge DME: No new DME anticipated    Discharge Transportation: family or friend will provide ()    Private pay costs discussed: Not applicable    Does the patient's insurance plan have a 3 day qualifying hospital stay waiver?  Yes     Which insurance plan 3 day waiver is available? Alternative insurance waiver    Will the waiver be used for post-acute placement? No    PAS Confirmation Code: N/A  Patient/family educated on Medicare website which has current facility and service quality ratings: no (N/A)    Education Provided on the Discharge Plan: Yes  Persons Notified of Discharge Plans: pt/spouse, provider, RD  Patient/Family in Agreement with the Plan: yes    Handoff Referral Completed: No - pt reported that she does not presently have a PCP    Additional Information:  Per [facility] documentation, pt has TF coverage, but must be given through tube.  Reached out to [facility] liaison to clarify this, she will check on if NG tube counts as tube for insurance coverage, or if it must be a PEG.    Received update from [facility] liaison that pt's insurance will cover TFs through NG tube and documentation does not need to indicate OSVALDO >90 days.  RNCC to update liaison to [facility] once known.    10:00am - Case discussed in rounds this AM.  Pt is reportedly no longer interested in pursuing tube feeds.  Provider to discuss further w/ pt/spouse.  Potential discharge today if pulling NG tube/discontinuing feeds.    10:45am - Received update from [facility] liaison that she just spoke w/ pt/spouse about [facility] services.  Liaison reported that pt's  stated they typically utilize [facility] or [facility] for healthcare, so he intends to contact pt's insurance company (presumably to inquire about other in-network  providers), then update the care team on who they ultimately would prefer to utilize for tube feeds.  Liaison requested to be updated when more is known.    Called [facility] ([phone]), spoke w/ Lissette in intake, she clarified that [facility] does NOT offer tube feeds, however [employer] does ([employer]).    [facility] generally takes orders through an online ordering platform called [employer].  Unfortunately, tube feeds do not seem to appear as an option.    Called Melinda Villatoro, [employer]'s , at [phone].  Left  requesting call back to learn more about the process of getting TFs established through [employer].    11:50am - Received call from Shanika mejia/ [employer], she reported she was forwarded this writer's contact info from Melinda to address inquiry.  She reported she does not know how to order TFs through [employer].  She provided fax number to send orders/documentation to: [phone].  RNCC can call customer service intake at [phone] to follow up on fax, however should allow at least 30 min to receive it after sending.    Met w/ pt/spouse at bedside, inquired about preferred TF provider.  Pt's spouse explained there was some miscommunication, as he was not necessarily intending to go w/ a different provider than [facility], but rather was told by [facility] that his insurance claims they have not met deductible/OOP max, which he feels is abnormal at this point in the year, so wanted to check w/ insurance company.  Regardless, pt/spouse reported to this writer that they have determined something alternative:  Pt intends to have NG tube pulled and will discharge to home w/ family, does not want tube feeds to continue.  Pt intends to follow up w/ her outpatient palliative care team w/ [facility] and transition to hospice on an outpatient basis.  They do not want care mgmt to arrange hospice prior to discharge.  No further care mgmt needs reported by pt/spouse at  this time.    Updated provider and RD to change in plans.    Requested [facility] liaison to cancel referral.    No further care management intervention anticipated at this time.  Please re-consult if further needs arise.  Care management signing off.          Leo Fleming RN  8MS Nurse Care Coordinator  [facility] Acute Care Management  Phone: [phone]  Available on Vocera: 8 Med Surg Vocera

## 2025-07-18 NOTE — DISCHARGE SUMMARY
[facility]  Hospitalist Discharge Summary      Date of Admission:  7/14/2025  Date of Discharge:  7/18/2025  1:35 PM  Discharging Provider: Melinda York MD  Discharge Service: Hospitalist Service, GOLD TEAM 22    Discharge Diagnoses   Failure to Thrive  Severe protein-calorie malnutrition  Anion Gap Metabolic Acidosis   Starvation Ketoacidosis   At Risk for Refeeding Syndrome   Hypovolemic Hyponatremia  Stage IV Metastatic Rectal Adenocarcinoma   Hyponatremia    Clinically Significant Risk Factors     # Severe Malnutrition: based on nutrition assessment and treatment provided per dietitian's recommendations.      Follow-ups Needed After Discharge   Follow-up Appointments       ADULT [facility] [place] Specialty Follow-up and recommended labs and tests      Follow up: Follow up with your palliative care doctor and oncologists with your research study.              Unresulted Labs Ordered in the Past 30 Days of this Admission       No orders found from 6/14/2025 to 7/15/2025.            Discharge Disposition   Discharged to home  Condition at discharge: Stable    Hospital Course   Roula Darling is a 50 year old female with a past medical history of stage IV metastatic rectal adenocarcinoma who presents to the ED from her oncology clinic appointment with lactic acidosis and concerns for malnutrition.  She was admitted for management of malnutrition in the setting of her cancer.      Failure to Thrive  Severe protein-calorie malnutrition  Anion Gap Metabolic Acidosis   Starvation Ketoacidosis   At Risk for Refeeding Syndrome   Hypovolemic Hyponatremia  Patient presenting from outpatient clinic appointment d/t anorexia.  Treatment deferred  due deconditioning.  Lactic acid WNLs, however with ketosis and anion gap metabolic acidosis. S/p 2Ls NS in clinic, receiving 1L NS per ED.  Suspect starvation ketoacidosis in setting of metastatic cancer. She trialed NG feeds while admitted,  however ultimately decided to discontinue NG feeds prior to discharge and move more in the direction of hospice. Palliative care and Hem/Onc was consulted during this admission to assist with these conversations.   - cont thiamine, folate, and multivitamins  - high protein, high kcal diet  - Follow up with [facility] (appointment on 7/24) to discuss further steps regarding hospice.      Stage IV Metastatic Rectal Adenocarcinoma - Follows with Dr. Hernandez at [facility], follows with Dr. Deleon with [facility] for TOR clinical trial. Please refer to outpatient note 7/8/25 for full treatment history.   -She will follow up with her research team as needed if she decides to continue vs go hospice cares      Hyponatremia  suspect multifactorial hypovolemic plus iatrogenic with feeds plus free water; stable at around 132 prior to discharge.     Consultations This Hospital Stay   NUTRITION SERVICES ADULT IP CONSULT  ONCOLOGY ADULT IP CONSULT  PHARMACY IP CONSULT  IP CONSULT [facility]  NUTRITION SERVICES ADULT IP CONSULT  PHARMACY IP CONSULT  PHARMACY IP CONSULT  PALLIATIVE CARE ADULT IP CONSULT  PALLIATIVE CARE ADULT IP CONSULT  PALLIATIVE CARE ADULT IP CONSULT  CARE MANAGEMENT / SOCIAL WORK IP CONSULT  CARE MANAGEMENT / SOCIAL WORK IP CONSULT    Code Status   Prior    Time Spent on this Encounter   I, Melinda York MD, personally saw the patient today and spent greater than 30 minutes discharging this patient.       Melinda York MD  [facility] [address]  [facility] [address]  Phone: [phone]  Fax: [phone]  ______________________________________________________________________    Physical Exam   Vital Signs: Temp: 98.9  F (37.2  C) Temp src: Oral BP: 104/66 Pulse: (!) 135   Resp: 18 SpO2: 94 % O2 Device: None (Room air)    Weight: 111 lbs 1.79 oz    Gen: cachectic, chronically ill-appearing. Pale. Alert. NGT in place.   CV: tachycardic no murmurs  Pulm: no increased  WOB  Abd: soft, nl BS  Extremities: no edema  Neuro: moving all extremities spontaneously        Primary Care Physician   Physician No Ref-Primary    Discharge Orders      Activity    Your activity upon discharge: activity as tolerated     ADULT [facility] Specialty Follow-up and recommended labs and tests    Follow up: Follow up with your palliative care doctor and oncologists with your research study.     Tubes and Drains    Current Tubes and Drains:     CVC Line  Duration           Port a Cath 02/17/23 Single Lumen Right Chest wall 881 days        Drain  Duration     Reason for your hospital stay    You were admitted for malnutrition and consideration for tube feeding, which was started. We stabilized your electrolytes and then you opted to not continue tube feeds. The feeding tube was removed.     Diet    Follow this diet upon discharge:       High Kcal/High Protein Diet, ADULT       Significant Results and Procedures   Most Recent 3 CBC's:  Recent Labs   Lab Test 07/15/25  0608 07/14/25  1058 07/07/25  1042   WBC 7.6 8.0 6.9   HGB 8.7* 9.7* 9.5*   MCV 78 76* 76*    506* 384     Most Recent 3 BMP's:  Recent Labs   Lab Test 07/18/25  0633 07/17/25  1828 07/17/25  0657   * 136 133*   POTASSIUM 4.1 3.6 3.9   CHLORIDE 93* 98 96*   CO2 24 24 25   BUN 4.5* 5.6* 4.1*   CR 0.35* 0.34* 0.37*   ANIONGAP 15 14 12   EBONIE 8.0* 7.7* 8.1*   * 125* 135*   ,   Results for orders placed or performed during the hospital encounter of 07/14/25   XR Abdomen Port 1 View    Narrative    Exam: XR ABDOMEN PORT 1 VIEW, 7/15/2025 2:07 PM    Indication: TECH: Please locate the END of the tube to verify gastric  feeding tube placement    Comparison: X-ray 7/14/2025.    Findings:   Frontal radiograph of the abdomen. Feeding tube tip projects below the  diaphragm, follows the greater curvature of the stomach terminating in  the distal stomach/near the antrum, not postpyloric. Gas-filled colon,  no abnormally distended loops of  small or large bowel. No portal  venous gas, no pneumatosis. Minimal stool burden. Prominent  costochondral changes of the ribs.         Impression    Impression:   1. Feeding tube tip is at the gastric antrum, not postpyloric.  2. Nonobstructive bowel gas pattern.     I have personally reviewed the examination and initial interpretation  and I agree with the findings.    AMIE DRAPER MD         SYSTEM ID:  [id number]   CT Chest/Abdomen/Pelvis w Contrast    Narrative    EXAMINATION: CT CHEST/ABDOMEN/PELVIS W CONTRAST, 7/15/2025 10:00 PM    INDICATION: Assess cancer progression, early satiety, vomiting    COMPARISON STUDY: 6/20/2025    TECHNIQUE: CT scan of the abdomen and pelvis was performed on  multidetector CT scanner using volumetric acquisition technique and  images were reconstructed in multiple planes with variable thickness  and reviewed on dedicated workstations.     CONTRAST: 53mL Isovue 370 injected IV without oral contrast    CT scan radiation dose is optimized to minimum requisite dose using  automated dose modulation techniques.    FINDINGS:  Right chest wall Port-A-Cath with tip at the cavoatrial junction.    Lungs: No significant change in multiple pulmonary masses, for example  left lower lobe measuring 4.3 x 5.4 cm, previously 4.5 x 5.2 cm..  No  focal consolidation. No pneumothorax. No pleural effusion.    Mediastinum:  Heart size is within normal limits. Ascending thoracic  aorta is within normal limits. Normal caliber main pulmonary artery.  Mild coronary artery calcifications. No significant pericardial  effusion. Calcified mediastinal lymph nodes. Enlarged hilar lymph  nodes are similar to prior.    Liver: Significant increase in liver metastatic lesions example series  2 image 98 measuring 2.2 x 2.1 cm, previously 0.6 x 1.0 cm. Additional  lesion of the caudate lobe measuring 2.3 x 2.3 cm, previously 0.9 x  0.7 cm. The largest liver lesion measures 9.6 x 8.8 cm, previously 7.4  x 7.0 cm. No  intrahepatic biliary ductal dilation.    Biliary System: Cholelithiasis with pericholecystic fluid. No  extrahepatic biliary ductal dilation.    Pancreas: No mass or pancreatic ductal dilation.    Adrenal glands: Overall stable right adrenal metastases together  measuring 2.4 x 2.6 cm. No left mass or nodules    Spleen: Normal.    Kidneys: No suspicious mass, obstructing calculus or hydronephrosis.    Gastrointestinal tract: Enteric tube tip is within the  pylorus/proximal duodenum. Normal appendix. Normal caliber small  bowel.  Stable left rectal wall thickening with similar extension into  the mesorectal fascia.    Mesentery/peritoneum/retroperitoneum: No mass. No free fluid or air.   Small fat-containing umbilical hernia.    Lymph nodes: Continued increased size of retroperitoneal lymph nodes  for example series 2 image 157 measuring 9 mm on the short axis,  previously 4 mm.     Vasculature: Patent major abdominal vasculature.    Pelvis: Urinary bladder is normal. Uterus and adnexa within normal  limits.    Osseous structures: No aggressive or acute osseous lesion.      Soft tissues: Within normal limits.      Impression    IMPRESSION:   1. Interval increase of disease burden within the liver as well as the  periportal and retroperitoneal lymph nodes.  2. Stable size of right adrenal gland metastases and lung metastases.  3. Redemonstration of mural wall thickening of the left rectum with  similar extension into the mesorectal fascia.    I have personally reviewed the examination and initial interpretation  and I agree with the findings.    VINAY BASURTO DO         SYSTEM ID:  [id number]   Echo Complete     Value    LVEF  60-65%    Narrative    [id number]  [id number]  [id number]  [id number]^RIMA^HOLLIE^     [facility]  Echocardiography Laboratory  [address] [place] [id number]     Name: KAYE MORENO  MRN: [id number]  : 1975  Study Date: 2025 12:31 PM  Age:  50 yrs  Gender: Female  Patient Location: [facility]  Reason For Study: Abn EKG  Ordering Physician: HOLLIE ABARCA  Performed By: Philippe Simms RDCS     BSA: 1.5 m2  Height: 64 in  Weight: 111 lb  HR: 115  ______________________________________________________________________________  Procedure  Echocardiogram with two-dimensional, color and spectral Doppler.  ______________________________________________________________________________  Interpretation Summary  Left ventricular size, wall motion and function are normal. The ejection  fraction is 60-65%.  Right ventricular function, chamber size, wall motion, and thickness are  normal.  Trace to mild mitral insufficiency is present.  The inferior vena cava was normal in size with preserved respiratory  variability. No pericardial effusion is present.     No significant changes noted.  ______________________________________________________________________________  Left Ventricle  Left ventricular size, wall motion and function are normal. The ejection  fraction is 60-65%. Left ventricular diastolic function is normal. No regional  wall motion abnormalities are seen.     Right Ventricle  Right ventricular function, chamber size, wall motion, and thickness are  normal.     Atria  Both atria appear normal.     Mitral Valve  Trace to mild mitral insufficiency is present.     Aortic Valve  Aortic valve is normal in structure and function. The aortic valve is  tricuspid. No aortic regurgitation is present.     Tricuspid Valve  The tricuspid valve is normal. Trace tricuspid insufficiency is present. The  peak velocity of the tricuspid regurgitant jet is not obtainable.     Pulmonic Valve  The pulmonic valve is normal.     Vessels  The aorta root is normal. The inferior vena cava was normal in size with  preserved respiratory variability.     Pericardium  No pericardial effusion is present.     Compared to Previous Study  No significant changes  noted.  ______________________________________________________________________________  MMode/2D Measurements & Calculations  IVSd: 0.97 cm  LVIDd: 4.4 cm  LVIDs: 2.8 cm  LVPWd: 0.81 cm  FS: 37.0 %  LV mass(C)d: 124.9 grams  LV mass(C)dI: 82.0 grams/m2  Ao root diam: 3.7 cm  asc Aorta Diam: 3.3 cm  LVOT diam: 2.4 cm  LVOT area: 4.5 cm2  Ao root diam index Ht(cm/m): 2.3  Ao root diam index BSA (cm/m2): 2.4  Asc Ao diam index BSA (cm/m2): 2.2  Asc Ao diam index Ht(cm/m): 2.0  LA Volume (BP): 34.3 ml     LA Volume Index (BP): 22.6 ml/m2  RWT: 0.37     ______________________________________________________________________________  Report approved by: LINDSAY REARDON MD on 07/17/2025 02:28 PM             Discharge Medications      Review of your medicines        START taking        Dose / Directions   thiamine 100 MG tablet  Commonly known as: B-1  Used for: Starvation ketoacidosis      Dose: 100 mg  Take 1 tablet (100 mg) by mouth or Feeding Tube daily.  Quantity: 60 tablet  Refills: 0            CHANGE how you take these medications        Dose / Directions   benzonatate 100 MG capsule  Commonly known as: TESSALON  This may have changed:   when to take this  reasons to take this  Used for: Starvation ketoacidosis      Dose: 100 mg  Take 1 capsule (100 mg) by mouth 3 times daily as needed for cough.  Quantity: 60 capsule  Refills: 1            CONTINUE these medicines which have NOT CHANGED        Dose / Directions   HYDROmorphone 2 MG tablet  Commonly known as: DILAUDID      Dose: 2-4 mg  Take 2-4 mg by mouth 3 times daily as needed for pain.  Refills: 0     ibuprofen 200 MG tablet  Commonly known as: ADVIL/MOTRIN      Dose: 200 mg  Take 200 mg by mouth every 4 hours as needed for pain.  Refills: 0     lidocaine-prilocaine 2.5-2.5 % external cream  Commonly known as: EMLA      APPLY TO PORT-A-CATH SITE ONE HOUR PRIOR TO NEEDLE ACCESS.  Refills: 0     LORazepam 0.5 MG tablet  Commonly known as: ATIVAN  Used for:  Examination of participant or control in clinical research, Rectal cancer (H)      Dose: 1 mg  Take 2 tablets (1 mg) by mouth every 6 hours as needed for nausea or vomiting.  Refills: 0     OLANZapine 2.5 MG tablet  Commonly known as: ZyPREXA  Used for: Rectal cancer (H)      Dose: 5 mg  Take 2 tablets (5 mg) by mouth at bedtime.  Quantity: 30 tablet  Refills: 0     omeprazole 20 MG DR capsule  Commonly known as: PriLOSEC      Dose: 20 mg  Take 20 mg by mouth daily.  Refills: 0     ondansetron 8 MG ODT tab  Commonly known as: ZOFRAN ODT  Used for: Rectal cancer (H), Nausea and vomiting, unspecified vomiting type      Dose: 8 mg  Take 1 tablet (8 mg) by mouth every 8 hours as needed for nausea.  Quantity: 60 tablet  Refills: 3     polyethylene glycol 17 g packet  Commonly known as: MIRALAX      Dose: 17 g  Take 17 g by mouth 2 times daily as needed for constipation.  Refills: 0     prochlorperazine 10 MG tablet  Commonly known as: COMPAZINE      Dose: 1 tablet  Take 1 tablet by mouth every 6 hours as needed for nausea or vomiting.  Refills: 0     senna 8.6 MG tablet  Commonly known as: SENOKOT      Dose: 2 tablet  Take 2 tablets by mouth 2 times daily as needed for constipation.  Refills: 0               Where to get your medicines        These medications were sent to [place], [place] - [address]  [address] [address]      Phone: [phone]   benzonatate 100 MG capsule  thiamine 100 MG tablet       Allergies   Allergies   Allergen Reactions    Oxaliplatin Anaphylaxis

## 2025-07-18 NOTE — PROGRESS NOTES
PALLIATIVE CARE PROGRESS NOTE  [facility]     Patient Name: Roula Darling  Date of Admission: 7/14/2025   Today the patient was seen for: goals of care     Recommendations & Counseling       GOALS OF CARE:   DNR/DNI, does not want to return to the hospital, wants comfort focused cares, no artificial nutrition, and OK for oral antibiotics. I have completed a POLST to reflect these wishes of hers.   She plans to follow up with her Palliative care provider Dr. Rdz next Thursday, encouraged her to call them for a sooner appointment possibly, would hate for her to have to be hospitalized again unnecessarily.     ADVANCE CARE PLANNING:  No health care directive on file. Per system policy, Surrogate Decision-makers for Patients With Diminished Decision-making Capacity offers guidance on possible decision-makers. Spouse has been identified as a surrogate decision maker.   There is a POLST form on file, but will need to be updated prior to discharge.  Code status: No CPR- Do NOT Intubate    MEDICAL MANAGEMENT:   Discomfort from the NG has resolved as it was removed during this visit. Does does not want another one in the future at all.    PSYCHOSOCIAL/SPIRITUAL:  Family Spouse, two children    Palliative Care will continue to follow. Thank you for the consult and allowing us to aid in the care of Roula Darling.    These recommendations have been discussed with primary team.    SOILA Torres CNS  [facility], Palliative Care  Securely message with the [email or web address] Web Console (learn more here) or  Text page via [facility] Paging/Directory        Assessment          Roula Darling is a 50 year old female with a past medical history of stage IV metastatic rectal cancer who presented on 7/14 with malnutrition, ketosis, FTT.       Today, the patient was seen for:  Metastatic rectal cancer  Severe malnutrition  Ketosis  Nausea  Vomiting      Interval History:      Multidisciplinary collaboration:  Notes reviewed, no acute events overnight, spoke with Dr. Holly on updates that plan is discharge with hospice and no NG at this time.    Patient/family narrative  Met with patient and spouse today. Discussed that after speaking to all the doctors the last 24 hours, Roula is in agreement that she would not go home with the NG and plan to leave the hospital and focus on comfort focused treatments. She mentions she has an appointment next Thursday to see her PC provider Dr Rdz. We discussed her wishes moving forward and completed the POLST as above. We discussed also getting a list for hospice agency's that go to her home from . Discussed with her some of the treatment of symptoms with hospice and discussed what hospice typically looks like at home. Discussed also her previous thoughts about if taking a bunch of pills would be a good way to die, and we discussed the risks with that plan and at times people not accomplishing their goal. We discussed alternative to symptom management. She seems to agree it would likely not be a good idea to take a bunch of medications to expedite the process and I encouraged her to continue to talk with her health care providers and hospice eventually about how she is feeling and what she does want her death to look like.    Review of Systems:     Besides above, ROS was reviewed and is unremarkable        Physical Exam:   Temp:  [97.5  F (36.4  C)-101.6  F (38.7  C)] 98.9  F (37.2  C)  Pulse:  [107-151] 135  Resp:  [18] 18  BP: ()/(66-74) 104/66  SpO2:  [94 %-99 %] 94 %  111 lbs 1.79 oz    Physical Exam            Data Reviewed:     Recent Results (from the past 24 hours)   Echo Complete   Result Value Ref Range    LVEF  60-65%     Narrative    [id number]  [id number]  [id number]  [id number]^RIMA^HOLLIE^     [facility],[place]  Echocardiography Laboratory  [address] [place] [id number]     Name: TIFFANIE  KAYE MUNOZ  MRN: [id number]  : 1975  Study Date: 2025 12:31 PM  Age: 50 yrs  Gender: Female  Patient Location: [facility]  Reason For Study: Abn EKG  Ordering Physician: HOLLIE ABARCA  Performed By: Philippe Simms QUINTIN     BSA: 1.5 m2  Height: 64 in  Weight: 111 lb  HR: 115  ______________________________________________________________________________  Procedure  Echocardiogram with two-dimensional, color and spectral Doppler.  ______________________________________________________________________________  Interpretation Summary  Left ventricular size, wall motion and function are normal. The ejection  fraction is 60-65%.  Right ventricular function, chamber size, wall motion, and thickness are  normal.  Trace to mild mitral insufficiency is present.  The inferior vena cava was normal in size with preserved respiratory  variability. No pericardial effusion is present.     No significant changes noted.  ______________________________________________________________________________  Left Ventricle  Left ventricular size, wall motion and function are normal. The ejection  fraction is 60-65%. Left ventricular diastolic function is normal. No regional  wall motion abnormalities are seen.     Right Ventricle  Right ventricular function, chamber size, wall motion, and thickness are  normal.     Atria  Both atria appear normal.     Mitral Valve  Trace to mild mitral insufficiency is present.     Aortic Valve  Aortic valve is normal in structure and function. The aortic valve is  tricuspid. No aortic regurgitation is present.     Tricuspid Valve  The tricuspid valve is normal. Trace tricuspid insufficiency is present. The  peak velocity of the tricuspid regurgitant jet is not obtainable.     Pulmonic Valve  The pulmonic valve is normal.     Vessels  The aorta root is normal. The inferior vena cava was normal in size with  preserved respiratory variability.     Pericardium  No pericardial effusion is present.      Compared to Previous Study  No significant changes noted.  ______________________________________________________________________________  MMode/2D Measurements & Calculations  IVSd: 0.97 cm  LVIDd: 4.4 cm  LVIDs: 2.8 cm  LVPWd: 0.81 cm  FS: 37.0 %  LV mass(C)d: 124.9 grams  LV mass(C)dI: 82.0 grams/m2  Ao root diam: 3.7 cm  asc Aorta Diam: 3.3 cm  LVOT diam: 2.4 cm  LVOT area: 4.5 cm2  Ao root diam index Ht(cm/m): 2.3  Ao root diam index BSA (cm/m2): 2.4  Asc Ao diam index BSA (cm/m2): 2.2  Asc Ao diam index Ht(cm/m): 2.0  LA Volume (BP): 34.3 ml     LA Volume Index (BP): 22.6 ml/m2  RWT: 0.37     ______________________________________________________________________________  Report approved by: LINDSAY REARDON MD on 07/17/2025 02:28 PM         Magnesium   Result Value Ref Range    Magnesium 1.8 1.7 - 2.3 mg/dL   Phosphorus   Result Value Ref Range    Phosphorus 2.8 2.5 - 4.5 mg/dL   Basic metabolic panel   Result Value Ref Range    Sodium 136 135 - 145 mmol/L    Potassium 3.6 3.4 - 5.3 mmol/L    Chloride 98 98 - 107 mmol/L    Carbon Dioxide (CO2) 24 22 - 29 mmol/L    Anion Gap 14 7 - 15 mmol/L    Urea Nitrogen 5.6 (L) 6.0 - 20.0 mg/dL    Creatinine 0.34 (L) 0.51 - 0.95 mg/dL    GFR Estimate >90 >60 mL/min/1.73m2    Calcium 7.7 (L) 8.8 - 10.4 mg/dL    Glucose 125 (H) 70 - 99 mg/dL   Magnesium   Result Value Ref Range    Magnesium 1.7 1.7 - 2.3 mg/dL   Phosphorus   Result Value Ref Range    Phosphorus 2.0 (L) 2.5 - 4.5 mg/dL   Basic metabolic panel   Result Value Ref Range    Sodium 132 (L) 135 - 145 mmol/L    Potassium 4.1 3.4 - 5.3 mmol/L    Chloride 93 (L) 98 - 107 mmol/L    Carbon Dioxide (CO2) 24 22 - 29 mmol/L    Anion Gap 15 7 - 15 mmol/L    Urea Nitrogen 4.5 (L) 6.0 - 20.0 mg/dL    Creatinine 0.35 (L) 0.51 - 0.95 mg/dL    GFR Estimate >90 >60 mL/min/1.73m2    Calcium 8.0 (L) 8.8 - 10.4 mg/dL    Glucose 113 (H) 70 - 99 mg/dL     Recent Labs   Lab 07/18/25  0633 07/17/25  1828 07/17/25  0657 07/15/25  1222  07/15/25  0608 25  1356 25  1058   WBC  --   --   --   --  7.6  --  8.0   HGB  --   --   --   --  8.7*  --  9.7*   MCV  --   --   --   --  78  --  76*   PLT  --   --   --   --  443  --  506*   INR  --   --   --   --   --   --  1.36*   * 136 133*   < > 132*   < > 129*   POTASSIUM 4.1 3.6 3.9   < > 3.6   < > 3.8   CHLORIDE 93* 98 96*   < > 94*   < > 89*   CO2 24 24 25   < > 19*   < > 19*   BUN 4.5* 5.6* 4.1*   < > 3.4*   < > 6.4   CR 0.35* 0.34* 0.37*   < > 0.39*   < > 0.45*   ANIONGAP 15 14 12   < > 19*   < > 21*   EBONIE 8.0* 7.7* 8.1*   < > 8.3*   < > 9.3   * 125* 135*   < > 87   < > 84   ALBUMIN  --   --   --   --  3.2*  --  3.7   PROTTOTAL  --   --   --   --  6.7  --  7.4   BILITOTAL  --   --   --   --  0.4  --  0.5   ALKPHOS  --   --   --   --  163*  --  174*   ALT  --   --   --   --  13  --  12   AST  --   --   --   --  67*  --  57*   LIPASE  --   --   --   --   --   --  23    < > = values in this interval not displayed.       Recent Results (from the past 24 hours)   Echo Complete   Result Value    LVEF  60-65%    formerly [facility]    [id number]  [id number]  [id number]  [id number]^RIMA^HOLLIE^     [facility]  Echocardiography Laboratory  [address] [place] [id number]     Name: KAYE MORENO  MRN: [id number]  : 1975  Study Date: 2025 12:31 PM  Age: 50 yrs  Gender: Female  Patient Location: [facility]  Reason For Study: Abn EKG  Ordering Physician: HOLLIE ABARCA  Performed By: Philippe Simms QUINTIN     BSA: 1.5 m2  Height: 64 in  Weight: 111 lb  HR: 115  ______________________________________________________________________________  Procedure  Echocardiogram with two-dimensional, color and spectral Doppler.  ______________________________________________________________________________  Interpretation Summary  Left ventricular size, wall motion and function are normal. The ejection  fraction is 60-65%.  Right ventricular function, chamber  size, wall motion, and thickness are  normal.  Trace to mild mitral insufficiency is present.  The inferior vena cava was normal in size with preserved respiratory  variability. No pericardial effusion is present.     No significant changes noted.  ______________________________________________________________________________  Left Ventricle  Left ventricular size, wall motion and function are normal. The ejection  fraction is 60-65%. Left ventricular diastolic function is normal. No regional  wall motion abnormalities are seen.     Right Ventricle  Right ventricular function, chamber size, wall motion, and thickness are  normal.     Atria  Both atria appear normal.     Mitral Valve  Trace to mild mitral insufficiency is present.     Aortic Valve  Aortic valve is normal in structure and function. The aortic valve is  tricuspid. No aortic regurgitation is present.     Tricuspid Valve  The tricuspid valve is normal. Trace tricuspid insufficiency is present. The  peak velocity of the tricuspid regurgitant jet is not obtainable.     Pulmonic Valve  The pulmonic valve is normal.     Vessels  The aorta root is normal. The inferior vena cava was normal in size with  preserved respiratory variability.     Pericardium  No pericardial effusion is present.     Compared to Previous Study  No significant changes noted.  ______________________________________________________________________________  MMode/2D Measurements & Calculations  IVSd: 0.97 cm  LVIDd: 4.4 cm  LVIDs: 2.8 cm  LVPWd: 0.81 cm  FS: 37.0 %  LV mass(C)d: 124.9 grams  LV mass(C)dI: 82.0 grams/m2  Ao root diam: 3.7 cm  asc Aorta Diam: 3.3 cm  LVOT diam: 2.4 cm  LVOT area: 4.5 cm2  Ao root diam index Ht(cm/m): 2.3  Ao root diam index BSA (cm/m2): 2.4  Asc Ao diam index BSA (cm/m2): 2.2  Asc Ao diam index Ht(cm/m): 2.0  LA Volume (BP): 34.3 ml     LA Volume Index (BP): 22.6 ml/m2  RWT: 0.37      ______________________________________________________________________________  Report approved by: LINDSAY REARDON MD on 07/17/2025 02:28 PM               Medical Decision Making       60 MINUTES SPENT BY ME on the date of service doing chart review, history, exam, documentation & further activities per the note.

## 2025-07-19 ENCOUNTER — PATIENT OUTREACH (OUTPATIENT)
Dept: CARE COORDINATION | Facility: CLINIC | Age: 50
End: 2025-07-19
Payer: COMMERCIAL

## 2025-07-19 NOTE — PROGRESS NOTES
[facility] Care Resource Center: [facility]    Background: Transitional Care Management program identified per system criteria and reviewed by [facility] Resource Center team for possible outreach.    Assessment: Upon chart review, [facility] Team member will not proceed with patient outreach related to this episode of Transitional Care Management program due to reason below:    Patient has been discharged with Hospice Care.    Plan: Transitional Care Management episode addressed appropriately per reason noted above.      Yeimy Bliss MA  [facility]    *Connected Care Resource Team does NOT follow patient ongoing. Referrals are identified based on internal discharge reports and the outreach is to ensure patient has an understanding of their discharge instructions.

## 2025-07-22 ENCOUNTER — TELEPHONE (OUTPATIENT)
Dept: ONCOLOGY | Facility: CLINIC | Age: 50
End: 2025-07-22
Payer: COMMERCIAL

## 2025-07-22 NOTE — TELEPHONE ENCOUNTER
CLINICAL TRIAL NOTE      Date of Call: 7/22/2025     Study considerations: Torl     Research Discussion:   Called to follow up with patient post-discharge. Roula has dicussed with the provider her CT results while in hospital and understand that her cancer has grown on imaging. Discussed options for coming off trial vs hospice with provider and family. Confirmed with Roula today that she would like to come off trial and is pursuing hospice, with home appointments. She also has declined to come in for the EOT visit related to the trial and declined and further study related procedures.     Roula Darling was given the opportunity to ask any trial related questions. The patient knows to call with any questions or concerns.      Navneet Abarca, Clinical Research Coordinator.  [facility], [facility]   [facility]  Clinical Trials Office

## 2025-07-23 ENCOUNTER — DOCUMENTATION ONLY (OUTPATIENT)
Dept: OTHER | Facility: CLINIC | Age: 50
End: 2025-07-23
Payer: COMMERCIAL